# Patient Record
Sex: FEMALE | Race: OTHER | HISPANIC OR LATINO | URBAN - METROPOLITAN AREA
[De-identification: names, ages, dates, MRNs, and addresses within clinical notes are randomized per-mention and may not be internally consistent; named-entity substitution may affect disease eponyms.]

---

## 2017-11-02 ENCOUNTER — OUTPATIENT (OUTPATIENT)
Dept: OUTPATIENT SERVICES | Facility: HOSPITAL | Age: 42
LOS: 1 days | Discharge: HOME | End: 2017-11-02
Payer: COMMERCIAL

## 2017-11-02 DIAGNOSIS — C50.919 MALIGNANT NEOPLASM OF UNSPECIFIED SITE OF UNSPECIFIED FEMALE BREAST: ICD-10-CM

## 2017-11-02 DIAGNOSIS — C50.911 MALIGNANT NEOPLASM OF UNSPECIFIED SITE OF RIGHT FEMALE BREAST: ICD-10-CM

## 2017-11-03 ENCOUNTER — APPOINTMENT (OUTPATIENT)
Dept: HEMATOLOGY ONCOLOGY | Facility: CLINIC | Age: 42
End: 2017-11-03
Payer: COMMERCIAL

## 2017-11-03 VITALS
DIASTOLIC BLOOD PRESSURE: 83 MMHG | HEART RATE: 100 BPM | TEMPERATURE: 97.9 F | SYSTOLIC BLOOD PRESSURE: 135 MMHG | RESPIRATION RATE: 16 BRPM | OXYGEN SATURATION: 99 % | HEIGHT: 61.81 IN | WEIGHT: 172.18 LBS | BODY MASS INDEX: 31.68 KG/M2

## 2017-11-03 DIAGNOSIS — Z80.1 FAMILY HISTORY OF MALIGNANT NEOPLASM OF TRACHEA, BRONCHUS AND LUNG: ICD-10-CM

## 2017-11-03 DIAGNOSIS — Z78.9 OTHER SPECIFIED HEALTH STATUS: ICD-10-CM

## 2017-11-03 PROCEDURE — 99245 OFF/OP CONSLTJ NEW/EST HI 55: CPT

## 2017-11-08 ENCOUNTER — RESULT REVIEW (OUTPATIENT)
Age: 42
End: 2017-11-08

## 2017-11-08 PROCEDURE — 88321 CONSLTJ&REPRT SLD PREP ELSWR: CPT

## 2017-11-09 LAB — SURGICAL PATHOLOGY STUDY: SIGNIFICANT CHANGE UP

## 2017-11-13 ENCOUNTER — RESULT REVIEW (OUTPATIENT)
Age: 42
End: 2017-11-13

## 2017-11-13 PROCEDURE — 88321 CONSLTJ&REPRT SLD PREP ELSWR: CPT

## 2017-11-15 LAB — SURGICAL PATHOLOGY STUDY: SIGNIFICANT CHANGE UP

## 2018-01-10 ENCOUNTER — TRANSCRIPTION ENCOUNTER (OUTPATIENT)
Age: 43
End: 2018-01-10

## 2018-01-29 PROBLEM — Z78.9 SOCIAL ALCOHOL USE: Status: ACTIVE | Noted: 2018-01-29

## 2018-01-29 PROBLEM — Z80.1 FAMILY HISTORY OF LUNG CANCER: Status: ACTIVE | Noted: 2018-01-29

## 2018-03-01 ENCOUNTER — OTHER (OUTPATIENT)
Age: 43
End: 2018-03-01

## 2018-03-06 ENCOUNTER — OUTPATIENT (OUTPATIENT)
Dept: OUTPATIENT SERVICES | Facility: HOSPITAL | Age: 43
LOS: 1 days | Discharge: HOME | End: 2018-03-06

## 2018-03-06 DIAGNOSIS — C50.919 MALIGNANT NEOPLASM OF UNSPECIFIED SITE OF UNSPECIFIED FEMALE BREAST: ICD-10-CM

## 2018-03-09 ENCOUNTER — APPOINTMENT (OUTPATIENT)
Dept: HEMATOLOGY ONCOLOGY | Facility: CLINIC | Age: 43
End: 2018-03-09
Payer: COMMERCIAL

## 2018-03-09 VITALS
TEMPERATURE: 98 F | HEART RATE: 81 BPM | BODY MASS INDEX: 31.24 KG/M2 | WEIGHT: 169.75 LBS | RESPIRATION RATE: 16 BRPM | OXYGEN SATURATION: 100 % | DIASTOLIC BLOOD PRESSURE: 83 MMHG | SYSTOLIC BLOOD PRESSURE: 135 MMHG

## 2018-03-09 PROCEDURE — 99214 OFFICE O/P EST MOD 30 MIN: CPT

## 2018-03-12 ENCOUNTER — APPOINTMENT (OUTPATIENT)
Dept: BREAST CENTER | Facility: CLINIC | Age: 43
End: 2018-03-12
Payer: COMMERCIAL

## 2018-03-12 VITALS
WEIGHT: 167 LBS | BODY MASS INDEX: 30.73 KG/M2 | HEIGHT: 61.81 IN | SYSTOLIC BLOOD PRESSURE: 121 MMHG | HEART RATE: 82 BPM | TEMPERATURE: 98.2 F | OXYGEN SATURATION: 100 % | DIASTOLIC BLOOD PRESSURE: 79 MMHG

## 2018-03-12 PROCEDURE — 99243 OFF/OP CNSLTJ NEW/EST LOW 30: CPT

## 2018-06-18 ENCOUNTER — RESULT REVIEW (OUTPATIENT)
Age: 43
End: 2018-06-18

## 2018-06-28 ENCOUNTER — APPOINTMENT (OUTPATIENT)
Dept: BREAST CENTER | Facility: CLINIC | Age: 43
End: 2018-06-28
Payer: COMMERCIAL

## 2018-06-28 PROCEDURE — 99214 OFFICE O/P EST MOD 30 MIN: CPT

## 2018-07-13 ENCOUNTER — OUTPATIENT (OUTPATIENT)
Dept: OUTPATIENT SERVICES | Facility: HOSPITAL | Age: 43
LOS: 1 days | Discharge: HOME | End: 2018-07-13

## 2018-07-13 DIAGNOSIS — C50.919 MALIGNANT NEOPLASM OF UNSPECIFIED SITE OF UNSPECIFIED FEMALE BREAST: ICD-10-CM

## 2018-07-17 ENCOUNTER — LABORATORY RESULT (OUTPATIENT)
Age: 43
End: 2018-07-17

## 2018-07-17 ENCOUNTER — APPOINTMENT (OUTPATIENT)
Dept: HEMATOLOGY ONCOLOGY | Facility: CLINIC | Age: 43
End: 2018-07-17
Payer: COMMERCIAL

## 2018-07-17 ENCOUNTER — RESULT REVIEW (OUTPATIENT)
Age: 43
End: 2018-07-17

## 2018-07-17 VITALS
OXYGEN SATURATION: 100 % | DIASTOLIC BLOOD PRESSURE: 75 MMHG | WEIGHT: 159.99 LBS | TEMPERATURE: 98.9 F | BODY MASS INDEX: 29.44 KG/M2 | HEART RATE: 90 BPM | SYSTOLIC BLOOD PRESSURE: 116 MMHG | RESPIRATION RATE: 16 BRPM

## 2018-07-17 LAB
BASOPHILS # BLD AUTO: 0.1 K/UL — SIGNIFICANT CHANGE UP (ref 0–0.2)
BASOPHILS NFR BLD AUTO: 1.6 % — SIGNIFICANT CHANGE UP (ref 0–2)
EOSINOPHIL # BLD AUTO: 0 K/UL — SIGNIFICANT CHANGE UP (ref 0–0.5)
EOSINOPHIL NFR BLD AUTO: 0.9 % — SIGNIFICANT CHANGE UP (ref 0–6)
HCT VFR BLD CALC: 32.7 % — LOW (ref 34.5–45)
HGB BLD-MCNC: 11.2 G/DL — LOW (ref 11.5–15.5)
LYMPHOCYTES # BLD AUTO: 0.7 K/UL — LOW (ref 1–3.3)
LYMPHOCYTES # BLD AUTO: 18.6 % — SIGNIFICANT CHANGE UP (ref 13–44)
MCHC RBC-ENTMCNC: 33.5 PG — SIGNIFICANT CHANGE UP (ref 27–34)
MCHC RBC-ENTMCNC: 34.4 G/DL — SIGNIFICANT CHANGE UP (ref 32–36)
MCV RBC AUTO: 97.6 FL — SIGNIFICANT CHANGE UP (ref 80–100)
MONOCYTES # BLD AUTO: 0.5 K/UL — SIGNIFICANT CHANGE UP (ref 0–0.9)
MONOCYTES NFR BLD AUTO: 12.3 % — SIGNIFICANT CHANGE UP (ref 2–14)
NEUTROPHILS # BLD AUTO: 2.5 K/UL — SIGNIFICANT CHANGE UP (ref 1.8–7.4)
NEUTROPHILS NFR BLD AUTO: 66.6 % — SIGNIFICANT CHANGE UP (ref 43–77)
PLATELET # BLD AUTO: 176 K/UL — SIGNIFICANT CHANGE UP (ref 150–400)
RBC # BLD: 3.35 M/UL — LOW (ref 3.8–5.2)
RBC # FLD: 10.8 % — SIGNIFICANT CHANGE UP (ref 10.3–14.5)
WBC # BLD: 3.8 K/UL — SIGNIFICANT CHANGE UP (ref 3.8–10.5)
WBC # FLD AUTO: 3.8 K/UL — SIGNIFICANT CHANGE UP (ref 3.8–10.5)

## 2018-07-17 PROCEDURE — 99215 OFFICE O/P EST HI 40 MIN: CPT

## 2018-07-17 RX ORDER — MECLIZINE HYDROCHLORIDE 12.5 MG/1
12.5 TABLET ORAL
Qty: 30 | Refills: 0 | Status: DISCONTINUED | COMMUNITY
Start: 2018-06-26

## 2018-07-17 RX ORDER — SILVER SULFADIAZINE 10 MG/G
1 CREAM TOPICAL
Qty: 400 | Refills: 0 | Status: DISCONTINUED | COMMUNITY
Start: 2018-01-29

## 2018-07-17 RX ORDER — GABAPENTIN 300 MG/1
300 CAPSULE ORAL
Qty: 30 | Refills: 0 | Status: DISCONTINUED | COMMUNITY
Start: 2018-02-13

## 2018-07-17 RX ORDER — CYCLOBENZAPRINE HYDROCHLORIDE 5 MG/1
5 TABLET, FILM COATED ORAL
Qty: 90 | Refills: 0 | Status: DISCONTINUED | COMMUNITY
Start: 2018-03-08

## 2018-07-18 LAB
ANION GAP SERPL CALC-SCNC: 11 MMOL/L
APPEARANCE: ABNORMAL
BILIRUBIN URINE: NEGATIVE
BLOOD URINE: NEGATIVE
BUN SERPL-MCNC: 13 MG/DL
CALCIUM SERPL-MCNC: 8.5 MG/DL
CHLORIDE SERPL-SCNC: 104 MMOL/L
CO2 SERPL-SCNC: 26 MMOL/L
COLOR: YELLOW
CREAT SERPL-MCNC: 0.65 MG/DL
GLUCOSE QUALITATIVE U: NEGATIVE MG/DL
GLUCOSE SERPL-MCNC: 98 MG/DL
KETONES URINE: NEGATIVE
LEUKOCYTE ESTERASE URINE: NEGATIVE
NITRITE URINE: NEGATIVE
PH URINE: 7.5
POTASSIUM SERPL-SCNC: 4.1 MMOL/L
PROTEIN URINE: NEGATIVE MG/DL
SODIUM SERPL-SCNC: 141 MMOL/L
SPECIFIC GRAVITY URINE: 1.02
UROBILINOGEN URINE: NEGATIVE MG/DL

## 2018-07-19 ENCOUNTER — APPOINTMENT (OUTPATIENT)
Dept: BREAST CENTER | Facility: CLINIC | Age: 43
End: 2018-07-19

## 2018-10-04 ENCOUNTER — TRANSCRIPTION ENCOUNTER (OUTPATIENT)
Age: 43
End: 2018-10-04

## 2018-11-05 ENCOUNTER — APPOINTMENT (OUTPATIENT)
Dept: BREAST CENTER | Facility: CLINIC | Age: 43
End: 2018-11-05
Payer: COMMERCIAL

## 2018-11-05 ENCOUNTER — OTHER (OUTPATIENT)
Age: 43
End: 2018-11-05

## 2018-11-05 VITALS
BODY MASS INDEX: 29.44 KG/M2 | HEIGHT: 62 IN | SYSTOLIC BLOOD PRESSURE: 135 MMHG | WEIGHT: 160 LBS | HEART RATE: 90 BPM | DIASTOLIC BLOOD PRESSURE: 84 MMHG

## 2018-11-05 VITALS
HEART RATE: 90 BPM | SYSTOLIC BLOOD PRESSURE: 135 MMHG | WEIGHT: 160 LBS | BODY MASS INDEX: 29.44 KG/M2 | HEIGHT: 62 IN | DIASTOLIC BLOOD PRESSURE: 84 MMHG

## 2018-11-05 PROCEDURE — 99214 OFFICE O/P EST MOD 30 MIN: CPT

## 2018-11-05 RX ORDER — POLYETHYLENE GLYCOL 3350 17 G/17G
17 POWDER, FOR SOLUTION ORAL
Qty: 30 | Refills: 0 | Status: COMPLETED | COMMUNITY
Start: 2018-08-10

## 2018-11-05 RX ORDER — SODIUM SULFATE, POTASSIUM SULFATE, MAGNESIUM SULFATE 17.5; 3.13; 1.6 G/ML; G/ML; G/ML
17.5-3.13-1.6 SOLUTION, CONCENTRATE ORAL
Qty: 354 | Refills: 0 | Status: COMPLETED | COMMUNITY
Start: 2018-08-10

## 2018-11-05 RX ORDER — SULFAMETHOXAZOLE AND TRIMETHOPRIM 800; 160 MG/1; MG/1
800-160 TABLET ORAL TWICE DAILY
Qty: 10 | Refills: 0 | Status: COMPLETED | COMMUNITY
Start: 2018-07-17 | End: 2018-11-05

## 2018-11-15 ENCOUNTER — OUTPATIENT (OUTPATIENT)
Dept: OUTPATIENT SERVICES | Facility: HOSPITAL | Age: 43
LOS: 1 days | Discharge: HOME | End: 2018-11-15

## 2018-11-15 DIAGNOSIS — C50.919 MALIGNANT NEOPLASM OF UNSPECIFIED SITE OF UNSPECIFIED FEMALE BREAST: ICD-10-CM

## 2018-11-22 ENCOUNTER — MOBILE ON CALL (OUTPATIENT)
Age: 43
End: 2018-11-22

## 2018-11-22 ENCOUNTER — TRANSCRIPTION ENCOUNTER (OUTPATIENT)
Age: 43
End: 2018-11-22

## 2018-11-27 ENCOUNTER — APPOINTMENT (OUTPATIENT)
Dept: HEMATOLOGY ONCOLOGY | Facility: CLINIC | Age: 43
End: 2018-11-27
Payer: COMMERCIAL

## 2018-11-27 VITALS
TEMPERATURE: 98.4 F | SYSTOLIC BLOOD PRESSURE: 124 MMHG | WEIGHT: 159.84 LBS | HEART RATE: 84 BPM | DIASTOLIC BLOOD PRESSURE: 80 MMHG | RESPIRATION RATE: 17 BRPM | BODY MASS INDEX: 29.23 KG/M2 | OXYGEN SATURATION: 100 %

## 2018-11-27 PROCEDURE — 99215 OFFICE O/P EST HI 40 MIN: CPT

## 2018-12-03 ENCOUNTER — OTHER (OUTPATIENT)
Age: 43
End: 2018-12-03

## 2018-12-04 ENCOUNTER — FORM ENCOUNTER (OUTPATIENT)
Age: 43
End: 2018-12-04

## 2018-12-05 ENCOUNTER — APPOINTMENT (OUTPATIENT)
Dept: UROLOGY | Facility: CLINIC | Age: 43
End: 2018-12-05
Payer: COMMERCIAL

## 2018-12-05 ENCOUNTER — APPOINTMENT (OUTPATIENT)
Dept: CT IMAGING | Facility: IMAGING CENTER | Age: 43
End: 2018-12-05
Payer: COMMERCIAL

## 2018-12-05 ENCOUNTER — OUTPATIENT (OUTPATIENT)
Dept: OUTPATIENT SERVICES | Facility: HOSPITAL | Age: 43
LOS: 1 days | End: 2018-12-05
Payer: COMMERCIAL

## 2018-12-05 VITALS
HEIGHT: 62 IN | DIASTOLIC BLOOD PRESSURE: 83 MMHG | RESPIRATION RATE: 17 BRPM | TEMPERATURE: 98.7 F | SYSTOLIC BLOOD PRESSURE: 124 MMHG | BODY MASS INDEX: 29.26 KG/M2 | HEART RATE: 105 BPM | WEIGHT: 159 LBS

## 2018-12-05 DIAGNOSIS — N28.89 OTHER SPECIFIED DISORDERS OF KIDNEY AND URETER: ICD-10-CM

## 2018-12-05 DIAGNOSIS — D05.12 INTRADUCTAL CARCINOMA IN SITU OF LEFT BREAST: ICD-10-CM

## 2018-12-05 PROCEDURE — 99203 OFFICE O/P NEW LOW 30 MIN: CPT

## 2018-12-05 PROCEDURE — 74178 CT ABD&PLV WO CNTR FLWD CNTR: CPT

## 2018-12-05 PROCEDURE — 74178 CT ABD&PLV WO CNTR FLWD CNTR: CPT | Mod: 26

## 2018-12-06 ENCOUNTER — APPOINTMENT (OUTPATIENT)
Dept: UROLOGY | Facility: CLINIC | Age: 43
End: 2018-12-06

## 2018-12-10 ENCOUNTER — APPOINTMENT (OUTPATIENT)
Dept: UROLOGY | Facility: CLINIC | Age: 43
End: 2018-12-10

## 2019-01-29 NOTE — HISTORY OF PRESENT ILLNESS
[Disease: _____________________] : Disease: [unfilled] [T: ___] : T[unfilled] [N: ___] : N[unfilled] [M: ___] : M[unfilled] [AJCC Stage: ____] : AJCC Stage: [unfilled] [de-identified] : Ms. Flowers is a [place] female who on June 2, 2017 saw her gynecologist, Dr. Corrigan, as she had menses twice in the same month and subsequently felt tired.  She was then referred for routine screening mammogram, which was performed on June 5, 2017 with the finding of a right breast mass with associated architectural distortion suspicious for malignancy with ultrasound-guided core biopsy recommended.  The patient also consequently had a bilateral breast ultrasound on the same date with a finding of a hypoechoic mass with irregular margins and posterior shadowing at the 1 o'clock axis of the right breast corresponding to the findings on the mammogram with no further suspicious findings; ultrasound-guided core biopsy was recommended.  She ultimately had an ultrasound-guided core biopsy performed on June 12, 2017 with a finding of invasive moderately differentiated duct carcinoma, with the largest focus of invasive carcinoma measuring up to 5 mm with evidence of DCIS, intermediate nuclear grade, solid and cribriform types, with no evidence of lymphovascular invasion, ER positive (77%), MO positive (83%), and HER-2/carmen (1+) and negative.  The patient then went on to have a bilateral breast MRI with the right breast showing, within the upper inner quadrant, a spiculated enhancing mass corresponding to the biopsy-proven cancer measuring up to 2 cm in the mediolateral direction, and 1.7 cm in the anterior-posterior direction, and 1.5 cm in the craniocaudal direction; left breast showed, within the upper outer quadrant, a somewhat serpiginous area of enhancement with the more anterior aspect becoming more nodular and measuring up to 1.1 cm in size, with additional scattered nonspecific enhancing foci; the axillae were unremarkable.  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  The patient subsequently was seen with complaint of pain and burning sensation and a new "lump" at the left breast biopsy site with a comment from the radiologist of a small hematoma with surrounding ecchymosis present on July 22, 2017, with instructions for warm compresses and pressure.  The patient ultimately saw Dr. Caroline Benítez and underwent a bilateral lumpectomy and reduction mammoplasty, which was performed on August 11, 2017, at [facility] with a finding in the right breast of an invasive ductal carcinoma, moderately differentiated, measuring 2 cm in greatest diameter, nuclear, [place] score 7/9, with evidence of DCIS and LCIS present in addition to fibrocystic changes with margins negative for carcinoma, with 0/3 sentinel lymph nodes involved with carcinoma; the left breast excisional biopsy showed evidence of ductal carcinoma in situ with microinvasion, with DCIS being of high nuclear grade, solid and comedo type with central necrosis, evidence of lobular carcinoma in situ, with margins of resection negative for DCIS and microinvasion.  The patient returned to the operating room on October 13, 2017 for a left sentinel lymph node biopsy with a finding of 0/3 left sentinel lymph nodes involved with carcinoma.  She subsequently had Oncotype DX testing sent off on her right breast carcinoma with a finding of a recurrence score of 7, correlating to an 8% risk of distant recurrence within 10 years should she take tamoxifen alone.  The patient saw a medical oncologist, Dr. Maldonado, at [facility] who recommended adjuvant chemotherapy with CMF.  She also had BRCA testing sent off, specifically a BRCA 1/2 [employer] 3-site mutation panel, which returned negative of note.\par \par She saw me in initial consultation in 11/17 and I recommended that she proceed with further genetic predisposition testing which returned negative for other known genetic predisposition mutations. Started Tamoxifen on October 24th 2017. She completed RT at [place] () on 2/13/18.   [de-identified] : right breast IDC; left breast DCIS [de-identified] : Pt c/o gradually worsening dysmenorrhea since beginning slater; menses also heavier (always have been heavy). \par \par LMP approx 11/2 . \par Since the period was over she persisted with cramping pelvic pain and then developed left flank pain 3 days ago; went to [facility] in [facility]. "urine test was negative " per pt; she stopped the abx she was given. In the interim the pain has decreased but not fully gone.\par \par She otherwise notes agood appetite stable weight and excellent performance status \par \par \par \par \par Last visit she c/o "brain fog" - more forgetful ie names or numbers, but this has improved in the interim. \par \par She continue to have regular menses but in the last 2 mo she had heavy and prolonged menstrual bleeding lasting up to 10-12 days. She spoke with gynecologist and sent for a transvaginal US which returned WNL.   She has an appt to see her gyn next week. She also c/o fever / chills and sore throat over the last 3 days (fever resolved- last was yesterday). She also c/o pelvic "cramping"; on further questioning she noted some mild and intermittent dysuria over the past several weeks and she denies all other complaints.\par \par 6/18 B/L breast mammo and US : neg  \par \par

## 2019-01-29 NOTE — REVIEW OF SYSTEMS
[Negative] : Endocrine [FreeTextEntry2] : as above [FreeTextEntry4] : as above [FreeTextEntry8] : as above [de-identified] : as above

## 2019-01-29 NOTE — PHYSICAL EXAM
[Fully active, able to carry on all pre-disease performance without restriction] : Status 0 - Fully active, able to carry on all pre-disease performance without restriction [Normal] : affect appropriate [de-identified] : s/p B/L LE  and redn mammoplasty with well healed scars, no masses; B/L ax neg [de-identified] : question of mild right sided CVAT???

## 2019-02-14 ENCOUNTER — APPOINTMENT (OUTPATIENT)
Dept: BREAST CENTER | Facility: CLINIC | Age: 44
End: 2019-02-14
Payer: COMMERCIAL

## 2019-02-14 VITALS
HEIGHT: 62 IN | BODY MASS INDEX: 29.44 KG/M2 | HEART RATE: 91 BPM | SYSTOLIC BLOOD PRESSURE: 112 MMHG | DIASTOLIC BLOOD PRESSURE: 71 MMHG | TEMPERATURE: 98.6 F | WEIGHT: 160 LBS

## 2019-02-14 PROCEDURE — 99214 OFFICE O/P EST MOD 30 MIN: CPT

## 2019-02-22 ENCOUNTER — OUTPATIENT (OUTPATIENT)
Dept: OUTPATIENT SERVICES | Facility: HOSPITAL | Age: 44
LOS: 1 days | Discharge: HOME | End: 2019-02-22

## 2019-02-22 DIAGNOSIS — C50.919 MALIGNANT NEOPLASM OF UNSPECIFIED SITE OF UNSPECIFIED FEMALE BREAST: ICD-10-CM

## 2019-03-05 ENCOUNTER — RESULT REVIEW (OUTPATIENT)
Age: 44
End: 2019-03-05

## 2019-03-05 ENCOUNTER — APPOINTMENT (OUTPATIENT)
Dept: HEMATOLOGY ONCOLOGY | Facility: CLINIC | Age: 44
End: 2019-03-05
Payer: COMMERCIAL

## 2019-03-05 VITALS
WEIGHT: 159.99 LBS | RESPIRATION RATE: 16 BRPM | OXYGEN SATURATION: 100 % | DIASTOLIC BLOOD PRESSURE: 80 MMHG | HEART RATE: 93 BPM | TEMPERATURE: 98.5 F | BODY MASS INDEX: 29.26 KG/M2 | SYSTOLIC BLOOD PRESSURE: 117 MMHG

## 2019-03-05 LAB
BASOPHILS # BLD AUTO: 0 K/UL — SIGNIFICANT CHANGE UP (ref 0–0.2)
BASOPHILS NFR BLD AUTO: 0.3 % — SIGNIFICANT CHANGE UP (ref 0–2)
EOSINOPHIL # BLD AUTO: 0.1 K/UL — SIGNIFICANT CHANGE UP (ref 0–0.5)
EOSINOPHIL NFR BLD AUTO: 1.1 % — SIGNIFICANT CHANGE UP (ref 0–6)
HCT VFR BLD CALC: 33.6 % — LOW (ref 34.5–45)
HGB BLD-MCNC: 11.7 G/DL — SIGNIFICANT CHANGE UP (ref 11.5–15.5)
LYMPHOCYTES # BLD AUTO: 1.2 K/UL — SIGNIFICANT CHANGE UP (ref 1–3.3)
LYMPHOCYTES # BLD AUTO: 16.6 % — SIGNIFICANT CHANGE UP (ref 13–44)
MCHC RBC-ENTMCNC: 33.2 PG — SIGNIFICANT CHANGE UP (ref 27–34)
MCHC RBC-ENTMCNC: 34.7 G/DL — SIGNIFICANT CHANGE UP (ref 32–36)
MCV RBC AUTO: 95.8 FL — SIGNIFICANT CHANGE UP (ref 80–100)
MONOCYTES # BLD AUTO: 0.4 K/UL — SIGNIFICANT CHANGE UP (ref 0–0.9)
MONOCYTES NFR BLD AUTO: 5.2 % — SIGNIFICANT CHANGE UP (ref 2–14)
NEUTROPHILS # BLD AUTO: 5.5 K/UL — SIGNIFICANT CHANGE UP (ref 1.8–7.4)
NEUTROPHILS NFR BLD AUTO: 76.8 % — SIGNIFICANT CHANGE UP (ref 43–77)
PLATELET # BLD AUTO: 223 K/UL — SIGNIFICANT CHANGE UP (ref 150–400)
RBC # BLD: 3.51 M/UL — LOW (ref 3.8–5.2)
RBC # FLD: 11.3 % — SIGNIFICANT CHANGE UP (ref 10.3–14.5)
WBC # BLD: 7.2 K/UL — SIGNIFICANT CHANGE UP (ref 3.8–10.5)
WBC # FLD AUTO: 7.2 K/UL — SIGNIFICANT CHANGE UP (ref 3.8–10.5)

## 2019-03-05 PROCEDURE — 99214 OFFICE O/P EST MOD 30 MIN: CPT

## 2019-03-07 RX ORDER — CEPHALEXIN 500 MG/1
500 CAPSULE ORAL
Qty: 21 | Refills: 0 | Status: DISCONTINUED | COMMUNITY
Start: 2018-11-22

## 2019-03-07 NOTE — PHYSICAL EXAM
[Fully active, able to carry on all pre-disease performance without restriction] : Status 0 - Fully active, able to carry on all pre-disease performance without restriction [Normal] : affect appropriate [de-identified] : s/p B/L LE  and redn mammoplasty with well healed scars, no masses; B/L ax neg [de-identified] : question of mild right sided CVAT???

## 2019-03-07 NOTE — HISTORY OF PRESENT ILLNESS
[Disease: _____________________] : Disease: [unfilled] [T: ___] : T[unfilled] [N: ___] : N[unfilled] [M: ___] : M[unfilled] [AJCC Stage: ____] : AJCC Stage: [unfilled] [de-identified] : Ms. Flowers is a [place] female who on June 2, 2017 saw her gynecologist, Dr. Corrigan, as she had menses twice in the same month and subsequently felt tired.  She was then referred for routine screening mammogram, which was performed on June 5, 2017 with the finding of a right breast mass with associated architectural distortion suspicious for malignancy with ultrasound-guided core biopsy recommended.  The patient also consequently had a bilateral breast ultrasound on the same date with a finding of a hypoechoic mass with irregular margins and posterior shadowing at the 1 o'clock axis of the right breast corresponding to the findings on the mammogram with no further suspicious findings; ultrasound-guided core biopsy was recommended.  She ultimately had an ultrasound-guided core biopsy performed on June 12, 2017 with a finding of invasive moderately differentiated duct carcinoma, with the largest focus of invasive carcinoma measuring up to 5 mm with evidence of DCIS, intermediate nuclear grade, solid and cribriform types, with no evidence of lymphovascular invasion, ER positive (77%), GA positive (83%), and HER-2/carmen (1+) and negative.  The patient then went on to have a bilateral breast MRI with the right breast showing, within the upper inner quadrant, a spiculated enhancing mass corresponding to the biopsy-proven cancer measuring up to 2 cm in the mediolateral direction, and 1.7 cm in the anterior-posterior direction, and 1.5 cm in the craniocaudal direction; left breast showed, within the upper outer quadrant, a somewhat serpiginous area of enhancement with the more anterior aspect becoming more nodular and measuring up to 1.1 cm in size, with additional scattered nonspecific enhancing foci; the axillae were unremarkable.  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  The patient subsequently was seen with complaint of pain and burning sensation and a new "lump" at the left breast biopsy site with a comment from the radiologist of a small hematoma with surrounding ecchymosis present on July 22, 2017, with instructions for warm compresses and pressure.  The patient ultimately saw Dr. Caroline Benítez and underwent a bilateral lumpectomy and reduction mammoplasty, which was performed on August 11, 2017, at [facility] with a finding in the right breast of an invasive ductal carcinoma, moderately differentiated, measuring 2 cm in greatest diameter, nuclear, [place] score 7/9, with evidence of DCIS and LCIS present in addition to fibrocystic changes with margins negative for carcinoma, with 0/3 sentinel lymph nodes involved with carcinoma; the left breast excisional biopsy showed evidence of ductal carcinoma in situ with microinvasion, with DCIS being of high nuclear grade, solid and comedo type with central necrosis, evidence of lobular carcinoma in situ, with margins of resection negative for DCIS and microinvasion.  The patient returned to the operating room on October 13, 2017 for a left sentinel lymph node biopsy with a finding of 0/3 left sentinel lymph nodes involved with carcinoma.  She subsequently had Oncotype DX testing sent off on her right breast carcinoma with a finding of a recurrence score of 7, correlating to an 8% risk of distant recurrence within 10 years should she take tamoxifen alone.  The patient saw a medical oncologist, Dr. Maldonado, at [facility] who recommended adjuvant chemotherapy with CMF.  She also had BRCA testing sent off, specifically a BRCA 1/2 [employer] 3-site mutation panel, which returned negative of note.\par \par She saw me in initial consultation in 11/17 and I recommended that she proceed with further genetic predisposition testing which returned negative for other known genetic predisposition mutations. Started Tamoxifen on October 24th 2017. She completed RT at [place] () on 2/13/18.   [de-identified] : right breast IDC; left breast DCIS [de-identified] : Here for routine f/u. \par \par Menses remain regular. No further severe dysmenorrhea / cramping beyond her baseline.\par   \par She otherwise notes a good appetite stable weight and excellent performance status \par \par 6/18 B/L mammo neg\par \par

## 2019-03-26 ENCOUNTER — TRANSCRIPTION ENCOUNTER (OUTPATIENT)
Age: 44
End: 2019-03-26

## 2019-05-20 ENCOUNTER — FORM ENCOUNTER (OUTPATIENT)
Age: 44
End: 2019-05-20

## 2019-05-21 ENCOUNTER — APPOINTMENT (OUTPATIENT)
Dept: CT IMAGING | Facility: IMAGING CENTER | Age: 44
End: 2019-05-21
Payer: COMMERCIAL

## 2019-05-21 ENCOUNTER — LABORATORY RESULT (OUTPATIENT)
Age: 44
End: 2019-05-21

## 2019-05-21 ENCOUNTER — APPOINTMENT (OUTPATIENT)
Dept: HEMATOLOGY ONCOLOGY | Facility: CLINIC | Age: 44
End: 2019-05-21
Payer: COMMERCIAL

## 2019-05-21 ENCOUNTER — APPOINTMENT (OUTPATIENT)
Dept: MRI IMAGING | Facility: IMAGING CENTER | Age: 44
End: 2019-05-21
Payer: COMMERCIAL

## 2019-05-21 ENCOUNTER — OUTPATIENT (OUTPATIENT)
Dept: OUTPATIENT SERVICES | Facility: HOSPITAL | Age: 44
LOS: 1 days | End: 2019-05-21
Payer: COMMERCIAL

## 2019-05-21 ENCOUNTER — RESULT REVIEW (OUTPATIENT)
Age: 44
End: 2019-05-21

## 2019-05-21 ENCOUNTER — OUTPATIENT (OUTPATIENT)
Dept: OUTPATIENT SERVICES | Facility: HOSPITAL | Age: 44
LOS: 1 days | Discharge: HOME | End: 2019-05-21

## 2019-05-21 VITALS
OXYGEN SATURATION: 100 % | DIASTOLIC BLOOD PRESSURE: 79 MMHG | WEIGHT: 175.26 LBS | HEART RATE: 81 BPM | RESPIRATION RATE: 16 BRPM | TEMPERATURE: 97.4 F | SYSTOLIC BLOOD PRESSURE: 117 MMHG | BODY MASS INDEX: 32.06 KG/M2

## 2019-05-21 DIAGNOSIS — C50.919 MALIGNANT NEOPLASM OF UNSPECIFIED SITE OF UNSPECIFIED FEMALE BREAST: ICD-10-CM

## 2019-05-21 DIAGNOSIS — R63.5 ABNORMAL WEIGHT GAIN: ICD-10-CM

## 2019-05-21 DIAGNOSIS — C50.911 MALIGNANT NEOPLASM OF UNSPECIFIED SITE OF RIGHT FEMALE BREAST: ICD-10-CM

## 2019-05-21 DIAGNOSIS — Z17.0 ESTROGEN RECEPTOR POSITIVE STATUS [ER+]: ICD-10-CM

## 2019-05-21 LAB
ALBUMIN SERPL ELPH-MCNC: 4.3 G/DL
ALP BLD-CCNC: 55 U/L
ALT SERPL-CCNC: 15 U/L
ANION GAP SERPL CALC-SCNC: 13 MMOL/L
AST SERPL-CCNC: 17 U/L
BASOPHILS # BLD AUTO: 0 K/UL — SIGNIFICANT CHANGE UP (ref 0–0.2)
BASOPHILS NFR BLD AUTO: 0.3 % — SIGNIFICANT CHANGE UP (ref 0–2)
BILIRUB SERPL-MCNC: 0.2 MG/DL
BUN SERPL-MCNC: 12 MG/DL — SIGNIFICANT CHANGE UP (ref 7–23)
BUN SERPL-MCNC: 13 MG/DL
CA-I BLDA-SCNC: 1.16 MMOL/L — SIGNIFICANT CHANGE UP (ref 1.12–1.3)
CALCIUM SERPL-MCNC: 9.1 MG/DL
CHLORIDE SERPL-SCNC: 104 MMOL/L — SIGNIFICANT CHANGE UP (ref 96–108)
CHLORIDE SERPL-SCNC: 105 MMOL/L
CO2 SERPL-SCNC: 25 MMOL/L
CO2 SERPL-SCNC: 27 MMOL/L — SIGNIFICANT CHANGE UP (ref 22–31)
CREAT SERPL-MCNC: 0.69 MG/DL
CREAT SERPL-MCNC: 0.7 MG/DL — SIGNIFICANT CHANGE UP (ref 0.5–1.3)
EOSINOPHIL # BLD AUTO: 0.1 K/UL — SIGNIFICANT CHANGE UP (ref 0–0.5)
EOSINOPHIL NFR BLD AUTO: 1.4 % — SIGNIFICANT CHANGE UP (ref 0–6)
ERYTHROCYTE [SEDIMENTATION RATE] IN BLOOD: 25 MM/HR — HIGH (ref 0–15)
GLUCOSE SERPL-MCNC: 111 MG/DL — HIGH (ref 70–99)
GLUCOSE SERPL-MCNC: 117 MG/DL
HCT VFR BLD CALC: 33.1 % — LOW (ref 34.5–45)
HGB BLD-MCNC: 11.6 G/DL — SIGNIFICANT CHANGE UP (ref 11.5–15.5)
LYMPHOCYTES # BLD AUTO: 1.2 K/UL — SIGNIFICANT CHANGE UP (ref 1–3.3)
LYMPHOCYTES # BLD AUTO: 28.1 % — SIGNIFICANT CHANGE UP (ref 13–44)
MCHC RBC-ENTMCNC: 34.5 PG — HIGH (ref 27–34)
MCHC RBC-ENTMCNC: 35.1 G/DL — SIGNIFICANT CHANGE UP (ref 32–36)
MCV RBC AUTO: 98.1 FL — SIGNIFICANT CHANGE UP (ref 80–100)
MONOCYTES # BLD AUTO: 0.4 K/UL — SIGNIFICANT CHANGE UP (ref 0–0.9)
MONOCYTES NFR BLD AUTO: 8.8 % — SIGNIFICANT CHANGE UP (ref 2–14)
NEUTROPHILS # BLD AUTO: 2.6 K/UL — SIGNIFICANT CHANGE UP (ref 1.8–7.4)
NEUTROPHILS NFR BLD AUTO: 61.4 % — SIGNIFICANT CHANGE UP (ref 43–77)
PLATELET # BLD AUTO: 247 K/UL — SIGNIFICANT CHANGE UP (ref 150–400)
POTASSIUM SERPL-MCNC: 4.3 MMOL/L — SIGNIFICANT CHANGE UP (ref 3.5–5.3)
POTASSIUM SERPL-SCNC: 4.3 MMOL/L — SIGNIFICANT CHANGE UP (ref 3.5–5.3)
POTASSIUM SERPL-SCNC: 4.7 MMOL/L
PROT SERPL-MCNC: 7.2 G/DL
RBC # BLD: 3.37 M/UL — LOW (ref 3.8–5.2)
RBC # FLD: 10.8 % — SIGNIFICANT CHANGE UP (ref 10.3–14.5)
SODIUM SERPL-SCNC: 141 MMOL/L — SIGNIFICANT CHANGE UP (ref 135–145)
SODIUM SERPL-SCNC: 143 MMOL/L
TSH SERPL-ACNC: 4.2 UIU/ML
WBC # BLD: 4.3 K/UL — SIGNIFICANT CHANGE UP (ref 3.8–10.5)
WBC # FLD AUTO: 4.3 K/UL — SIGNIFICANT CHANGE UP (ref 3.8–10.5)

## 2019-05-21 PROCEDURE — 74177 CT ABD & PELVIS W/CONTRAST: CPT | Mod: 26

## 2019-05-21 PROCEDURE — 70553 MRI BRAIN STEM W/O & W/DYE: CPT | Mod: 26

## 2019-05-21 PROCEDURE — 70553 MRI BRAIN STEM W/O & W/DYE: CPT

## 2019-05-21 PROCEDURE — 71260 CT THORAX DX C+: CPT | Mod: 26

## 2019-05-21 PROCEDURE — 74177 CT ABD & PELVIS W/CONTRAST: CPT

## 2019-05-21 PROCEDURE — A9585: CPT

## 2019-05-21 PROCEDURE — 99215 OFFICE O/P EST HI 40 MIN: CPT

## 2019-05-21 PROCEDURE — 71260 CT THORAX DX C+: CPT

## 2019-05-22 PROBLEM — R63.5 WEIGHT GAIN: Status: ACTIVE | Noted: 2019-05-22

## 2019-05-22 NOTE — REVIEW OF SYSTEMS
[Negative] : Endocrine [FreeTextEntry2] : as maycol [FreeTextEntry4] : as above [de-identified] : as above [de-identified] : as above

## 2019-05-22 NOTE — PHYSICAL EXAM
[Fully active, able to carry on all pre-disease performance without restriction] : Status 0 - Fully active, able to carry on all pre-disease performance without restriction [Normal] : affect appropriate [de-identified] : s/p B/L LE  and redn mammoplasty with well healed scars, no masses; B/L ax neg [de-identified] : Intact mini mental status exam; CN II-XII intact; no dysmetria; F-N intact; sensory motor intact; balance intact; muscles strength 5/5 B/L sym

## 2019-05-22 NOTE — HISTORY OF PRESENT ILLNESS
[Disease: _____________________] : Disease: [unfilled] [T: ___] : T[unfilled] [N: ___] : N[unfilled] [M: ___] : M[unfilled] [AJCC Stage: ____] : AJCC Stage: [unfilled] [de-identified] : Ms. Flowers is a Mong[place] female who on June 2, 2017 saw her gynecologist, Dr. Corrigan, as she had menses twice in the same month and subsequently felt tired.  She was then referred for routine screening mammogram, which was performed on June 5, 2017 with the finding of a right breast mass with associated architectural distortion suspicious for malignancy with ultrasound-guided core biopsy recommended.  The patient also consequently had a bilateral breast ultrasound on the same date with a finding of a hypoechoic mass with irregular margins and posterior shadowing at the 1 o'clock axis of the right breast corresponding to the findings on the mammogram with no further suspicious findings; ultrasound-guided core biopsy was recommended.  She ultimately had an ultrasound-guided core biopsy performed on June 12, 2017 with a finding of invasive moderately differentiated duct carcinoma, with the largest focus of invasive carcinoma measuring up to 5 mm with evidence of DCIS, intermediate nuclear grade, solid and cribriform types, with no evidence of lymphovascular invasion, ER positive (77%), GA positive (83%), and HER-2/carmen (1+) and negative.  The patient then went on to have a bilateral breast MRI with the right breast showing, within the upper inner quadrant, a spiculated enhancing mass corresponding to the biopsy-proven cancer measuring up to 2 cm in the mediolateral direction, and 1.7 cm in the anterior-posterior direction, and 1.5 cm in the craniocaudal direction; left breast showed, within the upper outer quadrant, a somewhat serpiginous area of enhancement with the more anterior aspect becoming more nodular and measuring up to 1.1 cm in size, with additional scattered nonspecific enhancing foci; the axillae were unremarkable.  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  The patient subsequently was seen with complaint of pain and burning sensation and a new "lump" at the left breast biopsy site with a comment from the radiologist of a small hematoma with surrounding ecchymosis present on July 22, 2017, with instructions for warm compresses and pressure.  The patient ultimately saw Dr. Caroline Benítez and underwent a bilateral lumpectomy and reduction mammoplasty, which was performed on August 11, 2017, at [facility] with a finding in the right breast of an invasive ductal carcinoma, moderately differentiated, measuring 2 cm in greatest diameter, nuclear, [place] score 7/9, with evidence of DCIS and LCIS present in addition to fibrocystic changes with margins negative for carcinoma, with 0/3 sentinel lymph nodes involved with carcinoma; the left breast excisional biopsy showed evidence of ductal carcinoma in situ with microinvasion, with DCIS being of high nuclear grade, solid and comedo type with central necrosis, evidence of lobular carcinoma in situ, with margins of resection negative for DCIS and microinvasion.  The patient returned to the operating room on October 13, 2017 for a left sentinel lymph node biopsy with a finding of 0/3 left sentinel lymph nodes involved with carcinoma.  She subsequently had Oncotype DX testing sent off on her right breast carcinoma with a finding of a recurrence score of 7, correlating to an 8% risk of distant recurrence within 10 years should she take tamoxifen alone.  The patient saw a medical oncologist, Dr. Maldonado, at [facility] who recommended adjuvant chemotherapy with CMF.  She also had BRCA testing sent off, specifically a BRCA 1/2 [employer] 3-site mutation panel, which returned negative of note.\par \par She saw me in initial consultation in 11/17 and I recommended that she proceed with further genetic predisposition testing which returned negative for other known genetic predisposition mutations. Started Tamoxifen on October 24th 2017. She completed RT at [place] () on 2/13/18.   [de-identified] : right breast IDC; left breast DCIS [de-identified] : The pt is here for an urgent visit.. she called yesterday c/o not feeling well.\par \par Today she c/o:\par 1)  approx 1 month of increasing forgetfulness ie calling her mother several times and not realizing she spoke to her earlier in the day and not remembering the conversation amongst several other events almost every day\par 2) "dizziness", not vertigo, not related to change in position, no loss of balance, no change in vision, no N/V\par 3) weight gain "15 lbs" not clearly related to eating more or exercising less\par 4) hair thinning and mild hoarseness \par 5) night sweats x 3-4 weeks\par \par Denies HA, CP, SOB, N/V/D. Reports also being very stressed by ongoing divorce. \par \par \par 6/18 B/L mammo neg\par \par

## 2019-06-24 ENCOUNTER — APPOINTMENT (OUTPATIENT)
Dept: BREAST CENTER | Facility: CLINIC | Age: 44
End: 2019-06-24
Payer: COMMERCIAL

## 2019-06-24 VITALS
BODY MASS INDEX: 30.36 KG/M2 | HEART RATE: 87 BPM | SYSTOLIC BLOOD PRESSURE: 123 MMHG | HEIGHT: 62 IN | WEIGHT: 165 LBS | DIASTOLIC BLOOD PRESSURE: 77 MMHG | TEMPERATURE: 97.4 F

## 2019-06-24 PROCEDURE — 99214 OFFICE O/P EST MOD 30 MIN: CPT

## 2019-06-24 NOTE — ADDENDUM
[FreeTextEntry1] : 6/13/19 ZP Magno SmmgT: compared to priors, dense, stable post surgical changes, R superior central slightly medial and upper L, possibly dystrophic. BR0. Rec magno Dmmg/Mag/lateral views rec. Management of breast pain should be managed clinically.\par 6/20/19 ZP Magno Dmmg/mag, dense, R coarse calcs ant to lumpectomy site in superior central breast possibly surrounding central lucency and L UOQ likely dystrophic calcs. Short term f/u advised'. BR3

## 2019-06-24 NOTE — HISTORY OF PRESENT ILLNESS
[FreeTextEntry1] : 42 y/o BRCA neg 6/17 female. S/p bilat BCS with R SLN, bilat breast reduction, oncoplastic surgery and biozorb insertion on 8/11/17 for RIGHT UIQ [id number] Infiltrating ductal Cancer, margins 1.2 cm, Gr 2. LN 1/4, ER/AL positive and [id number] neg. Oncotype was 7. LEFT was pT1mic, N0, Margins neg at 5 mm. ER+/AL neg and Her2 neg. s/p L SLNBx due to microinvasion on final path 10/13/17. \par Sees medical  oncologist: Wade Alejo, Pt is on tamoxifen. \par C/O mild diffuse left breast pain off and on X 2 weeks. Wonders if she slept in an unusual position. Also perimenopausal. No other breast complaints. Good ROM. No LE. Doing arm exercises at home. Finished XRT 2/18/18.  Med onc is Dr. Alejo. Discharged from Dr. Gatica and Dr. Nogueira (plastic surgery and radiation oncologist). \par 6/6/18 Z-P Bilat mmg, dense breasts, postop changes from BCS, reduction bilat. No suspicious findings, BR2. 6/6/18 ZP- Magno U/S Left 12:00 N2 4X3X4 mm cyst. Seroma measuring 2.2X0.5X1.0cm 1:00 at lumpectomy scar. Right postsurgical change @ 1:00. BR2.\par \par \par

## 2019-06-24 NOTE — PAST MEDICAL HISTORY
[Menstruating] : The patient is menstruating [Menarche Age ____] : age at menarche was [unfilled] [Total Preg ___] : G[unfilled] [Living ___] : Living: [unfilled] [Age At Live Birth ___] : Age at live birth: [unfilled] [FreeTextEntry6] : none [FreeTextEntry8] : 6 mos [FreeTextEntry7] : 6 mos

## 2019-06-24 NOTE — ASSESSMENT
[FreeTextEntry1] : 44 y/o BRCA neg 6/17 female here for f.u, had recent mmg. \par S/p bilat BCS with R SLN, bilat breast reduction, oncoplastic surgery and biozorb insertion on 8/11/17 for RIGHT UIQ [id number] Infiltrating ductal Cancer, margins 1.2 cm, Gr 2. LN 1/4, ER/LA positive and [id number] neg. Oncotype was 7. LEFT was pT1mic, N0, Margins neg at 5 mm. ER+/LA neg and Her2 neg. s/p L SLNBx due to microinvasion on final path 10/13/17. \par Sees medical  oncologist: Wade Alejo, Started tamoxifen 1024/17. Finished EBRT 2/18/18 Dr. Nogueira rad onc. Discharged plastic surgery Dr. Gatica.\par Pt was recently diagnosed with LYME earlier this month. \par \par 6/13/19 ZP Magno SmmgT/US: compared to priors, dense, stable post surgical changes, R superior central slightly medial and upper L, possibly dystrophic. BR0. Rec magno Dmmg/Mag/lateral views rec. Management of breast pain should be managed clinically. \par 6/13/19 ZP Magno US, compared to priors in PACS, R 1:00 N3 stable surgical scar, 2:00  N3 (93X5mm) cyst; L 1:00 N9 surgical scar w subjacent small fluid postsurgical seromas/cysts (1.9X0.6X0.9) and ([id number]). No susp findings magno. Management of breast pain should be managed clinically.BR2\par 6/6/18 Z-P Bilat mmg, dense breasts, postop changes from BCS, reduction bilat. No suspicious findings, BR2. 6/6/18 ZP- Magno U/S Left 12:00 N2 4X3X4 mm cyst. Seroma measuring 2.2X0.5X1.0cm 1:00 at lumpectomy scar. Right postsurgical change @ 1:00. BR3.\par \par CBE: CORIE, bilat reduction scars, biozorb slightly felt on right. No suspicious adenopathy. \par Reviewed mmg and u/s, no suspicious findings.\par Also pt's dx was <51 y/o rec, screening MRI alternating as per ACR recommendations. \par \par

## 2019-06-24 NOTE — PAST MEDICAL HISTORY
[Menstruating] : The patient is menstruating [Menarche Age ____] : age at menarche was [unfilled] [Total Preg ___] : G[unfilled] [Living ___] : Living: [unfilled] [Age At Live Birth ___] : Age at live birth: [unfilled] [FreeTextEntry6] : none [FreeTextEntry7] : 6 mos [FreeTextEntry8] : 6 mos

## 2019-06-24 NOTE — PHYSICAL EXAM
[Normocephalic] : normocephalic [Atraumatic] : atraumatic [Supple] : supple [No Supraclavicular Adenopathy] : no supraclavicular adenopathy [Examined in the supine and seated position] : examined in the supine and seated position [Bra Size: ___] : Bra Size: [unfilled] [No dominant masses] : no dominant masses in right breast  [No dominant masses] : no dominant masses left breast [No Nipple Retraction] : no left nipple retraction [No Nipple Discharge] : no left nipple discharge [No Axillary Lymphadenopathy] : no left axillary lymphadenopathy [No Edema] : no edema [No Rashes] : no rashes [No Ulceration] : no ulceration [No Thyromegaly] : no thyromegaly [No Swelling] : no swelling [Full ROM] : full range of motion [Breast Nipple Retraction] : nipples not retracted [Breast Nipple Inversion] : nipples not inverted [Breast Abnormal Lactation (Galactorrhea)] : no galactorrhea [Breast Nipple Flattening] : nipples not flattened [Breast Nipple Fissures] : nipples not fissured [Breast Abnormal Secretion Bloody Fluid] : no bloody discharge [Breast Abnormal Secretion Serous Fluid] : no serous discharge [de-identified] : S/P Magno BCS with R SLNBx and biosorb, magno reduction mammoplasty scars 8/2017 [Breast Abnormal Secretion Opalescent Fluid] : no milky discharge [de-identified] : The biozorb can be slightly felt in UIQ.  [de-identified] : Biozorb is not felt.

## 2019-06-24 NOTE — HISTORY OF PRESENT ILLNESS
[FreeTextEntry1] : 44 y/o BRCA neg 6/17 female here for f.u, had recent mmg. \par S/p bilat BCS with R SLN, bilat breast reduction, oncoplastic surgery and biozorb insertion on 8/11/17 for RIGHT UIQ [id number] Infiltrating ductal Cancer, margins 1.2 cm, Gr 2. LN 1/4, ER/FL positive and [id number] neg. Oncotype was 7. LEFT was pT1mic, N0, Margins neg at 5 mm. ER+/FL neg and Her2 neg. s/p L SLNBx due to microinvasion on final path 10/13/17. \par Sees medical  oncologist: Wade Alejo, Started tamoxifen 1024/17. Finished EBRT 2/18/18 Dr. Nogueira rad onc. Discharged plastic surgery Dr. Gatica.\par Pt was recently diagnosed with LYME earlier this month. \par \par 6/13/19 ZP Magno SmmgT/US: compared to priors, dense, stable post surgical changes, R superior central slightly medial and upper L, possibly dystrophic. BR0. Rec magno Dmmg/Mag/lateral views rec. Management of breast pain should be managed clinically. \par 6/13/19 ZP Magno US, compared to priors in PACS, R 1:00 N3 stable surgical scar, 2:00  N3 (93X5mm) cyst; L 1:00 N9 surgical scar w subjacent small fluid postsurgical seromas/cysts (1.9X0.6X0.9) and ([id number]). No susp findings magno. Management of breast pain should be managed clinically.BR2\par 6/6/18 Z-P Bilat mmg, dense breasts, postop changes from BCS, reduction bilat. No suspicious findings, BR2. 6/6/18 ZP- Magno U/S Left 12:00 N2 4X3X4 mm cyst. Seroma measuring 2.2X0.5X1.0cm 1:00 at lumpectomy scar. Right postsurgical change @ 1:00. BR3.\par \par \par

## 2019-06-24 NOTE — DATA REVIEWED
[FreeTextEntry1] : 6/13/19 ZP Magno SmmgT/Magno US: compared to priors, dense, stable post surgical changes, R superior central slightly medial and upper L, possibly dystrophic. BR0. Rec magno Dmmg/Mag/lateral views rec. Management of breast pain should be managed clinically.\par 6/13/19 ZP Magno US, compared to priors in PACS, R 1:00 N3 stable surgical scar, 2:00  N3 (93X5mm) cyst; L 1:00 N9 surgical scar w subjacent small fluid postsurgical seromas/cysts (1.9X0.6X0.9) and (7L6W3hb). No susp findings magno. Management of breast pain should be managed clinically.BR2\par 6/20/19 ZP Magno Dmmg/mag, dense, R coarse calcs ant to lumpectomy site in superior central breast possibly surrounding central lucency and L UOQ likely dystrophic calcs. Short term f/u advised'. BR3

## 2019-06-24 NOTE — PHYSICAL EXAM
[Atraumatic] : atraumatic [Normocephalic] : normocephalic [Supple] : supple [No Supraclavicular Adenopathy] : no supraclavicular adenopathy [No Thyromegaly] : no thyromegaly [No dominant masses] : no dominant masses in right breast  [Examined in the supine and seated position] : examined in the supine and seated position [No dominant masses] : no dominant masses left breast [No Nipple Retraction] : no left nipple retraction [No Nipple Discharge] : no right nipple discharge [No Edema] : no edema [No Axillary Lymphadenopathy] : no left axillary lymphadenopathy [No Ulceration] : no ulceration [No Rashes] : no rashes [Full ROM] : full range of motion [No Swelling] : no swelling [de-identified] : bilat reduction scars, post op changes.  [de-identified] : Biozorb is slightly palpable in UIQ [de-identified] : No suspicious masses or lesions, not palpable.

## 2019-06-24 NOTE — ASSESSMENT
[FreeTextEntry1] : 44 y/o BRCA neg 6/17 female. S/p bilat BCS with R SLN, bilat breast reduction, oncoplastic surgery and biozorb insertion on 8/11/17 for RIGHT UIQ [id number] Infiltrating ductal Cancer, margins 1.2 cm, Gr 2. LN 1/4, ER/ME positive and [id number] neg. Oncotype was 7. LEFT was pT1mic, N0, Margins neg at 5 mm. ER+/ME neg and Her2 neg. s/p L SLNBx due to microinvasion on final path 10/13/17. \par Sees medical  oncologist: Wade Alejo, Pt is on tamoxifen. \par C/O mild diffuse left breast pain off and on X 2 weeks. Wonders if she slept in an unusual position. No other breast complaints. Good ROM. No LE. Doing arm exercises at home. Finished XRT 2/18/18.  Med onc is Dr. Alejo. Discharged from Dr. Gatica and Dr. Nogueira (plastic surgery and radiation oncologist). \par 6/6/18 Z-P Bilat mmg, dense breasts, postop changes from BCS, reduction bilat. No suspicious findings, BR2. 6/6/18 ZP- Magno U/S Left 12:00 N2 4X3X4 mm cyst. Seroma measuring 2.2X0.5X1.0cm 1:00 at lumpectomy scar. Right postsurgical change @ 1:00. BR2.\par CBE: CORIE. No lymphedema and full ROM, Right UIQ biozorb can be felt. Diffuse left breast discomfort.\par Discussed with pt that discomfort may be msk or perimenopausal due to hormonal issues. \par

## 2019-07-11 ENCOUNTER — OUTPATIENT (OUTPATIENT)
Dept: OUTPATIENT SERVICES | Facility: HOSPITAL | Age: 44
LOS: 1 days | Discharge: HOME | End: 2019-07-11

## 2019-07-11 DIAGNOSIS — C50.919 MALIGNANT NEOPLASM OF UNSPECIFIED SITE OF UNSPECIFIED FEMALE BREAST: ICD-10-CM

## 2019-07-16 ENCOUNTER — APPOINTMENT (OUTPATIENT)
Dept: HEMATOLOGY ONCOLOGY | Facility: CLINIC | Age: 44
End: 2019-07-16
Payer: COMMERCIAL

## 2019-07-16 VITALS
RESPIRATION RATE: 16 BRPM | SYSTOLIC BLOOD PRESSURE: 119 MMHG | BODY MASS INDEX: 30.45 KG/M2 | TEMPERATURE: 98.4 F | OXYGEN SATURATION: 100 % | HEART RATE: 69 BPM | DIASTOLIC BLOOD PRESSURE: 82 MMHG | WEIGHT: 166.5 LBS

## 2019-07-16 PROCEDURE — 99214 OFFICE O/P EST MOD 30 MIN: CPT

## 2019-07-16 NOTE — HISTORY OF PRESENT ILLNESS
[de-identified] : Ms. Flowers is a [place] female who on June 2, 2017 saw her gynecologist, Dr. Corrigan, as she had menses twice in the same month and subsequently felt tired. She was then referred for routine screening mammogram, which was performed on June 5, 2017 with the finding of a right breast mass with associated architectural distortion suspicious for malignancy with ultrasound-guided core biopsy recommended. The patient also consequently had a bilateral breast ultrasound on the same date with a finding of a hypoechoic mass with irregular margins and posterior shadowing at the 1 o'clock axis of the right breast corresponding to the findings on the mammogram with no further suspicious findings; ultrasound-guided core biopsy was recommended. She ultimately had an ultrasound-guided core biopsy performed on June 12, 2017 with a finding of invasive moderately differentiated duct carcinoma, with the largest focus of invasive carcinoma measuring up to 5 mm with evidence of DCIS, intermediate nuclear grade, solid and cribriform types, with no evidence of lymphovascular invasion, ER positive (77%), MT positive (83%), and HER-2/carmen (1+) and negative. The patient then went on to have a bilateral breast MRI with the right breast showing, within the upper inner quadrant, a spiculated enhancing mass corresponding to the biopsy-proven cancer measuring up to 2 cm in the mediolateral direction, and 1.7 cm in the anterior-posterior direction, and 1.5 cm in the craniocaudal direction; left breast showed, within the upper outer quadrant, a somewhat serpiginous area of enhancement with the more anterior aspect becoming more nodular and measuring up to 1.1 cm in size, with additional scattered nonspecific enhancing foci; the axillae were unremarkable. 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 The patient subsequently was seen with complaint of pain and burning sensation and a new "lump" at the left breast biopsy site with a comment from the radiologist of a small hematoma with surrounding ecchymosis present on July 22, 2017, with instructions for warm compresses and pressure. The patient ultimately saw Dr. Caroline Benítez and underwent a bilateral lumpectomy and reduction mammoplasty, which was performed on August 11, 2017, at [facility] with a finding in the right breast of an invasive ductal carcinoma, moderately differentiated, measuring 2 cm in greatest diameter, nuclear, Genesee score 7/9, with evidence of DCIS and LCIS present in addition to fibrocystic changes with margins negative for carcinoma, with 0/3 sentinel lymph nodes involved with carcinoma; the left breast excisional biopsy showed evidence of ductal carcinoma in situ with microinvasion, with DCIS being of high nuclear grade, solid and comedo type with central necrosis, evidence of lobular carcinoma in situ, with margins of resection negative for DCIS and microinvasion. The patient returned to the operating room on October 13, 2017 for a left sentinel lymph node biopsy with a finding of 0/3 left sentinel lymph nodes involved with carcinoma. She subsequently had Oncotype DX testing sent off on her right breast carcinoma with a finding of a recurrence score of 7, correlating to an 8% risk of distant recurrence within 10 years should she take tamoxifen alone. The patient saw a medical oncologist, Dr. Maldonado, at [facility] who recommended adjuvant chemotherapy with CMF. She also had BRCA testing sent off, specifically a BRCA 1/2 [employer] 3-site mutation panel, which returned negative of note.\par \par She saw me in initial consultation in 11/17 and I recommended that she proceed with further genetic predisposition testing which returned negative for other known genetic predisposition mutations. Started Tamoxifen on October 24th 2017. She completed RT at [place] () on 2/13/18. \par \par Disease: breast cancer  \par Pathology: right breast IDC; left breast DCIS \par TNM stage: T1, N0, M0 \par AJCC Stage: 1  [de-identified] : Since last visit, she had an MRI brain and CT C/A/P without evidence of metastatic disease. She followed up with her PCP and was found to have Lyme IgG Ab P23 s/p doxycycline 100mg BID x 21 days in the beginning of June.  She was prescribed another 21 days but refused to take it due to side effects.  Since then, she reports improvement in her forgetfulness, lightheadedness, fatigue, hair thinning.  Her mild hoarseness is unchanged. Fevers and night sweats resolved. She reports dietary changes to include leroy and has had intentional weight loss.  Thyroid workup neg, mildly elevated TSH.  \par \par Menstrual cycle is regular.   Her last menstruation was in mid June. Denies HA, CP, SOB, N/V/D. Ongoing stress due to divorce.  Continues to work part time and goes to school. \par \par 6/20/19 B/L mammo BIRADS3, b/l calcifications present.  US breast with breast seroma/cysts (right 9mm, left largest 2cm at 1:00).

## 2019-07-16 NOTE — ASSESSMENT
[FreeTextEntry1] : Clinically stable and CORIE.  Had a mammogram in 6/2019 which showed BIRADS3 due to bilateral calcifications.  Seen by Dr. Benítez.  Pt to get b/l mammo and b/l MRI in 12/2019.  FU in 6 months.  Would continue tamoxifen 20mg daily.  Tolerating well.

## 2019-07-16 NOTE — PHYSICAL EXAM
[Restricted in physically strenuous activity but ambulatory and able to carry out work of a light or sedentary nature] : Status 1- Restricted in physically strenuous activity but ambulatory and able to carry out work of a light or sedentary nature, e.g., light house work, office work [Normal] : affect appropriate [de-identified] : s/p B/L red mammoplasty with well healed scars, right breast 1-2 o'clock axis approx 1cm mobile denisty, nontender; B/L ax neg.

## 2019-07-16 NOTE — REVIEW OF SYSTEMS
[Fatigue] : fatigue [Negative] : Allergic/Immunologic [Fever] : no fever [Chills] : no chills [Night Sweats] : no night sweats [Recent Change In Weight] : ~T no recent weight change [Abdominal Pain] : no abdominal pain [Vomiting] : no vomiting [Constipation] : no constipation [Diarrhea] : no diarrhea [FreeTextEntry7] : intermittent abdominal cramping [de-identified] : as above

## 2019-08-12 ENCOUNTER — TRANSCRIPTION ENCOUNTER (OUTPATIENT)
Age: 44
End: 2019-08-12

## 2019-12-20 ENCOUNTER — APPOINTMENT (OUTPATIENT)
Dept: BREAST CENTER | Facility: CLINIC | Age: 44
End: 2019-12-20
Payer: COMMERCIAL

## 2019-12-20 VITALS
DIASTOLIC BLOOD PRESSURE: 84 MMHG | SYSTOLIC BLOOD PRESSURE: 131 MMHG | HEART RATE: 92 BPM | BODY MASS INDEX: 30.36 KG/M2 | WEIGHT: 165 LBS | HEIGHT: 62 IN | TEMPERATURE: 98.2 F

## 2019-12-20 PROCEDURE — 99214 OFFICE O/P EST MOD 30 MIN: CPT

## 2019-12-20 NOTE — ASSESSMENT
[FreeTextEntry1] : 43 y/o BRCA neg female here for f.u, had recent mmg/US results.  Pt states left breast was swollen and warm and then resolved.  Now some discomfort. \par S/p bilat BCS with R SLN, bilat breast reduction, oncoplastic surgery and biozorb insertion on 8/11/17 for RIGHT UIQ [id number] Infiltrating ductal Cancer, margins 1.2 cm, Gr 2. LN 1/4, ER/IL positive and [id number] neg. Oncotype was 7. LEFT was pT1mic, N0, Margins neg at 5 mm. ER+/IL neg and Her2 neg. s/p L SLNBx due to microinvasion on final path 10/13/17. \par Sees medical oncologist: Wade Alejo, Started tamoxifen 1024/17. Finished EBRT 2/18/18 Dr. Nogueira rad onc. Discharged plastic surgery Dr. Gatica.\par Pt was recently diagnosed with LYME. \par 12/16/19 ZP MRI, compared to 6/30/17, mmg 12/16/19, Magno scattered enhancing nonspecific foci, Magno post surgical changes, no abn findings; no lymphadenopathy, BR2.\par 12/16/19 ZP Magno DmmgT; compared to priors, Stable post treatment changes, magno calcs unchanged, Stable benign findings. BR3 Magno Dmmg w mag views rec in 6 mo. \par \par 6/13/19 ZP Magno SmmgT/US: compared to priors, dense, stable post surgical changes, R superior central slightly medial and upper L, possibly dystrophic. BR0. Rec magno Dmmg/Mag/lateral views rec. Management of breast pain should be managed clinically. \par 6/13/19 ZP Magno US, compared to priors in PACS, R 1:00 N3 stable surgical scar, 2:00 N3 (93X5mm) cyst; L 1:00 N9 surgical scar w subjacent small fluid postsurgical seromas/cysts (1.9X0.6X0.9) and (6L6S7tm). No susp findings magno. Management of breast pain should be managed clinically.BR2\par 6/6/18 Z-P Bilat mmg, dense breasts, postop changes from BCS, reduction bilat. No suspicious findings, BR2. 6/6/18 ZP- Magno U/S Left 12:00 N2 4X3X4 mm cyst. Seroma measuring 2.2X0.5X1.0cm 1:00 at lumpectomy scar. Right postsurgical change @ 1:00. BR3.\par CBE: Bilat reduction scars.  UIQ biozorb slightly palpable. No adenopathy. CORIE.\par PT to see ID doctor, for fatigue since diagnosed for LYME since spring. \par

## 2019-12-20 NOTE — PHYSICAL EXAM
[Normocephalic] : normocephalic [Atraumatic] : atraumatic [Supple] : supple [No Supraclavicular Adenopathy] : no supraclavicular adenopathy [Examined in the supine and seated position] : examined in the supine and seated position [Bra Size: ___] : Bra Size: [unfilled] [No dominant masses] : no dominant masses in right breast  [No dominant masses] : no dominant masses left breast [No Nipple Retraction] : no left nipple retraction [No Nipple Discharge] : no left nipple discharge [No Axillary Lymphadenopathy] : no left axillary lymphadenopathy [No Edema] : no edema [No Swelling] : no swelling [Full ROM] : full range of motion [No Rashes] : no rashes [No Ulceration] : no ulceration [de-identified] : Biozorb slightly palpable at UIQ.  [de-identified] : Bilat reduction scars.

## 2019-12-20 NOTE — REVIEW OF SYSTEMS
[Feeling Poorly] : feeling poorly [Feeling Tired] : feeling tired [Pelvic Pain] : pelvic pain [Arthralgias] : arthralgias [Joint Pain] : joint pain [Joint Swelling] : joint swelling [Joint Stiffness] : joint stiffness [Negative] : Heme/Lymph [FreeTextEntry2] : recently diagnosed with Lyme disease. Was treated with doxycycline.  [FreeTextEntry8] : menorrhagia, perimenopausal. Had pelvic u/s, has cyst.

## 2019-12-20 NOTE — DATA REVIEWED
[FreeTextEntry1] : 12/16/19 ZP MRI, compared to 6/30/17, mmg 12/16/19, Magno scattered enhancing nonspecific foci, Magno post surgical changes, no abn findings; no lymphadenopathy, BR2.\par 12/16/19 ZP Magno DmmgT; compared to priors, Stable post treatment changes, magno calcs unchanged, Stable benign findings. BR3 Magno Dmmg w mag views rec in 6 mo.

## 2019-12-20 NOTE — HISTORY OF PRESENT ILLNESS
[FreeTextEntry1] : 45 y/o BRCA neg female here for f.u, had recent mmg/US results. \par S/p bilat BCS with R SLN, bilat breast reduction, oncoplastic surgery and biozorb insertion on 8/11/17 for RIGHT UIQ [id number] Infiltrating ductal Cancer, margins 1.2 cm, Gr 2. LN 1/4, ER/ME positive and Cxx8eks neg. Oncotype was 7. LEFT was pT1mic, N0, Margins neg at 5 mm. ER+/ME neg and Her2 neg. s/p L SLNBx due to microinvasion on final path 10/13/17. \par Sees medical  oncologist: Wade Alejo, Started tamoxifen 1024/17. Finished EBRT 2/18/18 Dr. Nogueira rad onc. Discharged plastic surgery Dr. Gatica.\par Pt was recently diagnosed with LYME. \par 12/16/19 ZP MRI, compared to 6/30/17, mmg 12/16/19, Magno scattered enhancing nonspecific foci, Magno post surgical changes, no abn findings; no lymphadenopathy, BR2.\par 12/16/19 ZP Magno DmmgT; compared to priors, Stable post treatment changes, magno calcs unchanged, Stable benign findings. BR3 Magno Dmmg w mag views rec in 6 mo. \par \par 6/13/19 ZP Magno SmmgT/US: compared to priors, dense, stable post surgical changes, R superior central slightly medial and upper L, possibly dystrophic. BR0. Rec magno Dmmg/Mag/lateral views rec. Management of breast pain should be managed clinically. \par 6/13/19 ZP Magno US, compared to priors in PACS, R 1:00 N3 stable surgical scar, 2:00  N3 (93X5mm) cyst; L 1:00 N9 surgical scar w subjacent small fluid postsurgical seromas/cysts (1.9X0.6X0.9) and (5X6B9wp). No susp findings magno. Management of breast pain should be managed clinically.BR2\par 6/6/18 Z-P Bilat mmg, dense breasts, postop changes from BCS, reduction bilat. No suspicious findings, BR2. 6/6/18 ZP- Magno U/S Left 12:00 N2 4X3X4 mm cyst. Seroma measuring 2.2X0.5X1.0cm 1:00 at lumpectomy scar. Right postsurgical change @ 1:00. BR3.\par \par \par

## 2020-01-13 ENCOUNTER — APPOINTMENT (OUTPATIENT)
Dept: FAMILY MEDICINE | Facility: CLINIC | Age: 45
End: 2020-01-13
Payer: COMMERCIAL

## 2020-01-13 VITALS
DIASTOLIC BLOOD PRESSURE: 78 MMHG | HEART RATE: 81 BPM | TEMPERATURE: 98.3 F | OXYGEN SATURATION: 99 % | SYSTOLIC BLOOD PRESSURE: 112 MMHG | WEIGHT: 174 LBS | BODY MASS INDEX: 32.02 KG/M2 | HEIGHT: 62 IN

## 2020-01-13 DIAGNOSIS — B94.8 SEQUELAE OF OTHER SPECIFIED INFECTIOUS AND PARASITIC DISEASES: ICD-10-CM

## 2020-01-13 LAB — CYTOLOGY CVX/VAG DOC THIN PREP: NORMAL

## 2020-01-13 PROCEDURE — 99203 OFFICE O/P NEW LOW 30 MIN: CPT

## 2020-01-13 RX ORDER — DOXYCYCLINE HYCLATE 100 MG/1
100 CAPSULE ORAL
Refills: 0 | Status: COMPLETED | COMMUNITY

## 2020-01-13 NOTE — PHYSICAL EXAM
[Well Nourished] : well nourished [Regular Rhythm] : with a regular rhythm [Clear to Auscultation] : lungs were clear to auscultation bilaterally [Normal Gait] : normal gait [Normal S1, S2] : normal S1 and S2 [Normal Affect] : the affect was normal [Normal Insight/Judgement] : insight and judgment were intact

## 2020-01-13 NOTE — HISTORY OF PRESENT ILLNESS
[de-identified] : 44 year old female here to establish care and for for referrals The patients complete medical, family and social history was documented and reviewed with the patient.  The patients medications were identified and also reviewed with the patient as well as any allergies to any medications. All active and previous medical problems were identified and discussed with the patient.  Any new problems were documented. \par

## 2020-01-13 NOTE — ASSESSMENT
[FreeTextEntry1] : post lyme syndrome\par patient was treated for 2 months of doxy\par still symptomatic - refer to rheum and id\par \par breast cancer\par resolved, on tamoxifen, follows with breast surgeon and onc\par \par spent time reviewing previous labs with patient

## 2020-01-13 NOTE — HEALTH RISK ASSESSMENT
[Very Good] : ~his/her~  mood as very good [Yes] : Yes [] : No [No falls in past year] : Patient reported no falls in the past year [0] : 2) Feeling down, depressed, or hopeless: Not at all (0) [[id number]] : 0 [Patient reported mammogram was normal] : Patient reported mammogram was normal [Employed] : employed [Student] : student [] :  [Fully functional (using the telephone, shopping, preparing meals, housekeeping, doing laundry, using] : Fully functional and needs no help or supervision to perform IADLs (using the telephone, shopping, preparing meals, housekeeping, doing laundry, using transportation, managing medications and managing finances) [Fully functional (bathing, dressing, toileting, transferring, walking, feeding)] : Fully functional (bathing, dressing, toileting, transferring, walking, feeding) [# Of Children ___] : has [unfilled] children [MammogramComments] : s/p breast cancer [MammogramDate] : 2019 [de-identified] : works part time and goes to school for us [FreeTextEntry3] : getting

## 2020-01-17 ENCOUNTER — LABORATORY RESULT (OUTPATIENT)
Age: 45
End: 2020-01-17

## 2020-01-17 ENCOUNTER — APPOINTMENT (OUTPATIENT)
Dept: INFECTIOUS DISEASE | Facility: CLINIC | Age: 45
End: 2020-01-17
Payer: COMMERCIAL

## 2020-01-17 VITALS
OXYGEN SATURATION: 100 % | HEIGHT: 62 IN | DIASTOLIC BLOOD PRESSURE: 83 MMHG | TEMPERATURE: 97.7 F | HEART RATE: 72 BPM | SYSTOLIC BLOOD PRESSURE: 129 MMHG

## 2020-01-17 DIAGNOSIS — Z80.6 FAMILY HISTORY OF LEUKEMIA: ICD-10-CM

## 2020-01-17 PROCEDURE — 99203 OFFICE O/P NEW LOW 30 MIN: CPT

## 2020-01-21 LAB
ALBUMIN SERPL ELPH-MCNC: 4.6 G/DL
ALP BLD-CCNC: 50 U/L
ALT SERPL-CCNC: 10 U/L
ANAPLASMA PHAGOCYTO IGM COMENT: NORMAL
ANAPLASMA PHAGOCYTO IGM COMMENT: NORMAL
ANAPLASMA PHAGOCYTOPHILIA IGG ANTIBODIES: NORMAL
ANAPLASMA PHAGOCYTOPHILIA IGM ANTIBODIES: NORMAL
ANION GAP SERPL CALC-SCNC: 12 MMOL/L
APPEARANCE: CLEAR
AST SERPL-CCNC: 16 U/L
B BURGDOR AB SER-IMP: NEGATIVE
B BURGDOR IGG+IGM SER QL IB: NORMAL
B BURGDOR IGM PATRN SER IB-IMP: NEGATIVE
B BURGDOR18KD IGG SER QL IB: NORMAL
B BURGDOR23KD IGG SER QL IB: NORMAL
B BURGDOR23KD IGM SER QL IB: NORMAL
B BURGDOR28KD IGG SER QL IB: NORMAL
B BURGDOR30KD IGG SER QL IB: NORMAL
B BURGDOR31KD IGG SER QL IB: NORMAL
B BURGDOR39KD IGG SER QL IB: NORMAL
B BURGDOR39KD IGM SER QL IB: NORMAL
B BURGDOR41KD IGG SER QL IB: PRESENT
B BURGDOR41KD IGM SER QL IB: NORMAL
B BURGDOR45KD IGG SER QL IB: NORMAL
B BURGDOR58KD IGG SER QL IB: NORMAL
B BURGDOR66KD IGG SER QL IB: NORMAL
B BURGDOR93KD IGG SER QL IB: NORMAL
B MICROTI AB SER QL: NORMAL
BABESIA ANTIBODIES, IGG: NORMAL
BABESIA ANTIBODIES, IGM: NORMAL
BASOPHILS # BLD AUTO: 0.04 K/UL
BASOPHILS NFR BLD AUTO: 0.8 %
BILIRUB SERPL-MCNC: 0.3 MG/DL
BILIRUBIN URINE: NEGATIVE
BLOOD URINE: NEGATIVE
BUN SERPL-MCNC: 17 MG/DL
CALCIUM SERPL-MCNC: 9 MG/DL
CHLORIDE SERPL-SCNC: 103 MMOL/L
CK SERPL-CCNC: 82 U/L
CMV DNA SPEC QL NAA+PROBE: NOT DETECTED
CMV IGG SERPL QL: 3.1 U/ML
CMV IGG SERPL-IMP: POSITIVE
CMV IGM SERPL QL: <8 AU/ML
CMV IGM SERPL QL: NEGATIVE
CMVPCR LOG: NOT DETECTED LOG10IU/ML
CO2 SERPL-SCNC: 24 MMOL/L
COLOR: NORMAL
CREAT SERPL-MCNC: 0.69 MG/DL
CRP SERPL-MCNC: 0.76 MG/DL
EBV EA AB SER IA-ACNC: <5 U/ML
EBV EA AB TITR SER IF: POSITIVE
EBV EA IGG SER QL IA: 331 U/ML
EBV EA IGG SER-ACNC: NEGATIVE
EBV EA IGM SER IA-ACNC: NEGATIVE
EBV PATRN SPEC IB-IMP: NORMAL
EBV VCA IGG SER IA-ACNC: >750 U/ML
EBV VCA IGM SER QL IA: <10 U/ML
EHRLICIA CHAFFEENIS IGG ANTIBODIES: NORMAL
EHRLICIA CHAFFEENIS IGG COMMENT: NORMAL
EHRLICIA CHAFFEENIS IGG INTERP: NORMAL
EHRLICIA CHAFFEENIS IGM ANTIBODIES: NORMAL
EOSINOPHIL # BLD AUTO: 0.02 K/UL
EOSINOPHIL NFR BLD AUTO: 0.4 %
EPSTEIN-BARR VIRUS CAPSID ANTIGEN IGG: POSITIVE
ERYTHROCYTE [SEDIMENTATION RATE] IN BLOOD BY WESTERGREN METHOD: 10 MM/HR
FERRITIN SERPL-MCNC: 45 NG/ML
GLUCOSE QUALITATIVE U: NEGATIVE
GLUCOSE SERPL-MCNC: 96 MG/DL
HCT VFR BLD CALC: 36.3 %
HGB BLD-MCNC: 11.7 G/DL
HIV1+2 AB SPEC QL IA.RAPID: NONREACTIVE
IMM GRANULOCYTES NFR BLD AUTO: 0.2 %
KETONES URINE: NEGATIVE
LDH SERPL-CCNC: 167 U/L
LEUKOCYTE ESTERASE URINE: NEGATIVE
LYMPHOCYTES # BLD AUTO: 1.47 K/UL
LYMPHOCYTES NFR BLD AUTO: 29 %
MAN DIFF?: NORMAL
MCHC RBC-ENTMCNC: 32.2 GM/DL
MCHC RBC-ENTMCNC: 33.1 PG
MCV RBC AUTO: 102.8 FL
MONOCYTES # BLD AUTO: 0.3 K/UL
MONOCYTES NFR BLD AUTO: 5.9 %
MPO AB + PR3 PNL SER: NORMAL
NEUTROPHILS # BLD AUTO: 3.23 K/UL
NEUTROPHILS NFR BLD AUTO: 63.7 %
NITRITE URINE: NEGATIVE
PH URINE: 5.5
PLATELET # BLD AUTO: 223 K/UL
POTASSIUM SERPL-SCNC: 3.9 MMOL/L
PROT SERPL-MCNC: 7.8 G/DL
PROTEIN URINE: NEGATIVE
RBC # BLD: 3.53 M/UL
RBC # FLD: 12.4 %
RHEUMATOID FACT SER QL: 11 IU/ML
SODIUM SERPL-SCNC: 138 MMOL/L
SPECIFIC GRAVITY URINE: 1.02
TSH SERPL-ACNC: 1.48 UIU/ML
UROBILINOGEN URINE: NORMAL
WBC # FLD AUTO: 5.07 K/UL

## 2020-01-21 NOTE — PHYSICAL EXAM
[General Appearance - In No Acute Distress] : in no acute distress [General Appearance - Alert] : alert [General Appearance - Well-Appearing] : healthy appearing [PERRL With Normal Accommodation] : pupils were equal in size, round, reactive to light [Sclera] : the sclera and conjunctiva were normal [Outer Ear] : the ears and nose were normal in appearance [Oropharynx] : the oropharynx was normal with no thrush [Neck Appearance] : the appearance of the neck was normal [Jugular Venous Distention Increased] : there was no jugular-venous distention [Neck Cervical Mass (___cm)] : no neck mass was observed [Auscultation Breath Sounds / Voice Sounds] : lungs were clear to auscultation bilaterally [Heart Rate And Rhythm] : heart rate was normal and rhythm regular [Heart Sounds] : normal S1 and S2 [Murmurs] : no murmurs [Heart Sounds Gallop] : no gallops [Heart Sounds Pericardial Friction Rub] : no pericardial rub [Edema] : there was no peripheral edema [Bowel Sounds] : normal bowel sounds [Abdomen Soft] : soft [] : no hepato-splenomegaly [Abdomen Tenderness] : non-tender [Abdomen Mass (___ Cm)] : no abdominal mass palpated [Cervical Lymph Nodes Enlarged Anterior Bilaterally] : anterior cervical [Cervical Lymph Nodes Enlarged Posterior Bilaterally] : posterior cervical [Supraclavicular Lymph Nodes Enlarged Bilaterally] : supraclavicular [Musculoskeletal - Swelling] : no joint swelling [Motor Tone] : muscle strength and tone were normal [Range of Motion to Joints] : range of motion to joints [Skin Lesions] : no skin lesions [No Focal Deficits] : no focal deficits [Oriented To Time, Place, And Person] : oriented to person, place, and time [Affect] : the affect was normal

## 2020-01-21 NOTE — REVIEW OF SYSTEMS
[Chills] : chills [Fever] : fever [Body Aches] : body aches [Feeling Sick] : feeling sick [Feeling Tired] : feeling tired [Shortness Of Breath] : shortness of breath [As Noted in HPI] : as noted in HPI [Joint Pain] : joint pain [Negative] : Heme/Lymph [Normal Appetite] : appetite not normal

## 2020-01-21 NOTE — CONSULT LETTER
[Dear  ___] : Dear  [unfilled], [Consult Letter:] : I had the pleasure of evaluating your patient, [unfilled]. [Please see my note below.] : Please see my note below. [Consult Closing:] : Thank you very much for allowing me to participate in the care of this patient.  If you have any questions, please do not hesitate to contact me. [Sincerely,] : Sincerely, [FreeTextEntry2] : Dr. Lashawn Grimm [FreeTextEntry3] : \par Ramona Kaba MD\par  of Medicine\par Division of Infectious Diseases\par [facility]\par [address] [place] [address] [address] [address]\par Tel: [phone]\par Fax: [phone]\par \par \par \par

## 2020-01-21 NOTE — ASSESSMENT
[FreeTextEntry1] : \par Pleasant 43 yo F with h/o breast cancer who presents today with concerns for lyme disease and symptoms of fevers, chills, joint pains, and body aches. \par \par Pt appears well today and is non-toxic in my office. No focal signs of infection on exam today. \par \par Unclear to me that pt had true lyme disease. Pt reported test was borderline. \par \par Will check all tick born serologies today.  \par Will also check basic work- up for fever of unknown origin given reports of fevers and chills.\par Agree with Rheum eval as well which is pending.  \par \par Would avoid more doxy for now pending results.\par \par I will call the patient with her results once available.\par \par She can return if needed.

## 2020-01-21 NOTE — HISTORY OF PRESENT ILLNESS
[FreeTextEntry1] : 45 yo F with h/o breast cancer, presents today for possible lyme disease.  No labs available for me to review today.\par \par Reports dx breast ca 10/2017.  Had b/l lumpectomy and breast reduction.  Underwent surgery on Right lymph nodes followed by further surgery on LNs and radiation.   Now on tamoxifen.\par \par Noted 5/2019 she was not feeling well.  Felt sick and fatigued. Had joint pains which worsened as the day went on.  +headaches, forgetful, tired, hands swollen, leg pain, unable to concentrate.\par \par Saw her prior PMD and she was told her lyme test was borderline. Took docy 100 mg po bid x 1month. Didn't feel completely better and she continued it for another month. \par \par She lives in [place]. Unsure where she would have gotten tick bite. No tick bites that she noticed. \par In 11/2019 she felt unwell again, similar to May 2019 and was concerned Lyme was back again. \par \par Born in [place]. [place]oved [place].  Lives with kids and . \par \par Other ROS:\par Fevers 100.2. Chills, Headaches, fatigue, back pain, poor appetite, night sweats, SOB, joint pains, muscle aches. Nausea.\par \par Also given referral to rheumatology by new PMD who she saw 1/13/2020.\par \par \par \par

## 2020-01-22 LAB
B DIV+MO-1 18S RRNA BLD QL NAA+NON-PROBE: NEGATIVE
B DUNCANI 18S RRNA BLD QL NAA+NON-PROBE: NEGATIVE
B MICROTI 18S RRNA BLD QL NAA+NON-PROBE: NEGATIVE
M TB IFN-G BLD-IMP: NEGATIVE
QUANTIFERON TB PLUS MITOGEN MINUS NIL: >10 IU/ML
QUANTIFERON TB PLUS NIL: 0.06 IU/ML
QUANTIFERON TB PLUS TB1 MINUS NIL: 0 IU/ML
QUANTIFERON TB PLUS TB2 MINUS NIL: 0 IU/ML

## 2020-01-27 LAB
ANA PAT FLD IF-IMP: ABNORMAL
ANA SER IF-ACNC: ABNORMAL

## 2020-02-26 ENCOUNTER — APPOINTMENT (OUTPATIENT)
Dept: RHEUMATOLOGY | Facility: CLINIC | Age: 45
End: 2020-02-26
Payer: COMMERCIAL

## 2020-02-26 VITALS
OXYGEN SATURATION: 100 % | WEIGHT: 169 LBS | HEART RATE: 81 BPM | RESPIRATION RATE: 16 BRPM | DIASTOLIC BLOOD PRESSURE: 72 MMHG | HEIGHT: 62 IN | BODY MASS INDEX: 31.1 KG/M2 | TEMPERATURE: 98.8 F | SYSTOLIC BLOOD PRESSURE: 110 MMHG

## 2020-02-26 DIAGNOSIS — Z82.61 FAMILY HISTORY OF ARTHRITIS: ICD-10-CM

## 2020-02-26 LAB
ALBUMIN SERPL ELPH-MCNC: 4.3 G/DL
ALP BLD-CCNC: 48 U/L
ALT SERPL-CCNC: 13 U/L
ANION GAP SERPL CALC-SCNC: 11 MMOL/L
AST SERPL-CCNC: 15 U/L
BASOPHILS # BLD AUTO: 0.04 K/UL
BASOPHILS NFR BLD AUTO: 0.9 %
BILIRUB SERPL-MCNC: 0.2 MG/DL
BUN SERPL-MCNC: 15 MG/DL
C3 SERPL-MCNC: 139 MG/DL
C4 SERPL-MCNC: 28 MG/DL
CALCIUM SERPL-MCNC: 9.1 MG/DL
CHLORIDE SERPL-SCNC: 106 MMOL/L
CK SERPL-CCNC: 65 U/L
CO2 SERPL-SCNC: 24 MMOL/L
CREAT SERPL-MCNC: 0.77 MG/DL
CRP SERPL-MCNC: 0.7 MG/DL
EOSINOPHIL # BLD AUTO: 0.02 K/UL
EOSINOPHIL NFR BLD AUTO: 0.4 %
ERYTHROCYTE [SEDIMENTATION RATE] IN BLOOD BY WESTERGREN METHOD: 33 MM/HR
GLUCOSE SERPL-MCNC: 103 MG/DL
HCT VFR BLD CALC: 36.5 %
HGB BLD-MCNC: 11.5 G/DL
IMM GRANULOCYTES NFR BLD AUTO: 0.2 %
LYMPHOCYTES # BLD AUTO: 1.12 K/UL
LYMPHOCYTES NFR BLD AUTO: 23.9 %
MAN DIFF?: NORMAL
MCHC RBC-ENTMCNC: 31.5 GM/DL
MCHC RBC-ENTMCNC: 32.4 PG
MCV RBC AUTO: 102.8 FL
MONOCYTES # BLD AUTO: 0.34 K/UL
MONOCYTES NFR BLD AUTO: 7.3 %
NEUTROPHILS # BLD AUTO: 3.15 K/UL
NEUTROPHILS NFR BLD AUTO: 67.3 %
PLATELET # BLD AUTO: 251 K/UL
POTASSIUM SERPL-SCNC: 4.6 MMOL/L
PROT SERPL-MCNC: 7.3 G/DL
RBC # BLD: 3.55 M/UL
RBC # FLD: 11.7 %
SODIUM SERPL-SCNC: 140 MMOL/L
TSH SERPL-ACNC: 1.96 UIU/ML
WBC # FLD AUTO: 4.68 K/UL

## 2020-02-26 PROCEDURE — 99205 OFFICE O/P NEW HI 60 MIN: CPT | Mod: 25

## 2020-02-26 PROCEDURE — 36415 COLL VENOUS BLD VENIPUNCTURE: CPT

## 2020-02-26 NOTE — PHYSICAL EXAM
[General Appearance - In No Acute Distress] : in no acute distress [General Appearance - Alert] : alert [Outer Ear] : the ears and nose were normal in appearance [Oropharynx] : the oropharynx was normal [Sclera] : the sclera and conjunctiva were normal [Neck Appearance] : the appearance of the neck was normal [Neck Cervical Mass (___cm)] : no neck mass was observed [Jugular Venous Distention Increased] : there was no jugular-venous distention [Thyroid Diffuse Enlargement] : the thyroid was not enlarged [Thyroid Nodule] : there were no palpable thyroid nodules [Auscultation Breath Sounds / Voice Sounds] : lungs were clear to auscultation bilaterally [Heart Sounds Gallop] : no gallops [Heart Sounds] : normal S1 and S2 [Heart Rate And Rhythm] : heart rate was normal and rhythm regular [Heart Sounds Pericardial Friction Rub] : no pericardial rub [Murmurs] : no murmurs [Bowel Sounds] : normal bowel sounds [Edema] : there was no peripheral edema [Abdomen Tenderness] : non-tender [Abdomen Soft] : soft [Abdomen Mass (___ Cm)] : no abdominal mass palpated [Cervical Lymph Nodes Enlarged Anterior Bilaterally] : anterior cervical [Cervical Lymph Nodes Enlarged Posterior Bilaterally] : posterior cervical [Supraclavicular Lymph Nodes Enlarged Bilaterally] : supraclavicular [No Spinal Tenderness] : no spinal tenderness [FreeTextEntry1] : (+)diffuse tenderness throughout B/L hands; no swollen joints;  B/L shoulders w/ pain upon abduction [Skin Color & Pigmentation] : normal skin color and pigmentation [Skin Turgor] : normal skin turgor [] : no rash [No Focal Deficits] : no focal deficits [Oriented To Time, Place, And Person] : oriented to person, place, and time [Affect] : the affect was normal [Impaired Insight] : insight and judgment were intact

## 2020-02-26 NOTE — ASSESSMENT
[FreeTextEntry1] : 44 year old female presents with polyarticular joint pains and myalgias of unclear etiology.  Some of her symptoms, including the distribution of joints (MCP's and PIP's), joint swelling, elevated CRP, and family history, are suggestive of rheumatoid arthritis, though other symptoms, including pain outside of the joints and lack of prolonged AM stiffness, are atypical.  I have therefore ordered some more bloodwork and x-rays as further workup.  She will follow up with me again in 2 weeks to review her results.  In the meantime, I have also started her on a trial of low-dose prednisone.\par

## 2020-02-26 NOTE — CONSULT LETTER
[Dear  ___] : Dear  [unfilled], [Please see my note below.] : Please see my note below. [Consult Letter:] : I had the pleasure of evaluating your patient, [unfilled]. [Consult Closing:] : Thank you very much for allowing me to participate in the care of this patient.  If you have any questions, please do not hesitate to contact me. [Sincerely,] : Sincerely, [FreeTextEntry3] : Jay Charles MD\par Rheumatology\par [facility]\par  of Medicine\par [employer] [facility] \par \par [address] [place] [address]\[place] [address]\par phone:  [phone]\par fax:      [phone]\par

## 2020-02-26 NOTE — HISTORY OF PRESENT ILLNESS
[Fatigue] : fatigue [Arthralgias] : arthralgias [Myalgias] : myalgias [Weight Loss] : no weight loss [Anorexia] : no anorexia [FreeTextEntry1] : 44 year old female with PMHx as listed below reports that about 9 months ago (5/19), she began to experience fevers, night sweats, profound fatigue, and headaches.  She went to see her oncologist, who performed extensive malignancy w/u, which was negative.  She then went to her PMD, who performed further w/u, and was told she has "borderline Lyme", for which she was started on doxycycline.  She initially felt better, but her symptoms soon recurred in 11/2019, including fevers, diffuse joint pains, and diffuse myalgias.  The pain is constant, though typically worse in the afternoons and at night.  The pain is also worse at rest and better with activity.  She describes the pain as sharp, 10 out of 10.  (+)intermittent swelling in her hands (MCP's and PIP's), knees, and lower LE's.  (+)AM stiffness, usually lasting about 20 minutes.  She gets some relief from ibuprofen and leroy/turmeric, as well as warm compresses.  No other known alleviating factors.\par No F/C, no unintentional weight loss, no night sweats, no oral ulcers, no rashes, no alopecia, no photosensitivity, no dry eyes/dry mouth, no Raynaud symptoms, no focal weakness, no dysphagia\par  [Chills] : no chills [Malaise] : no malaise [Fever] : no fever [Malar Facial Rash] : no malar facial rash [Skin Nodules] : no skin nodules [Skin Lesions] : no lesions [Oral Ulcers] : no oral ulcers [Dry Mouth] : no dry mouth [Cough] : no cough [Dysphonia] : no dysphonia [Dysphagia] : no dysphagia [Chest Pain] : no chest pain [Shortness of Breath] : no shortness of breath [Joint Deformity] : no joint deformity [Joint Swelling] : no joint swelling [Joint Warmth] : no joint warmth [Morning Stiffness] : no morning stiffness [Decreased ROM] : no decreased range of motion [Falls] : no falls [Difficulty Standing] : no difficulty standing [Difficulty Walking] : no difficulty walking [Dyspnea] : no dyspnea [Muscle Spasms] : no muscle spasms [Muscle Weakness] : no muscle weakness [Eye Pain] : no eye pain [Muscle Cramping] : no muscle cramping [Visual Changes] : no visual changes [Eye Redness] : no eye redness [Dry Eyes] : no dry eyes

## 2020-02-27 LAB
ENA RNP AB SER IA-ACNC: <0.2 AL
ENA SM AB SER IA-ACNC: <0.2 AL
ENA SS-A AB SER IA-ACNC: <0.2 AL
ENA SS-B AB SER IA-ACNC: <0.2 AL
HLA-B27 RELATED AG QL: NORMAL
THYROGLOB AB SERPL-ACNC: <20 IU/ML
THYROPEROXIDASE AB SERPL IA-ACNC: <10 IU/ML

## 2020-02-28 LAB
CCP AB SER IA-ACNC: <8 UNITS
DSDNA AB SER-ACNC: <12 IU/ML
RF+CCP IGG SER-IMP: NEGATIVE

## 2020-03-03 LAB — 14-3-3 ETA AG SER IA-MCNC: <0.2 NG/ML

## 2020-03-18 ENCOUNTER — APPOINTMENT (OUTPATIENT)
Dept: RHEUMATOLOGY | Facility: CLINIC | Age: 45
End: 2020-03-18
Payer: MEDICAID

## 2020-03-18 VITALS
HEART RATE: 68 BPM | WEIGHT: 138 LBS | SYSTOLIC BLOOD PRESSURE: 118 MMHG | OXYGEN SATURATION: 98 % | DIASTOLIC BLOOD PRESSURE: 68 MMHG | BODY MASS INDEX: 25.24 KG/M2

## 2020-03-18 DIAGNOSIS — M13.0 POLYARTHRITIS, UNSPECIFIED: ICD-10-CM

## 2020-03-18 PROCEDURE — 99214 OFFICE O/P EST MOD 30 MIN: CPT

## 2020-03-18 NOTE — HISTORY OF PRESENT ILLNESS
[Fatigue] : fatigue [Arthralgias] : arthralgias [Myalgias] : myalgias [FreeTextEntry1] : Feeling "the same" since last visit.  Still w/ polyarticular joint pains, unchanged.  (+)AM stiffness x 30 minutes.  Pt now reports that she has been experiencing LBP as well - worst at nights and in the mornings.  Also w/ pain in her left 3rd-5th toes.  No significant improvement w/ prednisone. [Anorexia] : no anorexia [Weight Loss] : no weight loss [Malaise] : no malaise [Fever] : no fever [Chills] : no chills [Malar Facial Rash] : no malar facial rash [Skin Lesions] : no lesions [Skin Nodules] : no skin nodules [Oral Ulcers] : no oral ulcers [Cough] : no cough [Dry Mouth] : no dry mouth [Dysphonia] : no dysphonia [Dysphagia] : no dysphagia [Shortness of Breath] : no shortness of breath [Chest Pain] : no chest pain [Joint Swelling] : no joint swelling [Joint Warmth] : no joint warmth [Joint Deformity] : no joint deformity [Decreased ROM] : no decreased range of motion [Morning Stiffness] : no morning stiffness [Falls] : no falls [Difficulty Standing] : no difficulty standing [Difficulty Walking] : no difficulty walking [Dyspnea] : no dyspnea [Muscle Weakness] : no muscle weakness [Muscle Spasms] : no muscle spasms [Muscle Cramping] : no muscle cramping [Visual Changes] : no visual changes [Eye Pain] : no eye pain [Eye Redness] : no eye redness [Dry Eyes] : no dry eyes

## 2020-03-18 NOTE — PHYSICAL EXAM
[General Appearance - Alert] : alert [General Appearance - In No Acute Distress] : in no acute distress [Sclera] : the sclera and conjunctiva were normal [Outer Ear] : the ears and nose were normal in appearance [Oropharynx] : the oropharynx was normal [Neck Appearance] : the appearance of the neck was normal [Neck Cervical Mass (___cm)] : no neck mass was observed [Jugular Venous Distention Increased] : there was no jugular-venous distention [Thyroid Diffuse Enlargement] : the thyroid was not enlarged [Thyroid Nodule] : there were no palpable thyroid nodules [Auscultation Breath Sounds / Voice Sounds] : lungs were clear to auscultation bilaterally [Heart Rate And Rhythm] : heart rate was normal and rhythm regular [Heart Sounds] : normal S1 and S2 [Heart Sounds Gallop] : no gallops [Murmurs] : no murmurs [Heart Sounds Pericardial Friction Rub] : no pericardial rub [Edema] : there was no peripheral edema [Bowel Sounds] : normal bowel sounds [Abdomen Soft] : soft [Abdomen Tenderness] : non-tender [Abdomen Mass (___ Cm)] : no abdominal mass palpated [Cervical Lymph Nodes Enlarged Posterior Bilaterally] : posterior cervical [Cervical Lymph Nodes Enlarged Anterior Bilaterally] : anterior cervical [Supraclavicular Lymph Nodes Enlarged Bilaterally] : supraclavicular [No Spinal Tenderness] : no spinal tenderness [Skin Color & Pigmentation] : normal skin color and pigmentation [Skin Turgor] : normal skin turgor [] : no rash [No Focal Deficits] : no focal deficits [Oriented To Time, Place, And Person] : oriented to person, place, and time [Impaired Insight] : insight and judgment were intact [Affect] : the affect was normal [FreeTextEntry1] : (+)diffuse tenderness throughout B/L hands; no swollen joints;  B/L shoulders w/ pain upon abduction

## 2020-03-18 NOTE — ASSESSMENT
[FreeTextEntry1] : 44 year old female with polyarticular joint pains and low back pain and found to have (+)HLA-B27.  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  DDx also includes AE's from tamoxifen, though less likely.\par   - Rx naproxen 500mg BID.\par   - x-rays of L-spine, S-I joints.\par

## 2020-04-27 ENCOUNTER — RX RENEWAL (OUTPATIENT)
Age: 45
End: 2020-04-27

## 2020-05-02 ENCOUNTER — TRANSCRIPTION ENCOUNTER (OUTPATIENT)
Age: 45
End: 2020-05-02

## 2020-05-13 ENCOUNTER — APPOINTMENT (OUTPATIENT)
Dept: HEMATOLOGY ONCOLOGY | Facility: CLINIC | Age: 45
End: 2020-05-13

## 2020-05-13 ENCOUNTER — APPOINTMENT (OUTPATIENT)
Dept: HEMATOLOGY ONCOLOGY | Facility: CLINIC | Age: 45
End: 2020-05-13
Payer: MEDICAID

## 2020-05-13 PROCEDURE — 99214 OFFICE O/P EST MOD 30 MIN: CPT | Mod: 95

## 2020-05-13 RX ORDER — FAMOTIDINE 20 MG/1
20 TABLET, FILM COATED ORAL DAILY
Qty: 30 | Refills: 2 | Status: DISCONTINUED | COMMUNITY
Start: 2020-02-26 | End: 2020-05-13

## 2020-05-13 RX ORDER — PREDNISONE 10 MG/1
10 TABLET ORAL
Qty: 30 | Refills: 1 | Status: DISCONTINUED | COMMUNITY
Start: 2020-02-26 | End: 2020-05-13

## 2020-05-13 NOTE — REVIEW OF SYSTEMS
[Negative] : Endocrine [Fever] : no fever [Chills] : no chills [Night Sweats] : no night sweats [Recent Change In Weight] : ~T no recent weight change [Abdominal Pain] : no abdominal pain [Vomiting] : no vomiting [Diarrhea] : no diarrhea [Constipation] : no constipation [FreeTextEntry2] : as above [FreeTextEntry7] : as above [FreeTextEntry9] : as above

## 2020-05-13 NOTE — HISTORY OF PRESENT ILLNESS
[Home] : at home, [unfilled] , at the time of the visit. [Medical Office: ([facility])___] : at the medical office located in  [Patient] : the patient [Self] : self [de-identified] : Ms. Flowers is a [place] female who on June 2, 2017 saw her gynecologist, Dr. Corrigan, as she had menses twice in the same month and subsequently felt tired. She was then referred for routine screening mammogram, which was performed on June 5, 2017 with the finding of a right breast mass with associated architectural distortion suspicious for malignancy with ultrasound-guided core biopsy recommended. The patient also consequently had a bilateral breast ultrasound on the same date with a finding of a hypoechoic mass with irregular margins and posterior shadowing at the 1 o'clock axis of the right breast corresponding to the findings on the mammogram with no further suspicious findings; ultrasound-guided core biopsy was recommended. She ultimately had an ultrasound-guided core biopsy performed on June 12, 2017 with a finding of invasive moderately differentiated duct carcinoma, with the largest focus of invasive carcinoma measuring up to 5 mm with evidence of DCIS, intermediate nuclear grade, solid and cribriform types, with no evidence of lymphovascular invasion, ER positive (77%), HI positive (83%), and HER-2/carmen (1+) and negative. The patient then went on to have a bilateral breast MRI with the right breast showing, within the upper inner quadrant, a spiculated enhancing mass corresponding to the biopsy-proven cancer measuring up to 2 cm in the mediolateral direction, and 1.7 cm in the anterior-posterior direction, and 1.5 cm in the craniocaudal direction; left breast showed, within the upper outer quadrant, a somewhat serpiginous area of enhancement with the more anterior aspect becoming more nodular and measuring up to 1.1 cm in size, with additional scattered nonspecific enhancing foci; the axillae were unremarkable. 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 The patient subsequently was seen with complaint of pain and burning sensation and a new "lump" at the left breast biopsy site with a comment from the radiologist of a small hematoma with surrounding ecchymosis present on July 22, 2017, with instructions for warm compresses and pressure. The patient ultimately saw Dr. Caroline Benítez and underwent a bilateral lumpectomy and reduction mammoplasty, which was performed on August 11, 2017, at [facility] with a finding in the right breast of an invasive ductal carcinoma, moderately differentiated, measuring 2 cm in greatest diameter, nuclear, Empire score 7/9, with evidence of DCIS and LCIS present in addition to fibrocystic changes with margins negative for carcinoma, with 0/3 sentinel lymph nodes involved with carcinoma; the left breast excisional biopsy showed evidence of ductal carcinoma in situ with microinvasion, with DCIS being of high nuclear grade, solid and comedo type with central necrosis, evidence of lobular carcinoma in situ, with margins of resection negative for DCIS and microinvasion. The patient returned to the operating room on October 13, 2017 for a left sentinel lymph node biopsy with a finding of 0/3 left sentinel lymph nodes involved with carcinoma. She subsequently had Oncotype DX testing sent off on her right breast carcinoma with a finding of a recurrence score of 7, correlating to an 8% risk of distant recurrence within 10 years should she take tamoxifen alone. The patient saw a medical oncologist, Dr. Maldonado, at [facility] who recommended adjuvant chemotherapy with CMF. She also had BRCA testing sent off, specifically a BRCA 1/2 [employer] 3-site mutation panel, which returned negative of note.\par \par She saw me in initial consultation in 11/17 and I recommended that she proceed with further genetic predisposition testing which returned negative for other known genetic predisposition mutations. Started Tamoxifen on October 24th 2017. She completed RT at [place] () on 2/13/18. \par \par Disease: breast cancer  \par Pathology: right breast IDC; left breast DCIS \par TNM stage: T1, N0, M0 \par AJCC Stage: 1  [de-identified] : Seen today for a telehealth f/u visit secondary to the Covid crisis. \par \par In the past she had c/o increasing arthralgias and joint swelling. She followed up with her PCP and was found to have Lyme IgG Ab P23 s/p doxycycline 100mg BID x 21 days in the beginning of June 2019.  She was prescribed another 21 days but refused to take it due to side effects.  Since then, she reported improvement in her forgetfulness, lightheadedness, fatigue, hair thinning.  Fevers and night sweats resolved. Thyroid workup neg, mildly elevated TSH.  \par \par She was last seen in 7/19. In the interim she c/o gradually worsening arthralgias and body aches especially since 11/19. She saw a new PCP - Dr Grimm who then referred her for an ID consult, Dr Kaba, and had no serologic evidence of Lyme Dz, or other active infections. She then had a rheum consult with Dr. Charles, who assessed:\par \par "44 year old female presents with polyarticular joint pains and myalgias of unclear etiology. Some of her symptoms, including the distribution of joints (MCP's and PIP's), joint swelling, elevated CRP, and family history, are suggestive of rheumatoid arthritis, though other symptoms, including pain outside of the joints and lack of prolonged AM stiffness, are atypical. I have therefore ordered some more bloodwork and x-rays as further workup. She will follow up with me again in 2 weeks to review her results. In the meantime, I have also started her on a trial of low-dose prednisone."\par \par Pt reports prednisone did not lead to any improvement so she d/c'd it.  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 DDx also includes AE's from tamoxifen, though less likely.\par  - Rx naproxen 500mg BID.\par  - x-rays of L-spine, S-I joints.\par  \par Pt will have a further f/u in th enext several weeks. \par \par She reports ongoing arthralgias, joint swelling, mylagias an dchronic low grade fatigue - denies all other symptoms  \par \par In early 2/20 she had contact with her ex- and his girlfriend who subsequently tested Covid +. The pt subsequently developed fever for several day up to 104, vomiting x 1 day (>10 times) and went to [facility] ED and had a blood workup an dCT scans which she self reports came back neg. She was sent home an dthese symptoms resolved.  She reports having a Covid test approx 1 mo ago which returned negative - she did not have a Covid Ab test and is asking whether she should as she suspects she may have had Covid. \par \par Menstrual cycle is regular. LMP currently.\par \par 12/19 mammo/sono /MRI neg / benign findings.

## 2020-05-24 ENCOUNTER — TRANSCRIPTION ENCOUNTER (OUTPATIENT)
Age: 45
End: 2020-05-24

## 2020-06-05 ENCOUNTER — APPOINTMENT (OUTPATIENT)
Dept: RHEUMATOLOGY | Facility: CLINIC | Age: 45
End: 2020-06-05

## 2020-06-08 ENCOUNTER — APPOINTMENT (OUTPATIENT)
Dept: RHEUMATOLOGY | Facility: CLINIC | Age: 45
End: 2020-06-08
Payer: MEDICAID

## 2020-06-08 PROCEDURE — 99215 OFFICE O/P EST HI 40 MIN: CPT | Mod: 95

## 2020-06-08 NOTE — PHYSICAL EXAM
[General Appearance - Alert] : alert [Sclera] : the sclera and conjunctiva were normal [General Appearance - In No Acute Distress] : in no acute distress [Skin Color & Pigmentation] : normal skin color and pigmentation [] : no rash [Skin Turgor] : normal skin turgor [Oriented To Time, Place, And Person] : oriented to person, place, and time [Impaired Insight] : insight and judgment were intact [Affect] : the affect was normal [FreeTextEntry1] : no visible joint swelling;  decreased flexion of hand joints (pt unable to close fists);  normal ROM in rest of joints

## 2020-06-08 NOTE — HISTORY OF PRESENT ILLNESS
[Fatigue] : fatigue [Arthralgias] : arthralgias [Myalgias] : myalgias [Home] : at home, [unfilled] , at the time of the visit. [Medical Office: ([facility])___] : at the medical office located in  [Verbal consent obtained from patient] : the patient, [unfilled] [FreeTextEntry1] : Continues to experience joint pains, wesly in her hands, and lower back.  Improves temporarily w/ naproxen.  Still w/ (+)AM stiffness x 30 minutes.  [Anorexia] : no anorexia [Weight Loss] : no weight loss [Malaise] : no malaise [Fever] : no fever [Chills] : no chills [Malar Facial Rash] : no malar facial rash [Skin Lesions] : no lesions [Oral Ulcers] : no oral ulcers [Skin Nodules] : no skin nodules [Cough] : no cough [Dysphonia] : no dysphonia [Dry Mouth] : no dry mouth [Shortness of Breath] : no shortness of breath [Dysphagia] : no dysphagia [Joint Swelling] : no joint swelling [Chest Pain] : no chest pain [Joint Warmth] : no joint warmth [Joint Deformity] : no joint deformity [Decreased ROM] : no decreased range of motion [Morning Stiffness] : no morning stiffness [Difficulty Standing] : no difficulty standing [Difficulty Walking] : no difficulty walking [Falls] : no falls [Dyspnea] : no dyspnea [Muscle Weakness] : no muscle weakness [Muscle Spasms] : no muscle spasms [Muscle Cramping] : no muscle cramping [Visual Changes] : no visual changes [Eye Redness] : no eye redness [Eye Pain] : no eye pain [Dry Eyes] : no dry eyes

## 2020-06-08 NOTE — DATA REVIEWED
[FreeTextEntry1] : x-rays of L-spine:  mild degenerative changes\par x-rays of S-I joints:  unremarkable

## 2020-06-08 NOTE — ASSESSMENT
[FreeTextEntry1] : 44 year old female with polyarticular joint pains and low back pain and found to have (+)HLA-B27.  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  DDx also includes AE's from tamoxifen, though less likely.\par   - Will plan to start biologic, but need to discuss w/ heme-onc given hx of breast CA.\par   - Check labs ,including hepatitis panel.\par   - Cont naproxen 500mg BID prn for now.\par   - Quantiferon negative 1/20.\par

## 2020-06-18 ENCOUNTER — APPOINTMENT (OUTPATIENT)
Dept: BREAST CENTER | Facility: CLINIC | Age: 45
End: 2020-06-18
Payer: MEDICAID

## 2020-06-18 VITALS
HEART RATE: 81 BPM | TEMPERATURE: 97.6 F | DIASTOLIC BLOOD PRESSURE: 72 MMHG | HEIGHT: 62 IN | SYSTOLIC BLOOD PRESSURE: 118 MMHG | BODY MASS INDEX: 25.76 KG/M2 | WEIGHT: 140 LBS

## 2020-06-18 DIAGNOSIS — Z87.898 PERSONAL HISTORY OF OTHER SPECIFIED CONDITIONS: ICD-10-CM

## 2020-06-18 DIAGNOSIS — R92.1 MAMMOGRAPHIC CALCIFICATION FOUND ON DIAGNOSTIC IMAGING OF BREAST: ICD-10-CM

## 2020-06-18 LAB
ALBUMIN SERPL ELPH-MCNC: 4.6 G/DL
ALP BLD-CCNC: 54 U/L
ALT SERPL-CCNC: 10 U/L
ANION GAP SERPL CALC-SCNC: 14 MMOL/L
AST SERPL-CCNC: 18 U/L
BASOPHILS # BLD AUTO: 0.04 K/UL
BASOPHILS NFR BLD AUTO: 0.7 %
BILIRUB SERPL-MCNC: 0.2 MG/DL
BUN SERPL-MCNC: 17 MG/DL
CALCIUM SERPL-MCNC: 9.4 MG/DL
CHLORIDE SERPL-SCNC: 102 MMOL/L
CO2 SERPL-SCNC: 25 MMOL/L
CREAT SERPL-MCNC: 0.7 MG/DL
CRP SERPL-MCNC: 1.01 MG/DL
EOSINOPHIL # BLD AUTO: 0.02 K/UL
EOSINOPHIL NFR BLD AUTO: 0.4 %
ERYTHROCYTE [SEDIMENTATION RATE] IN BLOOD BY WESTERGREN METHOD: 17 MM/HR
GLUCOSE SERPL-MCNC: 91 MG/DL
HBV CORE IGG+IGM SER QL: NONREACTIVE
HBV SURFACE AB SER QL: NONREACTIVE
HBV SURFACE AG SER QL: NONREACTIVE
HCT VFR BLD CALC: 37 %
HCV AB SER QL: NONREACTIVE
HCV S/CO RATIO: 0.13 S/CO
HGB BLD-MCNC: 11.8 G/DL
IMM GRANULOCYTES NFR BLD AUTO: 0.2 %
LYMPHOCYTES # BLD AUTO: 1.26 K/UL
LYMPHOCYTES NFR BLD AUTO: 22.3 %
MAN DIFF?: NORMAL
MCHC RBC-ENTMCNC: 31.9 GM/DL
MCHC RBC-ENTMCNC: 32.4 PG
MCV RBC AUTO: 101.6 FL
MONOCYTES # BLD AUTO: 0.4 K/UL
MONOCYTES NFR BLD AUTO: 7.1 %
NEUTROPHILS # BLD AUTO: 3.92 K/UL
NEUTROPHILS NFR BLD AUTO: 69.3 %
PLATELET # BLD AUTO: 237 K/UL
POTASSIUM SERPL-SCNC: 4.3 MMOL/L
PROT SERPL-MCNC: 7.7 G/DL
RBC # BLD: 3.64 M/UL
RBC # FLD: 12.3 %
SODIUM SERPL-SCNC: 141 MMOL/L
WBC # FLD AUTO: 5.65 K/UL

## 2020-06-18 PROCEDURE — 99214 OFFICE O/P EST MOD 30 MIN: CPT

## 2020-06-18 NOTE — DATA REVIEWED
Addended by: Makenzie Aldana on: 7/17/2019 03:36 PM     Modules accepted: Orders
[FreeTextEntry1] : 6/5/20 ZP Magno SmmgT/US: priors to 2017, dense, magno post surgical changes, stable calcs with dystropic appearance, no susp findings noted magno. BR2\par 6/5/20 Z Magno US: compared to 2019, R 1:00 N3 scar, 2;00 N3 (5mm) stable cyst; L 1:00 N9 (1.4cm), (0.5cm) and (1.3cm) seromas. BR2\par

## 2020-06-18 NOTE — PAST MEDICAL HISTORY
[Menarche Age ____] : age at menarche was [unfilled] [Total Preg ___] : G[unfilled] [Living ___] : Living: [unfilled] [Age At Live Birth ___] : Age at live birth: [unfilled] [Perimenopausal] : The patient is perimenopausal [FreeTextEntry6] : none [FreeTextEntry7] : 6 mos [FreeTextEntry8] : 6 mos

## 2020-06-18 NOTE — HISTORY OF PRESENT ILLNESS
[FreeTextEntry1] : 43 y/o BRCA neg female here for f.u, had recent mmg/US results. \par S/p bilat BCS with R SLN, bilat breast reduction, oncoplastic surgery and biozorb insertion on 8/11/17 for RIGHT UIQ [id number] Infiltrating ductal Cancer, margins 1.2 cm, Gr 2. LN 1/4, ER/DC positive and [id number] neg. Oncotype was 7. LEFT was pT1mic, N0, Margins neg at 5 mm. ER+/DC neg and Her2 neg. s/p L SLNBx due to microinvasion on final path 10/13/17. \par \par Reporting bilateral tenderness to arms with intermittent swelling at Bx site intermittently but not now. \par Taking Naproxen for relief of arm pain. Also diminished ROM with raised arms.  Is currently followed by Rheumatology () for arthralgia/ ? Lymes Disease. Denies swelling to fingers or signs of LE. Reports doing PT exercises at home daily. Declining referral to PT due to COVID concerns.\par \par Sees medical  oncologist: Wade Alejo, Started tamoxifen 10/24/17. Still cycling monthly, however this month has 2 cycles. Has f/u with Gyn at [place] (Dr. Marlene Corrigan) 7/20.\par Finished EBRT 2/18/18 Dr. Nogueira rad onc. Discharged plastic surgery Dr. Gatica.\par \par 6/5/20 ZP Magno SmmgT/US: priors to 2017, dense, magno post surgical changes, stable calcs with dystropic appearance, no susp findings noted magno. BR2\par 6/5/20 ZP Magno US: compared to 2019, R 1:00 N3 scar, 2;00 N3 (5mm) stable cyst; L 1:00 N9 (1.4cm), (0.5cm) and (1.3cm) seromas. BR2\par \par 12/16/19 ZP MRI, compared to 6/30/17, mmg 12/16/19, Magno scattered enhancing nonspecific foci, Magno post surgical changes, no abn findings; no lymphadenopathy, BR2.

## 2020-06-18 NOTE — REVIEW OF SYSTEMS
[Arthralgias] : arthralgias [Limb Pain] : limb pain [Negative] : Heme/Lymph [FreeTextEntry9] : Magno arm pain

## 2020-06-18 NOTE — ASSESSMENT
[FreeTextEntry1] : 43 y/o BRCA neg female here for f.u, had recent mmg/US results. \par S/p bilat BCS with R SLN, bilat breast reduction, oncoplastic surgery and biozorb insertion on 8/11/17 for RIGHT UIQ [id number] Infiltrating ductal Cancer, margins 1.2 cm, Gr 2. LN 1/4, ER/VA positive and [id number] neg. Oncotype was 7. LEFT was pT1mic, N0, Margins neg at 5 mm. ER+/VA neg and Her2 neg. s/p L SLNBx due to microinvasion on final path 10/13/17. \par \par Reporting bilateral tenderness to arms with intermittent swelling at Bx site intermittently but not now. \par Taking Naproxen for relief of arm pain. Also diminished ROM with raised arms.  Is currently followed by Rheumatology () for arthralgia/ ? Lymes Disease. Denies swelling to fingers or signs of LE. Reports doing PT exercises at home daily. Declining referral to PT due to COVID concerns.\par \par Sees medical  oncologist: Wade Alejo, Started tamoxifen 10/24/17. Still cycling monthly, however this month has 2 cycles. Has f/u with Gyn at [place] (Dr. Marlene Corrigan) 7/20.\par Finished EBRT 2/18/18 Dr. Nogueira rad onc. Discharged plastic surgery Dr. Gatica.\par \par 6/5/20 ZP Wilfred SmmgT/US: priors to 2017, dense, wilfred post surgical changes, stable calcs with dystropic appearance, no susp findings noted wilfred. BR2\par 6/5/20 ZP Wilfred US: compared to 2019, R 1:00 N3 scar, 2;00 N3 (5mm) stable cyst; L 1:00 N9 (1.4cm), (0.5cm) and (1.3cm) seromas. BR2\par \par 12/16/19 ZP MRI, compared to 6/30/17, mmg 12/16/19, Wilfred scattered enhancing nonspecific foci, Wilfred post surgical changes, no abn findings; no lymphadenopathy, BR2.\par CBE: Bilat reduction scars. UIQ biozorb slightly palpable. No adenopathy. CORIE\par Reviewed with pt results of recent mmg and u/s, all neg.  Pt seeing Dr. Charles, rheum, for spondlyoarthropathy.  She declines PT for the moment and will work on movements at home.  Her decreased ROM not related to her breast surgery.  On 12/19 CBE, pt had full ROM and was just starting to feel the stiffness. She is 3 years out. Can f.u 6 mos with NP.

## 2020-06-18 NOTE — PHYSICAL EXAM
[Normocephalic] : normocephalic [Atraumatic] : atraumatic [Supple] : supple [No Thyromegaly] : no thyromegaly [No Supraclavicular Adenopathy] : no supraclavicular adenopathy [Examined in the supine and seated position] : examined in the supine and seated position [Bra Size: ___] : Bra Size: [unfilled] [No dominant masses] : no dominant masses in right breast  [No dominant masses] : no dominant masses left breast [No Nipple Retraction] : no left nipple retraction [No Nipple Discharge] : no left nipple discharge [No Axillary Lymphadenopathy] : no left axillary lymphadenopathy [No Rashes] : no rashes [No Edema] : no edema [No Ulceration] : no ulceration [Breast Nipple Inversion] : nipples not inverted [Breast Nipple Fissures] : nipples not fissured [Breast Nipple Retraction] : nipples not retracted [Breast Nipple Flattening] : nipples not flattened [Breast Abnormal Secretion Bloody Fluid] : no bloody discharge [Breast Abnormal Secretion Serous Fluid] : no serous discharge [Breast Abnormal Lactation (Galactorrhea)] : no galactorrhea [de-identified] : Magno reduction scars [Breast Abnormal Secretion Opalescent Fluid] : no milky discharge

## 2020-07-23 ENCOUNTER — OUTPATIENT (OUTPATIENT)
Dept: OUTPATIENT SERVICES | Facility: HOSPITAL | Age: 45
LOS: 1 days | Discharge: HOME | End: 2020-07-23

## 2020-07-23 DIAGNOSIS — C50.919 MALIGNANT NEOPLASM OF UNSPECIFIED SITE OF UNSPECIFIED FEMALE BREAST: ICD-10-CM

## 2020-07-28 ENCOUNTER — APPOINTMENT (OUTPATIENT)
Dept: HEMATOLOGY ONCOLOGY | Facility: CLINIC | Age: 45
End: 2020-07-28
Payer: MEDICAID

## 2020-07-28 VITALS
OXYGEN SATURATION: 99 % | TEMPERATURE: 98.8 F | DIASTOLIC BLOOD PRESSURE: 79 MMHG | HEART RATE: 78 BPM | BODY MASS INDEX: 33.03 KG/M2 | SYSTOLIC BLOOD PRESSURE: 120 MMHG | RESPIRATION RATE: 16 BRPM | WEIGHT: 180.56 LBS

## 2020-07-28 PROCEDURE — 99214 OFFICE O/P EST MOD 30 MIN: CPT

## 2020-07-29 ENCOUNTER — RX RENEWAL (OUTPATIENT)
Age: 45
End: 2020-07-29

## 2020-07-29 NOTE — PHYSICAL EXAM
[Fully active, able to carry on all pre-disease performance without restriction] : Status 0 - Fully active, able to carry on all pre-disease performance without restriction [Normal] : affect appropriate [de-identified] : s/p B/L LE and redn mammoplasty with well healed scars, no masses; B/L ax neg

## 2020-07-29 NOTE — HISTORY OF PRESENT ILLNESS
[de-identified] : Ms. Flowers is a [place] female who on June 2, 2017 saw her gynecologist, Dr. Corrigan, as she had menses twice in the same month and subsequently felt tired. She was then referred for routine screening mammogram, which was performed on June 5, 2017 with the finding of a right breast mass with associated architectural distortion suspicious for malignancy with ultrasound-guided core biopsy recommended. The patient also consequently had a bilateral breast ultrasound on the same date with a finding of a hypoechoic mass with irregular margins and posterior shadowing at the 1 o'clock axis of the right breast corresponding to the findings on the mammogram with no further suspicious findings; ultrasound-guided core biopsy was recommended. She ultimately had an ultrasound-guided core biopsy performed on June 12, 2017 with a finding of invasive moderately differentiated duct carcinoma, with the largest focus of invasive carcinoma measuring up to 5 mm with evidence of DCIS, intermediate nuclear grade, solid and cribriform types, with no evidence of lymphovascular invasion, ER positive (77%), CT positive (83%), and HER-2/carmen (1+) and negative. The patient then went on to have a bilateral breast MRI with the right breast showing, within the upper inner quadrant, a spiculated enhancing mass corresponding to the biopsy-proven cancer measuring up to 2 cm in the mediolateral direction, and 1.7 cm in the anterior-posterior direction, and 1.5 cm in the craniocaudal direction; left breast showed, within the upper outer quadrant, a somewhat serpiginous area of enhancement with the more anterior aspect becoming more nodular and measuring up to 1.1 cm in size, with additional scattered nonspecific enhancing foci; the axillae were unremarkable. 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 The patient subsequently was seen with complaint of pain and burning sensation and a new "lump" at the left breast biopsy site with a comment from the radiologist of a small hematoma with surrounding ecchymosis present on July 22, 2017, with instructions for warm compresses and pressure. The patient ultimately saw Dr. Caroline Benítez and underwent a bilateral lumpectomy and reduction mammoplasty, which was performed on August 11, 2017, at [facility] with a finding in the right breast of an invasive ductal carcinoma, moderately differentiated, measuring 2 cm in greatest diameter, nuclear, [place] score 7/9, with evidence of DCIS and LCIS present in addition to fibrocystic changes with margins negative for carcinoma, with 0/3 sentinel lymph nodes involved with carcinoma; the left breast excisional biopsy showed evidence of ductal carcinoma in situ with microinvasion, with DCIS being of high nuclear grade, solid and comedo type with central necrosis, evidence of lobular carcinoma in situ, with margins of resection negative for DCIS and microinvasion. The patient returned to the operating room on October 13, 2017 for a left sentinel lymph node biopsy with a finding of 0/3 left sentinel lymph nodes involved with carcinoma. She subsequently had Oncotype DX testing sent off on her right breast carcinoma with a finding of a recurrence score of 7, correlating to an 8% risk of distant recurrence within 10 years should she take tamoxifen alone. The patient saw a medical oncologist, Dr. Maldonado, at [facility] who recommended adjuvant chemotherapy with CMF. She also had BRCA testing sent off, specifically a BRCA 1/2 [employer] 3-site mutation panel, which returned negative of note.\par \par She saw me in initial consultation in 11/17 and I recommended that she proceed with further genetic predisposition testing which returned negative for other known genetic predisposition mutations. Started Tamoxifen on October 24th 2017. She completed RT at [facility] () on 2/13/18. \par \par Disease: breast cancer  \par Pathology: right breast IDC; left breast DCIS \par TNM stage: T1, N0, M0 \par AJCC Stage: 1 \par \par In the past she had c/o increasing arthralgias and joint swelling. She followed up with her PCP and was found to have Lyme IgG Ab P23 s/p doxycycline 100mg BID x 21 days in the beginning of June 2019.  She was prescribed another 21 days but refused to take it due to side effects.  Since then, she reported improvement in her forgetfulness, lightheadedness, fatigue, hair thinning.  Fevers and night sweats resolved. Thyroid workup neg, mildly elevated TSH.  \par \par She was last seen in 7/19. In the interim she c/o gradually worsening arthralgias and body aches especially since 11/19. She saw a new PCP - Dr Grimm who then referred her for an ID consult, Dr Kaba, and had no serologic evidence of Lyme Dz, or other active infections. She then had a rheum consult with Dr. Charles, who assessed:\par \par "44 year old female presents with polyarticular joint pains and myalgias of unclear etiology. Some of her symptoms, including the distribution of joints (MCP's and PIP's), joint swelling, elevated CRP, and family history, are suggestive of rheumatoid arthritis, though other symptoms, including pain outside of the joints and lack of prolonged AM stiffness, are atypical. I have therefore ordered some more bloodwork and x-rays as further workup. She will follow up with me again in 2 weeks to review her results. In the meantime, I have also started her on a trial of low-dose prednisone."\par \par Pt reports prednisone did not lead to any improvement so she d/c'd it.  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 DDx also includes AE's from tamoxifen, though less likely.\par  - Rx naproxen 500mg BID.\par  - x-rays of L-spine, S-I joints.\par \par 44 year old female with polyarticular joint pains and low back pain and found to have (+)HLA-B27. 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 DDx also includes AE's from tamoxifen, though less likely.\par  - Rx naproxen 500mg BID.\par  - x-rays of L-spine, S-I joints.\par  \par  [de-identified] : Seen today for a f/u visit.. \par \par She c/o persistent intermittent joint swelling, arthralgias, low back pain and generalized fatigue. Dr. Charles prescribed sulfasalazine and Lyrica. He ordered radiologic studies which have not been done yet.\par \par Pt c/o increased weight over time. She otherwise denies all other new complaints. \par \par Menses remain regular. \par   \par 6/20 mammo/sono \par 12/19 MRI neg / benign findings.

## 2020-07-29 NOTE — REVIEW OF SYSTEMS
[Negative] : Psychiatric [Chills] : no chills [Fever] : no fever [Recent Change In Weight] : ~T no recent weight change [Night Sweats] : no night sweats [Vomiting] : no vomiting [Abdominal Pain] : no abdominal pain [Constipation] : no constipation [Diarrhea] : no diarrhea [FreeTextEntry7] : as above [FreeTextEntry2] : as above [FreeTextEntry9] : as above

## 2020-08-10 ENCOUNTER — LABORATORY RESULT (OUTPATIENT)
Age: 45
End: 2020-08-10

## 2020-08-10 ENCOUNTER — RX RENEWAL (OUTPATIENT)
Age: 45
End: 2020-08-10

## 2020-08-10 ENCOUNTER — APPOINTMENT (OUTPATIENT)
Dept: FAMILY MEDICINE | Facility: CLINIC | Age: 45
End: 2020-08-10
Payer: MEDICAID

## 2020-08-10 VITALS
OXYGEN SATURATION: 99 % | WEIGHT: 175 LBS | DIASTOLIC BLOOD PRESSURE: 80 MMHG | TEMPERATURE: 98.2 F | HEART RATE: 83 BPM | HEIGHT: 62 IN | SYSTOLIC BLOOD PRESSURE: 100 MMHG | BODY MASS INDEX: 32.2 KG/M2

## 2020-08-10 LAB
ALBUMIN SERPL ELPH-MCNC: 4 G/DL
ALP BLD-CCNC: 50 U/L
ALT SERPL-CCNC: 8 U/L
ANION GAP SERPL CALC-SCNC: 9 MMOL/L
APPEARANCE: ABNORMAL
AST SERPL-CCNC: 15 U/L
BASOPHILS # BLD AUTO: 0.03 K/UL
BASOPHILS NFR BLD AUTO: 0.7 %
BILIRUB SERPL-MCNC: 0.2 MG/DL
BILIRUBIN URINE: NEGATIVE
BLOOD URINE: NEGATIVE
BUN SERPL-MCNC: 14 MG/DL
CALCIUM SERPL-MCNC: 8.8 MG/DL
CHLORIDE SERPL-SCNC: 107 MMOL/L
CHOLEST SERPL-MCNC: 179 MG/DL
CHOLEST/HDLC SERPL: 2.2 RATIO
CO2 SERPL-SCNC: 26 MMOL/L
COLOR: YELLOW
CREAT SERPL-MCNC: 0.68 MG/DL
EOSINOPHIL # BLD AUTO: 0.07 K/UL
EOSINOPHIL NFR BLD AUTO: 1.7 %
ESTIMATED AVERAGE GLUCOSE: 111 MG/DL
GLUCOSE QUALITATIVE U: NEGATIVE
GLUCOSE SERPL-MCNC: 108 MG/DL
HBA1C MFR BLD HPLC: 5.5 %
HCT VFR BLD CALC: 35.1 %
HDLC SERPL-MCNC: 82 MG/DL
HGB BLD-MCNC: 10.8 G/DL
IMM GRANULOCYTES NFR BLD AUTO: 0.2 %
KETONES URINE: NEGATIVE
LDLC SERPL CALC-MCNC: 89 MG/DL
LEUKOCYTE ESTERASE URINE: ABNORMAL
LYMPHOCYTES # BLD AUTO: 1.06 K/UL
LYMPHOCYTES NFR BLD AUTO: 26.2 %
MAN DIFF?: NORMAL
MCHC RBC-ENTMCNC: 30.8 GM/DL
MCHC RBC-ENTMCNC: 31.6 PG
MCV RBC AUTO: 102.6 FL
MONOCYTES # BLD AUTO: 0.26 K/UL
MONOCYTES NFR BLD AUTO: 6.4 %
NEUTROPHILS # BLD AUTO: 2.62 K/UL
NEUTROPHILS NFR BLD AUTO: 64.8 %
NITRITE URINE: NEGATIVE
PH URINE: 6
PLATELET # BLD AUTO: 203 K/UL
POTASSIUM SERPL-SCNC: 4.5 MMOL/L
PROT SERPL-MCNC: 6.3 G/DL
PROTEIN URINE: NORMAL
RBC # BLD: 3.42 M/UL
RBC # FLD: 12.1 %
SODIUM SERPL-SCNC: 142 MMOL/L
SPECIFIC GRAVITY URINE: 1.02
TRIGL SERPL-MCNC: 45 MG/DL
TSH SERPL-ACNC: 1.21 UIU/ML
UROBILINOGEN URINE: NORMAL
WBC # FLD AUTO: 4.05 K/UL

## 2020-08-10 PROCEDURE — 36415 COLL VENOUS BLD VENIPUNCTURE: CPT

## 2020-08-10 PROCEDURE — 99396 PREV VISIT EST AGE 40-64: CPT | Mod: 25

## 2020-08-10 NOTE — REVIEW OF SYSTEMS
[Fatigue] : fatigue [Joint Pain] : joint pain [Muscle Pain] : muscle pain [Negative] : Heme/Lymph [FreeTextEntry9] : chronic

## 2020-08-10 NOTE — ASSESSMENT
[FreeTextEntry1] : Patient was counseled on healthy eating habits, on daily exercise and stress relief. All medications and allergies were reviewed with the patient and any adjustments necessary were made. Patient was counseled to try engage in an exercise activity for at least 30 minutes 3-4 times a week.  Patient was advised to eat a diet low in fat and carbohydrates and high in protein, with plenty of vegetables. Patient was advised to try and engage in relaxing activities whenever possible.\par The patients blood was draw in office and will be followed and assessed for any issues.  Patient was told to return to the office if any issues arise.  Unless otherwise stated, the patient is to continue all other medications as previously prescribed.\par \par post lyme syndrome\par patient was treated for 2 months of doxy\par still symptomatic - refer to rheum and id\par seeing rheum, on meds, not feeling much better\par \par breast cancer\par resolved, on tamoxifen, follows with breast surgeon and onc\par \par

## 2020-08-10 NOTE — HEALTH RISK ASSESSMENT
[Very Good] : ~his/her~  mood as very good [] : No [Yes] : Yes [No falls in past year] : Patient reported no falls in the past year [0] : 2) Feeling down, depressed, or hopeless: Not at all (0) [[id number]] : 0 [Patient reported mammogram was normal] : Patient reported mammogram was normal [Employed] : employed [Student] : student [] :  [# Of Children ___] : has [unfilled] children [Fully functional (bathing, dressing, toileting, transferring, walking, feeding)] : Fully functional (bathing, dressing, toileting, transferring, walking, feeding) [Fully functional (using the telephone, shopping, preparing meals, housekeeping, doing laundry, using] : Fully functional and needs no help or supervision to perform IADLs (using the telephone, shopping, preparing meals, housekeeping, doing laundry, using transportation, managing medications and managing finances) [MammogramDate] : 2019 [MammogramComments] : s/p breast cancer [de-identified] : works part time and goes to school for us [FreeTextEntry3] : getting

## 2020-08-10 NOTE — HISTORY OF PRESENT ILLNESS
[de-identified] : 44 year old female  here for annual well visit. Patient's blood work was drawn and medications reviewed. Patient's past medical history was reviewed, allergies verified and problems were identified and assessed. Patients medications were reviewed. Patient is feeling well with no new or active complaints at this time.\par \par

## 2020-08-12 ENCOUNTER — APPOINTMENT (OUTPATIENT)
Dept: RHEUMATOLOGY | Facility: CLINIC | Age: 45
End: 2020-08-12
Payer: MEDICAID

## 2020-08-12 VITALS — DIASTOLIC BLOOD PRESSURE: 79 MMHG | HEART RATE: 81 BPM | SYSTOLIC BLOOD PRESSURE: 121 MMHG

## 2020-08-12 LAB
FERRITIN SERPL-MCNC: 37 NG/ML
FOLATE SERPL-MCNC: 12.3 NG/ML
IRON SATN MFR SERPL: 20 %
IRON SERPL-MCNC: 74 UG/DL
TIBC SERPL-MCNC: 369 UG/DL
UIBC SERPL-MCNC: 295 UG/DL
VIT B12 SERPL-MCNC: 575 PG/ML

## 2020-08-12 PROCEDURE — G0009: CPT

## 2020-08-12 PROCEDURE — 90670 PCV13 VACCINE IM: CPT

## 2020-08-12 PROCEDURE — 99214 OFFICE O/P EST MOD 30 MIN: CPT | Mod: 25

## 2020-08-12 NOTE — PHYSICAL EXAM
[General Appearance - Alert] : alert [General Appearance - In No Acute Distress] : in no acute distress [Sclera] : the sclera and conjunctiva were normal [Outer Ear] : the ears and nose were normal in appearance [Oropharynx] : the oropharynx was normal [Neck Appearance] : the appearance of the neck was normal [Neck Cervical Mass (___cm)] : no neck mass was observed [Jugular Venous Distention Increased] : there was no jugular-venous distention [Thyroid Diffuse Enlargement] : the thyroid was not enlarged [Thyroid Nodule] : there were no palpable thyroid nodules [Heart Rate And Rhythm] : heart rate was normal and rhythm regular [Auscultation Breath Sounds / Voice Sounds] : lungs were clear to auscultation bilaterally [Heart Sounds Gallop] : no gallops [Heart Sounds] : normal S1 and S2 [Murmurs] : no murmurs [Heart Sounds Pericardial Friction Rub] : no pericardial rub [Edema] : there was no peripheral edema [Bowel Sounds] : normal bowel sounds [Abdomen Soft] : soft [Abdomen Tenderness] : non-tender [Abdomen Mass (___ Cm)] : no abdominal mass palpated [Cervical Lymph Nodes Enlarged Posterior Bilaterally] : posterior cervical [Cervical Lymph Nodes Enlarged Anterior Bilaterally] : anterior cervical [Supraclavicular Lymph Nodes Enlarged Bilaterally] : supraclavicular [No Spinal Tenderness] : no spinal tenderness [Skin Color & Pigmentation] : normal skin color and pigmentation [] : no rash [Skin Turgor] : normal skin turgor [Oriented To Time, Place, And Person] : oriented to person, place, and time [No Focal Deficits] : no focal deficits [Impaired Insight] : insight and judgment were intact [Affect] : the affect was normal [FreeTextEntry1] : no visible joint swelling;  decreased flexion of hand joints, though somewhat improved from previous visit;  normal ROM in rest of joints

## 2020-08-12 NOTE — ASSESSMENT
[FreeTextEntry1] : 44 year old female with polyarticular joint pains and low back pain and found to have (+)HLA-B27.  Presentation suggestive of undifferentiated spondyloarthropathy vs seronegative RA.  MRI L-spine reveals degenerative changes / no evidence of spondylosis.  DDx also includes AE's from tamoxifen, though less likely.  Symptoms appear to be improving on sulfasalazine (occurring more intermittently).\par   - Continue SSZ 1000mg BID.  (avoiding biologics, given hx of breast CA)\par   - Administered Prevnar to RUE.\par   - Cont naproxen 500mg BID prn for now.\par   - Recommended back exercises.\par   - warm compresses\par   - OTC topical analgesics.\par   - Quantiferon negative 1/20.

## 2020-08-12 NOTE — HISTORY OF PRESENT ILLNESS
[Fatigue] : fatigue [Arthralgias] : arthralgias [Myalgias] : myalgias [FreeTextEntry1] : Still w/ joint pains, wesly in her hands and lower back, though has been occurring  more intermittently lately.  She also reports that the SSZ has been "bothering her stomach a little", though tolerable.  She also tried taking Lyrica, but developed nausea/vomiting, so she stopped taking it.  No new complaints. [Anorexia] : no anorexia [Malaise] : no malaise [Weight Loss] : no weight loss [Fever] : no fever [Chills] : no chills [Malar Facial Rash] : no malar facial rash [Skin Lesions] : no lesions [Skin Nodules] : no skin nodules [Oral Ulcers] : no oral ulcers [Cough] : no cough [Dysphonia] : no dysphonia [Dry Mouth] : no dry mouth [Dysphagia] : no dysphagia [Shortness of Breath] : no shortness of breath [Chest Pain] : no chest pain [Joint Swelling] : no joint swelling [Joint Warmth] : no joint warmth [Joint Deformity] : no joint deformity [Decreased ROM] : no decreased range of motion [Morning Stiffness] : no morning stiffness [Falls] : no falls [Difficulty Standing] : no difficulty standing [Difficulty Walking] : no difficulty walking [Dyspnea] : no dyspnea [Muscle Spasms] : no muscle spasms [Muscle Weakness] : no muscle weakness [Muscle Cramping] : no muscle cramping [Eye Redness] : no eye redness [Visual Changes] : no visual changes [Eye Pain] : no eye pain [Dry Eyes] : no dry eyes

## 2020-10-06 PROBLEM — Z87.898 HISTORY OF FEVER: Status: RESOLVED | Noted: 2020-02-26 | Resolved: 2020-10-06

## 2020-10-06 PROBLEM — Z87.898 HISTORY OF DYSURIA: Status: RESOLVED | Noted: 2018-07-17 | Resolved: 2020-10-06

## 2020-10-07 ENCOUNTER — APPOINTMENT (OUTPATIENT)
Dept: OBGYN | Facility: CLINIC | Age: 45
End: 2020-10-07
Payer: MEDICAID

## 2020-10-07 VITALS
SYSTOLIC BLOOD PRESSURE: 120 MMHG | OXYGEN SATURATION: 98 % | DIASTOLIC BLOOD PRESSURE: 70 MMHG | TEMPERATURE: 97.4 F | BODY MASS INDEX: 32.76 KG/M2 | WEIGHT: 178 LBS | HEART RATE: 74 BPM | HEIGHT: 62 IN

## 2020-10-07 DIAGNOSIS — Z80.0 FAMILY HISTORY OF MALIGNANT NEOPLASM OF DIGESTIVE ORGANS: ICD-10-CM

## 2020-10-07 DIAGNOSIS — Z82.49 FAMILY HISTORY OF ISCHEMIC HEART DISEASE AND OTHER DISEASES OF THE CIRCULATORY SYSTEM: ICD-10-CM

## 2020-10-07 DIAGNOSIS — Z87.898 PERSONAL HISTORY OF OTHER SPECIFIED CONDITIONS: ICD-10-CM

## 2020-10-07 DIAGNOSIS — R10.2 PELVIC AND PERINEAL PAIN: ICD-10-CM

## 2020-10-07 DIAGNOSIS — Z87.442 PERSONAL HISTORY OF URINARY CALCULI: ICD-10-CM

## 2020-10-07 PROCEDURE — 99386 PREV VISIT NEW AGE 40-64: CPT

## 2020-10-07 RX ORDER — PREGABALIN 50 MG/1
50 CAPSULE ORAL TWICE DAILY
Qty: 60 | Refills: 2 | Status: DISCONTINUED | COMMUNITY
Start: 2020-07-10 | End: 2020-10-07

## 2020-10-07 NOTE — PHYSICAL EXAM
[Appropriately responsive] : appropriately responsive [Alert] : alert [No Acute Distress] : no acute distress [No Lymphadenopathy] : no lymphadenopathy [Soft] : soft [Non-tender] : non-tender [Non-distended] : non-distended [No HSM] : No HSM [No Lesions] : no lesions [No Mass] : no mass [Oriented x3] : oriented x3 [Examination Of The Breasts] : a normal appearance [No Masses] : no breast masses were palpable [Labia Majora] : normal [Labia Minora] : normal [Normal] : normal [Uterine Adnexae] : normal [IUD String] : an IUD string was noted

## 2020-10-08 LAB — HPV HIGH+LOW RISK DNA PNL CVX: NOT DETECTED

## 2020-10-13 LAB — CYTOLOGY CVX/VAG DOC THIN PREP: ABNORMAL

## 2020-10-16 ENCOUNTER — APPOINTMENT (OUTPATIENT)
Dept: RHEUMATOLOGY | Facility: CLINIC | Age: 45
End: 2020-10-16
Payer: MEDICAID

## 2020-10-16 VITALS — WEIGHT: 171 LBS | BODY MASS INDEX: 31.28 KG/M2

## 2020-10-16 VITALS — DIASTOLIC BLOOD PRESSURE: 81 MMHG | HEART RATE: 87 BPM | SYSTOLIC BLOOD PRESSURE: 122 MMHG

## 2020-10-16 PROCEDURE — 99215 OFFICE O/P EST HI 40 MIN: CPT | Mod: 25

## 2020-10-16 NOTE — PHYSICAL EXAM
[General Appearance - In No Acute Distress] : in no acute distress [General Appearance - Alert] : alert [Sclera] : the sclera and conjunctiva were normal [Outer Ear] : the ears and nose were normal in appearance [Neck Appearance] : the appearance of the neck was normal [Oropharynx] : the oropharynx was normal [Neck Cervical Mass (___cm)] : no neck mass was observed [Jugular Venous Distention Increased] : there was no jugular-venous distention [Thyroid Diffuse Enlargement] : the thyroid was not enlarged [Thyroid Nodule] : there were no palpable thyroid nodules [Auscultation Breath Sounds / Voice Sounds] : lungs were clear to auscultation bilaterally [Heart Rate And Rhythm] : heart rate was normal and rhythm regular [Heart Sounds] : normal S1 and S2 [Heart Sounds Gallop] : no gallops [Murmurs] : no murmurs [Heart Sounds Pericardial Friction Rub] : no pericardial rub [Edema] : there was no peripheral edema [Abdomen Soft] : soft [Bowel Sounds] : normal bowel sounds [Abdomen Tenderness] : non-tender [Abdomen Mass (___ Cm)] : no abdominal mass palpated [Cervical Lymph Nodes Enlarged Anterior Bilaterally] : anterior cervical [Cervical Lymph Nodes Enlarged Posterior Bilaterally] : posterior cervical [No Spinal Tenderness] : no spinal tenderness [Supraclavicular Lymph Nodes Enlarged Bilaterally] : supraclavicular [Skin Color & Pigmentation] : normal skin color and pigmentation [Skin Turgor] : normal skin turgor [] : no rash [No Focal Deficits] : no focal deficits [Oriented To Time, Place, And Person] : oriented to person, place, and time [Impaired Insight] : insight and judgment were intact [Affect] : the affect was normal [FreeTextEntry1] : no visible joint swelling;  decreased flexion of hand joints, though somewhat improved from previous visit;  normal ROM in rest of joints

## 2020-10-16 NOTE — HISTORY OF PRESENT ILLNESS
[Fatigue] : fatigue [Arthralgias] : arthralgias [Myalgias] : myalgias [FreeTextEntry1] : Joint pains have been fluctuating - had been better overall, though w/ worse pain over the past several days, wesly in all of the joints in her LUE.  Still w/ AM stiffness x 30 minutes.   [Weight Loss] : no weight loss [Anorexia] : no anorexia [Fever] : no fever [Malaise] : no malaise [Chills] : no chills [Malar Facial Rash] : no malar facial rash [Skin Lesions] : no lesions [Skin Nodules] : no skin nodules [Oral Ulcers] : no oral ulcers [Cough] : no cough [Dry Mouth] : no dry mouth [Dysphonia] : no dysphonia [Dysphagia] : no dysphagia [Shortness of Breath] : no shortness of breath [Chest Pain] : no chest pain [Joint Warmth] : no joint warmth [Joint Swelling] : no joint swelling [Joint Deformity] : no joint deformity [Decreased ROM] : no decreased range of motion [Morning Stiffness] : no morning stiffness [Difficulty Standing] : no difficulty standing [Falls] : no falls [Difficulty Walking] : no difficulty walking [Dyspnea] : no dyspnea [Muscle Weakness] : no muscle weakness [Muscle Cramping] : no muscle cramping [Muscle Spasms] : no muscle spasms [Eye Pain] : no eye pain [Visual Changes] : no visual changes [Eye Redness] : no eye redness [Dry Eyes] : no dry eyes

## 2020-10-16 NOTE — ASSESSMENT
[FreeTextEntry1] : 44 year old female with polyarticular joint pains and low back pain and found to have (+)HLA-B27.  Presentation suggestive of undifferentiated spondyloarthropathy vs seronegative RA.  MRI L-spine reveals degenerative changes / no evidence of spondylitis.  DDx also includes AE's from tamoxifen, though less likely.  Symptoms had been improving on sulfasalazine, though now worsening again.\par   - Continue SSZ 1000mg BID.  (avoiding biologics, given hx of breast CA)\par   - Will plan to discuss w/ heme/onc re: starting MTX.\par   - Flu vaccine (9/20, per pt), Prevnar (8/20) UTD.\par   - Cont naproxen 500mg BID prn for now.\par   - Recommended back exercises.\par   - warm compresses\par   - OTC topical analgesics.\par   - Quantiferon negative 1/20.

## 2020-10-19 LAB
ALBUMIN SERPL ELPH-MCNC: 4.7 G/DL
ALP BLD-CCNC: 66 U/L
ALT SERPL-CCNC: 12 U/L
ANION GAP SERPL CALC-SCNC: 12 MMOL/L
AST SERPL-CCNC: 17 U/L
BASOPHILS # BLD AUTO: 0.03 K/UL
BASOPHILS NFR BLD AUTO: 0.6 %
BILIRUB SERPL-MCNC: 0.2 MG/DL
BUN SERPL-MCNC: 14 MG/DL
CALCIUM SERPL-MCNC: 9.6 MG/DL
CHLORIDE SERPL-SCNC: 103 MMOL/L
CO2 SERPL-SCNC: 25 MMOL/L
CREAT SERPL-MCNC: 0.65 MG/DL
CRP SERPL-MCNC: 1.07 MG/DL
EOSINOPHIL # BLD AUTO: 0.03 K/UL
EOSINOPHIL NFR BLD AUTO: 0.6 %
ERYTHROCYTE [SEDIMENTATION RATE] IN BLOOD BY WESTERGREN METHOD: 33 MM/HR
GLUCOSE SERPL-MCNC: 107 MG/DL
HCT VFR BLD CALC: 38.9 %
HGB BLD-MCNC: 12.1 G/DL
IMM GRANULOCYTES NFR BLD AUTO: 0.2 %
LYMPHOCYTES # BLD AUTO: 1.29 K/UL
LYMPHOCYTES NFR BLD AUTO: 24.4 %
MAN DIFF?: NORMAL
MCHC RBC-ENTMCNC: 31.1 GM/DL
MCHC RBC-ENTMCNC: 32.2 PG
MCV RBC AUTO: 103.5 FL
MONOCYTES # BLD AUTO: 0.34 K/UL
MONOCYTES NFR BLD AUTO: 6.4 %
NEUTROPHILS # BLD AUTO: 3.59 K/UL
NEUTROPHILS NFR BLD AUTO: 67.8 %
PLATELET # BLD AUTO: 249 K/UL
POTASSIUM SERPL-SCNC: 5.2 MMOL/L
PROT SERPL-MCNC: 7.7 G/DL
RBC # BLD: 3.76 M/UL
RBC # FLD: 12.3 %
SODIUM SERPL-SCNC: 140 MMOL/L
WBC # FLD AUTO: 5.29 K/UL

## 2020-10-27 ENCOUNTER — TRANSCRIPTION ENCOUNTER (OUTPATIENT)
Age: 45
End: 2020-10-27

## 2020-11-16 ENCOUNTER — APPOINTMENT (OUTPATIENT)
Dept: RHEUMATOLOGY | Facility: CLINIC | Age: 45
End: 2020-11-16
Payer: MEDICAID

## 2020-11-16 VITALS — DIASTOLIC BLOOD PRESSURE: 81 MMHG | HEART RATE: 85 BPM | SYSTOLIC BLOOD PRESSURE: 121 MMHG

## 2020-11-16 PROCEDURE — 99215 OFFICE O/P EST HI 40 MIN: CPT

## 2020-11-16 PROCEDURE — 99072 ADDL SUPL MATRL&STAF TM PHE: CPT

## 2020-11-16 NOTE — HISTORY OF PRESENT ILLNESS
[Fatigue] : fatigue [Arthralgias] : arthralgias [Myalgias] : myalgias [FreeTextEntry1] : Pt c/o pain in her B/L knees (L>R) and B/L shoulders (R>L) x past 2 weeks.  Also still w/ LBP.   Still w/ AM stiffness x 30 minutes.   [Anorexia] : no anorexia [Weight Loss] : no weight loss [Malaise] : no malaise [Fever] : no fever [Chills] : no chills [Malar Facial Rash] : no malar facial rash [Skin Lesions] : no lesions [Skin Nodules] : no skin nodules [Oral Ulcers] : no oral ulcers [Cough] : no cough [Dry Mouth] : no dry mouth [Dysphonia] : no dysphonia [Dysphagia] : no dysphagia [Shortness of Breath] : no shortness of breath [Chest Pain] : no chest pain [Joint Swelling] : no joint swelling [Joint Warmth] : no joint warmth [Joint Deformity] : no joint deformity [Decreased ROM] : no decreased range of motion [Morning Stiffness] : no morning stiffness [Falls] : no falls [Difficulty Standing] : no difficulty standing [Difficulty Walking] : no difficulty walking [Dyspnea] : no dyspnea [Muscle Weakness] : no muscle weakness [Muscle Spasms] : no muscle spasms [Muscle Cramping] : no muscle cramping [Visual Changes] : no visual changes [Eye Pain] : no eye pain [Eye Redness] : no eye redness [Dry Eyes] : no dry eyes

## 2020-11-16 NOTE — PHYSICAL EXAM
[General Appearance - Alert] : alert [General Appearance - In No Acute Distress] : in no acute distress [Sclera] : the sclera and conjunctiva were normal [Outer Ear] : the ears and nose were normal in appearance [Oropharynx] : the oropharynx was normal [Neck Appearance] : the appearance of the neck was normal [Neck Cervical Mass (___cm)] : no neck mass was observed [Jugular Venous Distention Increased] : there was no jugular-venous distention [Thyroid Diffuse Enlargement] : the thyroid was not enlarged [Thyroid Nodule] : there were no palpable thyroid nodules [Auscultation Breath Sounds / Voice Sounds] : lungs were clear to auscultation bilaterally [Heart Rate And Rhythm] : heart rate was normal and rhythm regular [Heart Sounds] : normal S1 and S2 [Heart Sounds Gallop] : no gallops [Murmurs] : no murmurs [Heart Sounds Pericardial Friction Rub] : no pericardial rub [Edema] : there was no peripheral edema [Bowel Sounds] : normal bowel sounds [Abdomen Soft] : soft [Abdomen Tenderness] : non-tender [Abdomen Mass (___ Cm)] : no abdominal mass palpated [Cervical Lymph Nodes Enlarged Posterior Bilaterally] : posterior cervical [Cervical Lymph Nodes Enlarged Anterior Bilaterally] : anterior cervical [Supraclavicular Lymph Nodes Enlarged Bilaterally] : supraclavicular [No Spinal Tenderness] : no spinal tenderness [Skin Color & Pigmentation] : normal skin color and pigmentation [Skin Turgor] : normal skin turgor [] : no rash [No Focal Deficits] : no focal deficits [Oriented To Time, Place, And Person] : oriented to person, place, and time [Impaired Insight] : insight and judgment were intact [Affect] : the affect was normal [FreeTextEntry1] : no synovitis;  (+)tenderness in B/L 2nd-5th PIP's, right 1st and 5th MCP's;  B/L shoulders w/ pain upon abduction and internal rotation (R>L);  B/L knees w/ pain upon flexion/extension;  normal ROM in rest of joints

## 2020-11-16 NOTE — ASSESSMENT
[FreeTextEntry1] : 44 year old female with polyarticular joint pains and low back pain and found to have (+)HLA-B27.  Presentation suggestive of undifferentiated spondyloarthropathy vs seronegative RA.  MRI L-spine reveals degenerative changes / no evidence of spondylitis.  DDx also includes AE's from tamoxifen, though less likely.  Symptoms had been improving on sulfasalazine, though appears to no longer  be responding.\par   - D/C SSZ. \par   - Will plan to discuss w/ heme/onc re: starting MTX.   (avoiding biologics, given hx of breast CA)\par   - Hepatitis panel negative 6/20.\par   - Flu vaccine (9/20, per pt), Prevnar (8/20) UTD.\par   - Cont naproxen 500mg BID prn for now.\par   - Reiterated importance of back exercises.\par   - warm compresses\par   - OTC topical analgesics.\par   - Quantiferon negative 1/20.

## 2020-12-08 ENCOUNTER — APPOINTMENT (OUTPATIENT)
Dept: BREAST CENTER | Facility: CLINIC | Age: 45
End: 2020-12-08
Payer: MEDICAID

## 2020-12-08 VITALS
WEIGHT: 181.7 LBS | SYSTOLIC BLOOD PRESSURE: 118 MMHG | DIASTOLIC BLOOD PRESSURE: 70 MMHG | HEIGHT: 62 IN | BODY MASS INDEX: 33.44 KG/M2 | HEART RATE: 72 BPM | RESPIRATION RATE: 14 BRPM | TEMPERATURE: 97.3 F | OXYGEN SATURATION: 99 %

## 2020-12-08 DIAGNOSIS — M25.60 STIFFNESS OF UNSPECIFIED JOINT, NOT ELSEWHERE CLASSIFIED: ICD-10-CM

## 2020-12-08 DIAGNOSIS — Z13.71 ENCOUNTER FOR NONPROCREATIVE SCREENING FOR GENETIC DISEASE CARRIER STATUS: ICD-10-CM

## 2020-12-08 PROCEDURE — 99214 OFFICE O/P EST MOD 30 MIN: CPT

## 2020-12-08 PROCEDURE — 99072 ADDL SUPL MATRL&STAF TM PHE: CPT

## 2020-12-08 RX ORDER — SULFASALAZINE 500 MG/1
500 TABLET, DELAYED RELEASE ORAL
Qty: 120 | Refills: 3 | Status: DISCONTINUED | COMMUNITY
Start: 2020-07-10 | End: 2020-12-08

## 2020-12-08 NOTE — REVIEW OF SYSTEMS
[As Noted in HPI] : as noted in HPI [Arthralgias] : arthralgias [Joint Pain] : joint pain [Joint Swelling] : joint swelling [Joint Stiffness] : joint stiffness [Limb Swelling] : limb swelling [Negative] : Heme/Lymph

## 2020-12-14 ENCOUNTER — APPOINTMENT (OUTPATIENT)
Dept: RHEUMATOLOGY | Facility: CLINIC | Age: 45
End: 2020-12-14
Payer: MEDICAID

## 2020-12-14 ENCOUNTER — APPOINTMENT (OUTPATIENT)
Dept: FAMILY MEDICINE | Facility: CLINIC | Age: 45
End: 2020-12-14
Payer: MEDICAID

## 2020-12-14 VITALS
WEIGHT: 170 LBS | BODY MASS INDEX: 31.28 KG/M2 | RESPIRATION RATE: 14 BRPM | OXYGEN SATURATION: 100 % | SYSTOLIC BLOOD PRESSURE: 109 MMHG | HEART RATE: 84 BPM | DIASTOLIC BLOOD PRESSURE: 74 MMHG | HEIGHT: 62 IN | TEMPERATURE: 98.5 F

## 2020-12-14 PROCEDURE — 99215 OFFICE O/P EST HI 40 MIN: CPT | Mod: GC

## 2020-12-14 PROCEDURE — 99072 ADDL SUPL MATRL&STAF TM PHE: CPT

## 2020-12-14 PROCEDURE — 99213 OFFICE O/P EST LOW 20 MIN: CPT | Mod: 95

## 2020-12-14 NOTE — HISTORY OF PRESENT ILLNESS
[Home] : at home, [unfilled] , at the time of the visit. [Medical Office: ([facility])___] : at the medical office located in  [Verbal consent obtained from patient] : the patient, [unfilled] [FreeTextEntry1] : Here for eval of skin irritation.  [de-identified] : Here for eval of skin irritation. \par Taking Sulfasalazine 2 pills BID from rheum. \par Naproxen 500mg. \par Vitamin D3 5,000IU \par \par Reporting Sensitive skin.  \par Had laser hair removal in groin area/vaginal area. Tried OTC bikini itch cream-Colloidal. \par 3 weeks of itching and skin irritation in area.  Has not been able to follow-up with performing provider. \par Has been wearing breathable fabrics. \par \par Medications and allergies reviewed.\par Denies any other symptoms. \par \par \par

## 2020-12-14 NOTE — REVIEW OF SYSTEMS
[Itching] : Itching [Negative] : Gastrointestinal [Fever] : no fever [Vaginal Discharge] : no vaginal discharge

## 2020-12-14 NOTE — HISTORY OF PRESENT ILLNESS
[___ Month(s) Ago] : [unfilled] month(s) ago [Fatigue] : fatigue [Arthralgias] : arthralgias [Myalgias] : myalgias [FreeTextEntry1] : Today the patient reports diffuse body aches, in upper and lower body, over articular and non articular areas. the pain wax/wane but she experience symptoms daily. no other new medical problems since last visit. will have a follow up MRI in the next few weeks for the breast CA monitoring. [Anorexia] : no anorexia [Weight Loss] : no weight loss [Malaise] : no malaise [Fever] : no fever [Chills] : no chills [Malar Facial Rash] : no malar facial rash [Skin Lesions] : no lesions [Skin Nodules] : no skin nodules [Oral Ulcers] : no oral ulcers [Cough] : no cough [Dry Mouth] : no dry mouth [Dysphonia] : no dysphonia [Dysphagia] : no dysphagia [Shortness of Breath] : no shortness of breath [Chest Pain] : no chest pain [Joint Swelling] : no joint swelling [Joint Warmth] : no joint warmth [Joint Deformity] : no joint deformity [Decreased ROM] : no decreased range of motion [Morning Stiffness] : no morning stiffness [Falls] : no falls [Difficulty Standing] : no difficulty standing [Difficulty Walking] : no difficulty walking [Dyspnea] : no dyspnea [Muscle Weakness] : no muscle weakness [Muscle Spasms] : no muscle spasms [Muscle Cramping] : no muscle cramping [Visual Changes] : no visual changes [Eye Pain] : no eye pain [Eye Redness] : no eye redness [Dry Eyes] : no dry eyes

## 2020-12-14 NOTE — ASSESSMENT
[FreeTextEntry1] : 45 year old female with polyarticular joint pains and low back pain and found to have (+)[id number].  Presentation suggestive of undifferentiated spondyloarthropathy vs seronegative RA.  MRI L-spine reveals degenerative changes / no evidence of spondylitis.  DDx also includes AE's from tamoxifen, though less likely.  Symptoms had been improving on sulfasalazine, though appears to no longer  be responding.\par \par \par Impression:\par undifferentiated spondyloarthropathy . today with symptoms also suggestive of fibromyalgia.\par \par   - Will discuss w/ heme/onc re: starting MTX.   (avoiding biologics, given hx of breast CA)\par   - Hepatitis panel negative 6/20.\par   - Flu vaccine (9/20, per pt), Prevnar (8/20) UTD.\par   - Cont naproxen 500mg BID prn for now.\par   - Reiterated importance of back exercises.\par   - warm compresses\par   - OTC topical analgesics.\par   - Quantiferon negative 1/20.\par \par d/w Dr. Charles

## 2020-12-14 NOTE — END OF VISIT
[] : Fellow [FreeTextEntry3] : I performed a history and physical exam of the patient and discussed his management with the fellow. I reviewed the fellow’s note and agree with the documented findings and plan of care.  45 year old F w/ breast CA and polyarticular arthritis most c/w undifferentiated spondyloarthropathy - planning to start MTX, but need to discuss w/ heme-onc.  Also w/ diffuse pain, most suggestive ov concurrent fibromyalgia - recommended exercise and planning to start Lyrica - also to discuss first w/ heme-onc.

## 2020-12-14 NOTE — PHYSICAL EXAM
[General Appearance - Alert] : alert [General Appearance - In No Acute Distress] : in no acute distress [Sclera] : the sclera and conjunctiva were normal [Outer Ear] : the ears and nose were normal in appearance [Oropharynx] : the oropharynx was normal [Neck Appearance] : the appearance of the neck was normal [Neck Cervical Mass (___cm)] : no neck mass was observed [Jugular Venous Distention Increased] : there was no jugular-venous distention [Auscultation Breath Sounds / Voice Sounds] : lungs were clear to auscultation bilaterally [Heart Rate And Rhythm] : heart rate was normal and rhythm regular [Heart Sounds] : normal S1 and S2 [Heart Sounds Gallop] : no gallops [Murmurs] : no murmurs [Heart Sounds Pericardial Friction Rub] : no pericardial rub [Edema] : there was no peripheral edema [Bowel Sounds] : normal bowel sounds [Abdomen Soft] : soft [Abdomen Tenderness] : non-tender [Abdomen Mass (___ Cm)] : no abdominal mass palpated [Cervical Lymph Nodes Enlarged Posterior Bilaterally] : posterior cervical [Cervical Lymph Nodes Enlarged Anterior Bilaterally] : anterior cervical [Supraclavicular Lymph Nodes Enlarged Bilaterally] : supraclavicular [No Spinal Tenderness] : no spinal tenderness [Skin Color & Pigmentation] : normal skin color and pigmentation [Skin Turgor] : normal skin turgor [] : no rash [No Focal Deficits] : no focal deficits [Oriented To Time, Place, And Person] : oriented to person, place, and time [Impaired Insight] : insight and judgment were intact [Affect] : the affect was normal [FreeTextEntry1] : diffuse tender points over appendicular and axial skeleton. ttp over upper and lower body and over articular and non articular areas. no synovitis or joint deformities noted on exam.

## 2020-12-17 ENCOUNTER — TRANSCRIPTION ENCOUNTER (OUTPATIENT)
Age: 45
End: 2020-12-17

## 2020-12-29 ENCOUNTER — APPOINTMENT (OUTPATIENT)
Dept: DERMATOLOGY | Facility: CLINIC | Age: 45
End: 2020-12-29
Payer: MEDICAID

## 2020-12-29 VITALS — HEIGHT: 62 IN | BODY MASS INDEX: 31.28 KG/M2 | WEIGHT: 170 LBS

## 2020-12-29 PROCEDURE — 99203 OFFICE O/P NEW LOW 30 MIN: CPT

## 2020-12-29 PROCEDURE — 99072 ADDL SUPL MATRL&STAF TM PHE: CPT

## 2021-01-11 ENCOUNTER — APPOINTMENT (OUTPATIENT)
Dept: RHEUMATOLOGY | Facility: CLINIC | Age: 46
End: 2021-01-11
Payer: MEDICAID

## 2021-01-11 VITALS
SYSTOLIC BLOOD PRESSURE: 107 MMHG | RESPIRATION RATE: 14 BRPM | WEIGHT: 170 LBS | HEART RATE: 84 BPM | BODY MASS INDEX: 31.28 KG/M2 | TEMPERATURE: 98.3 F | OXYGEN SATURATION: 100 % | DIASTOLIC BLOOD PRESSURE: 79 MMHG | HEIGHT: 62 IN

## 2021-01-11 PROCEDURE — 99215 OFFICE O/P EST HI 40 MIN: CPT

## 2021-01-11 PROCEDURE — 99072 ADDL SUPL MATRL&STAF TM PHE: CPT

## 2021-01-11 NOTE — DATA REVIEWED
[FreeTextEntry1] : 6/5/20 ZP Magno SmmgT/US: priors to 2017, dense, magno post surgical changes, stable calcs with dystropic appearance, no susp findings noted magno. BR2\par 6/5/20 ZP Magno US: compared to 2019, R 1:00 N3 scar, 2;00 N3 (5mm) stable cyst; L 1:00 N9 (1.4cm), (0.5cm) and (1.3cm) seromas. BR2\par \par

## 2021-01-11 NOTE — PHYSICAL EXAM
[General Appearance - Alert] : alert [General Appearance - In No Acute Distress] : in no acute distress [Sclera] : the sclera and conjunctiva were normal [Oropharynx] : the oropharynx was normal [Outer Ear] : the ears and nose were normal in appearance [Neck Appearance] : the appearance of the neck was normal [Neck Cervical Mass (___cm)] : no neck mass was observed [Jugular Venous Distention Increased] : there was no jugular-venous distention [Auscultation Breath Sounds / Voice Sounds] : lungs were clear to auscultation bilaterally [Heart Rate And Rhythm] : heart rate was normal and rhythm regular [Heart Sounds] : normal S1 and S2 [Heart Sounds Gallop] : no gallops [Murmurs] : no murmurs [Heart Sounds Pericardial Friction Rub] : no pericardial rub [Edema] : there was no peripheral edema [Bowel Sounds] : normal bowel sounds [Abdomen Soft] : soft [Abdomen Tenderness] : non-tender [Abdomen Mass (___ Cm)] : no abdominal mass palpated [Cervical Lymph Nodes Enlarged Posterior Bilaterally] : posterior cervical [Cervical Lymph Nodes Enlarged Anterior Bilaterally] : anterior cervical [Supraclavicular Lymph Nodes Enlarged Bilaterally] : supraclavicular [No Spinal Tenderness] : no spinal tenderness [Skin Color & Pigmentation] : normal skin color and pigmentation [Skin Turgor] : normal skin turgor [] : no rash [No Focal Deficits] : no focal deficits [Oriented To Time, Place, And Person] : oriented to person, place, and time [Impaired Insight] : insight and judgment were intact [Affect] : the affect was normal [FreeTextEntry1] : diffuse tender points over appendicular and axial skeleton. ttp over upper and lower body and over articular and non articular areas. no synovitis or joint deformities noted on exam.

## 2021-01-11 NOTE — ASSESSMENT
[FreeTextEntry1] : 46 y/o BRCA neg female here for f.u, hx of bilat BCT, pt is 3 years out.  Pt denies any breast lesions, discharge or masses. Occasionally feels the left seroma. \par \par 8/11/17 S/p bilat BCS with R SLN, bilat breast reduction, oncoplastic surgery and biozorb insertion on  for RIGHT UIQ [id number] Infiltrating ductal Cancer, margins 1.2 cm, Gr 2. LN 1/4, ER/DC positive and [id number] neg. Oncotype was 7. LEFT was pT1mic, N0, Margins neg at 5 mm. ER+/DC neg and Her2 neg. s/p L SLNBx due to microinvasion on final path 10/13/17. \par \par Reporting bilateral tenderness to arms with intermittent swelling at Bx site intermittently but not now. \par Taking Naproxen for relief of arm pain. Was taking sulfasalazine but no help so stopped. Also diminished ROM with raised arms. Is currently followed by Rheumatology () for arthralgia/ ? Lymes Disease.  Occasional swelling to hands. Request PT referral.\par \par Sees medical oncologist: Wade Alejo, Started tamoxifen 10/24/17. Still cycling monthly, however this month has 2 cycles. Has new GYN, Dr. Yumi Miller and saw 10/7/20.\par Finished EBRT 2/18/18 Dr. Nogueira rad onc. Discharged plastic surgery Dr. Gatica.\par \par 6/5/20 ZP Wilfred SmmgT/US: priors to 2017, dense, wilfred post surgical changes, stable calcs with dystropic appearance, no susp findings noted wilfred. BR2\par 6/5/20 ZP Wilfred US: compared to 2019, R 1:00 N3 scar, 2;00 N3 (5mm) stable cyst; L 1:00 N9 (1.4cm), (0.5cm) and (1.3cm) seromas. BR2\par \par 12/16/19 ZP MRI, compared to 6/30/17, mmg 12/16/19, Wilfred scattered enhancing nonspecific foci, Wilfred post surgical changes, no abn findings; no lymphadenopathy, BR2.\par CBE: Bilat reduction scars. Excellent cosmesis. Biozorb no longer palpable. No adenopathy. CORIE\par ROM limited at shoulders. No obvious hand lymphedema today.\par Refer to PT. MRI due.

## 2021-01-11 NOTE — PHYSICAL EXAM
[Normocephalic] : normocephalic [Atraumatic] : atraumatic [Supple] : supple [No Supraclavicular Adenopathy] : no supraclavicular adenopathy [No Thyromegaly] : no thyromegaly [Examined in the supine and seated position] : examined in the supine and seated position [Symmetrical] : symmetrical [Bra Size: ___] : Bra Size: [unfilled] [No dominant masses] : no dominant masses in right breast  [No dominant masses] : no dominant masses left breast [No Nipple Retraction] : no left nipple retraction [No Nipple Discharge] : no left nipple discharge [No Axillary Lymphadenopathy] : no left axillary lymphadenopathy [No Edema] : no edema [No Rashes] : no rashes [No Ulceration] : no ulceration [de-identified] : Excellent cosmesis. Bilat reduction scars [de-identified] : Biozorb not obvious

## 2021-01-11 NOTE — HISTORY OF PRESENT ILLNESS
[___ Month(s) Ago] : [unfilled] month(s) ago [Fatigue] : fatigue [Arthralgias] : arthralgias [Myalgias] : myalgias [FreeTextEntry1] : Feeling "the same" since last visit.  Still w/ joint pains, worst in her hands and shoulders.  Also still w/ diffuse body aches, in upper and lower body.  No new complaints. [Anorexia] : no anorexia [Weight Loss] : no weight loss [Malaise] : no malaise [Fever] : no fever [Chills] : no chills [Malar Facial Rash] : no malar facial rash [Skin Lesions] : no lesions [Skin Nodules] : no skin nodules [Oral Ulcers] : no oral ulcers [Cough] : no cough [Dry Mouth] : no dry mouth [Dysphonia] : no dysphonia [Dysphagia] : no dysphagia [Shortness of Breath] : no shortness of breath [Chest Pain] : no chest pain [Joint Swelling] : no joint swelling [Joint Warmth] : no joint warmth [Joint Deformity] : no joint deformity [Decreased ROM] : no decreased range of motion [Morning Stiffness] : no morning stiffness [Falls] : no falls [Difficulty Standing] : no difficulty standing [Difficulty Walking] : no difficulty walking [Dyspnea] : no dyspnea [Muscle Weakness] : no muscle weakness [Muscle Spasms] : no muscle spasms [Muscle Cramping] : no muscle cramping [Visual Changes] : no visual changes [Eye Pain] : no eye pain [Eye Redness] : no eye redness [Dry Eyes] : no dry eyes

## 2021-01-11 NOTE — CONSULT LETTER
[Dear  ___] : Dear  [unfilled], [Courtesy Letter:] : I had the pleasure of seeing your patient, [unfilled], in my office today. [Please see my note below.] : Please see my note below. [Consult Closing:] : Thank you very much for allowing me to participate in the care of this patient.  If you have any questions, please do not hesitate to contact me. [Sincerely,] : Sincerely, [FreeTextEntry3] : Caroline Benítez MD

## 2021-01-11 NOTE — PAST MEDICAL HISTORY
[Perimenopausal] : The patient is perimenopausal [Menarche Age ____] : age at menarche was [unfilled] [Total Preg ___] : G[unfilled] [Living ___] : Living: [unfilled] [Age At Live Birth ___] : Age at live birth: [unfilled] [FreeTextEntry6] : none [FreeTextEntry7] : 6 mos [FreeTextEntry8] : 6 mos

## 2021-01-11 NOTE — ASSESSMENT
[FreeTextEntry1] : 45 year old female with polyarticular joint pains and low back pain and found to have (+)HLA-B27.  Presentation suggestive of undifferentiated spondyloarthropathy vs seronegative RA.  MRI L-spine reveals degenerative changes / no evidence of spondylitis.  DDx also includes AE's from tamoxifen, though less likely.  Symptoms had been improving on sulfasalazine, but it then lost effect.  Pt also c/o frequent leg cramps.\par   - Start MTX 10mg weekly x 2 weeks, then 15mg weekly folic acid 1mg daily.\par   - Hepatitis panel negative 6/20.\par   - Flu vaccine (9/20, per pt), Prevnar (8/20) UTD.\par   - Cont naproxen 500mg BID prn for now.\par   - Check labs, including electrolytes.\par   - Reiterated importance of exercise.\par   - warm compresses\par   - OTC topical analgesics.\par   - Quantiferon negative 1/20.\par   - Recommended stretching exercises for leg cramps.

## 2021-01-11 NOTE — HISTORY OF PRESENT ILLNESS
[FreeTextEntry1] : 46 y/o BRCA neg female here for f.u, hx of bilat BCT, pt is 3 years out.  Pt denies any breast lesions, discharge or masses. Occasionally feels the left seroma. \par \par 8/11/17 S/p bilat BCS with R SLN, bilat breast reduction, oncoplastic surgery and biozorb insertion on  for RIGHT UIQ [id number] Infiltrating ductal Cancer, margins 1.2 cm, Gr 2. LN 1/4, ER/TN positive and Cvp2wpm neg. Oncotype was 7. LEFT was pT1mic, N0, Margins neg at 5 mm. ER+/TN neg and Her2 neg. s/p L SLNBx due to microinvasion on final path 10/13/17. \par \par Reporting bilateral tenderness to arms with intermittent swelling at Bx site intermittently but not now. \par Taking Naproxen for relief of arm pain. Was taking sulfasalazine but no help so stopped. Also diminished ROM with raised arms. Is currently followed by Rheumatology () for arthralgia/ ? Lymes Disease.  Occasional swelling to hands. Request PT referral.\par \par Sees medical oncologist: Wade Alejo, Started tamoxifen 10/24/17. Still cycling monthly, however this month has 2 cycles. Has new GYN, Dr. Yumi Miller and saw 10/7/20.\par Finished EBRT 2/18/18 Dr. Nogueira rad onc. Discharged plastic surgery Dr. Gatica.\par \par 6/5/20 ZP Magno SmmgT/US: priors to 2017, dense, magno post surgical changes, stable calcs with dystropic appearance, no susp findings noted magno. BR2\par 6/5/20 ZP Magno US: compared to 2019, R 1:00 N3 scar, 2;00 N3 (5mm) stable cyst; L 1:00 N9 (1.4cm), (0.5cm) and (1.3cm) seromas. BR2\par \par 12/16/19 ZP MRI, compared to 6/30/17, mmg 12/16/19, Magno scattered enhancing nonspecific foci, Magno post surgical changes, no abn findings; no lymphadenopathy, BR2.\par \par Still going through divorce.

## 2021-01-12 LAB
ALBUMIN SERPL ELPH-MCNC: 4.4 G/DL
ALP BLD-CCNC: 59 U/L
ALT SERPL-CCNC: 9 U/L
ANION GAP SERPL CALC-SCNC: 10 MMOL/L
AST SERPL-CCNC: 16 U/L
BASOPHILS # BLD AUTO: 0.03 K/UL
BASOPHILS NFR BLD AUTO: 0.6 %
BILIRUB SERPL-MCNC: 0.2 MG/DL
BUN SERPL-MCNC: 14 MG/DL
CALCIUM SERPL-MCNC: 9.4 MG/DL
CHLORIDE SERPL-SCNC: 105 MMOL/L
CO2 SERPL-SCNC: 26 MMOL/L
CREAT SERPL-MCNC: 0.8 MG/DL
CRP SERPL-MCNC: 0.78 MG/DL
EOSINOPHIL # BLD AUTO: 0.04 K/UL
EOSINOPHIL NFR BLD AUTO: 0.8 %
ERYTHROCYTE [SEDIMENTATION RATE] IN BLOOD BY WESTERGREN METHOD: 11 MM/HR
GLUCOSE SERPL-MCNC: 103 MG/DL
HCT VFR BLD CALC: 37.4 %
HGB BLD-MCNC: 11.7 G/DL
IMM GRANULOCYTES NFR BLD AUTO: 0.2 %
LYMPHOCYTES # BLD AUTO: 1.52 K/UL
LYMPHOCYTES NFR BLD AUTO: 29.5 %
MAGNESIUM SERPL-MCNC: 2 MG/DL
MAN DIFF?: NORMAL
MCHC RBC-ENTMCNC: 31.3 GM/DL
MCHC RBC-ENTMCNC: 32.1 PG
MCV RBC AUTO: 102.5 FL
MONOCYTES # BLD AUTO: 0.36 K/UL
MONOCYTES NFR BLD AUTO: 7 %
NEUTROPHILS # BLD AUTO: 3.2 K/UL
NEUTROPHILS NFR BLD AUTO: 61.9 %
PLATELET # BLD AUTO: 227 K/UL
POTASSIUM SERPL-SCNC: 5.1 MMOL/L
PROT SERPL-MCNC: 7.2 G/DL
RBC # BLD: 3.65 M/UL
RBC # FLD: 12.3 %
SODIUM SERPL-SCNC: 140 MMOL/L
WBC # FLD AUTO: 5.16 K/UL

## 2021-01-19 ENCOUNTER — APPOINTMENT (OUTPATIENT)
Dept: DERMATOLOGY | Facility: CLINIC | Age: 46
End: 2021-01-19
Payer: MEDICAID

## 2021-01-19 PROCEDURE — 99072 ADDL SUPL MATRL&STAF TM PHE: CPT

## 2021-01-19 PROCEDURE — 99213 OFFICE O/P EST LOW 20 MIN: CPT

## 2021-01-22 ENCOUNTER — OUTPATIENT (OUTPATIENT)
Dept: OUTPATIENT SERVICES | Facility: HOSPITAL | Age: 46
LOS: 1 days | Discharge: HOME | End: 2021-01-22

## 2021-01-22 DIAGNOSIS — C50.919 MALIGNANT NEOPLASM OF UNSPECIFIED SITE OF UNSPECIFIED FEMALE BREAST: ICD-10-CM

## 2021-01-26 ENCOUNTER — APPOINTMENT (OUTPATIENT)
Dept: HEMATOLOGY ONCOLOGY | Facility: CLINIC | Age: 46
End: 2021-01-26
Payer: MEDICAID

## 2021-01-26 VITALS
RESPIRATION RATE: 16 BRPM | HEIGHT: 62 IN | HEART RATE: 80 BPM | WEIGHT: 170 LBS | SYSTOLIC BLOOD PRESSURE: 127 MMHG | TEMPERATURE: 97.7 F | DIASTOLIC BLOOD PRESSURE: 82 MMHG | OXYGEN SATURATION: 98 % | BODY MASS INDEX: 31.28 KG/M2

## 2021-01-26 PROCEDURE — 99214 OFFICE O/P EST MOD 30 MIN: CPT

## 2021-01-26 PROCEDURE — 99072 ADDL SUPL MATRL&STAF TM PHE: CPT

## 2021-01-26 RX ORDER — MUPIROCIN 20 MG/G
2 OINTMENT TOPICAL
Qty: 22 | Refills: 0 | Status: DISCONTINUED | COMMUNITY
Start: 2020-12-15

## 2021-01-27 NOTE — PHYSICAL EXAM
[Fully active, able to carry on all pre-disease performance without restriction] : Status 0 - Fully active, able to carry on all pre-disease performance without restriction [Normal] : affect appropriate [de-identified] : s/p B/L LE and redn mammoplasty with well healed scars, no masses; B/L ax neg

## 2021-01-27 NOTE — HISTORY OF PRESENT ILLNESS
[de-identified] : Ms. Flowers is a [place] female who on June 2, 2017 saw her gynecologist, Dr. Corrigan, as she had menses twice in the same month and subsequently felt tired. She was then referred for routine screening mammogram, which was performed on June 5, 2017 with the finding of a right breast mass with associated architectural distortion suspicious for malignancy with ultrasound-guided core biopsy recommended. The patient also consequently had a bilateral breast ultrasound on the same date with a finding of a hypoechoic mass with irregular margins and posterior shadowing at the 1 o'clock axis of the right breast corresponding to the findings on the mammogram with no further suspicious findings; ultrasound-guided core biopsy was recommended. She ultimately had an ultrasound-guided core biopsy performed on June 12, 2017 with a finding of invasive moderately differentiated duct carcinoma, with the largest focus of invasive carcinoma measuring up to 5 mm with evidence of DCIS, intermediate nuclear grade, solid and cribriform types, with no evidence of lymphovascular invasion, ER positive (77%), NC positive (83%), and HER-2/carmen (1+) and negative. The patient then went on to have a bilateral breast MRI with the right breast showing, within the upper inner quadrant, a spiculated enhancing mass corresponding to the biopsy-proven cancer measuring up to 2 cm in the mediolateral direction, and 1.7 cm in the anterior-posterior direction, and 1.5 cm in the craniocaudal direction; left breast showed, within the upper outer quadrant, a somewhat serpiginous area of enhancement with the more anterior aspect becoming more nodular and measuring up to 1.1 cm in size, with additional scattered nonspecific enhancing foci; the axillae were unremarkable. 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 The patient subsequently was seen with complaint of pain and burning sensation and a new "lump" at the left breast biopsy site with a comment from the radiologist of a small hematoma with surrounding ecchymosis present on July 22, 2017, with instructions for warm compresses and pressure. The patient ultimately saw Dr. Caroline Benítez and underwent a bilateral lumpectomy and reduction mammoplasty, which was performed on August 11, 2017, at [facility] with a finding in the right breast of an invasive ductal carcinoma, moderately differentiated, measuring 2 cm in greatest diameter, nuclear, Garretson score 7/9, with evidence of DCIS and LCIS present in addition to fibrocystic changes with margins negative for carcinoma, with 0/3 sentinel lymph nodes involved with carcinoma; the left breast excisional biopsy showed evidence of ductal carcinoma in situ with microinvasion, with DCIS being of high nuclear grade, solid and comedo type with central necrosis, evidence of lobular carcinoma in situ, with margins of resection negative for DCIS and microinvasion. The patient returned to the operating room on October 13, 2017 for a left sentinel lymph node biopsy with a finding of 0/3 left sentinel lymph nodes involved with carcinoma. She subsequently had Oncotype DX testing sent off on her right breast carcinoma with a finding of a recurrence score of 7, correlating to an 8% risk of distant recurrence within 10 years should she take tamoxifen alone. The patient saw a medical oncologist, Dr. Maldonado, at [facility] who recommended adjuvant chemotherapy with CMF. She also had BRCA testing sent off, specifically a BRCA 1/2 [employer] 3-site mutation panel, which returned negative of note.\par \par She saw me in initial consultation in 11/17 and I recommended that she proceed with further genetic predisposition testing which returned negative for other known genetic predisposition mutations. Started Tamoxifen on October 24th 2017. She completed RT at [place] () on 2/13/18. \par \par Disease: breast cancer  \par Pathology: right breast IDC; left breast DCIS \par TNM stage: T1, N0, M0 \par AJCC Stage: 1 \par \par In the past she had c/o increasing arthralgias and joint swelling. She followed up with her PCP and was found to have Lyme IgG Ab P23 s/p doxycycline 100mg BID x 21 days in the beginning of June 2019.  She was prescribed another 21 days but refused to take it due to side effects.  Since then, she reported improvement in her forgetfulness, lightheadedness, fatigue, hair thinning.  Fevers and night sweats resolved. Thyroid workup neg, mildly elevated TSH.  \par \par She was last seen in 7/19. In the interim she c/o gradually worsening arthralgias and body aches especially since 11/19. She saw a new PCP - Dr Grimm who then referred her for an ID consult, Dr aKba, and had no serologic evidence of Lyme Dz, or other active infections. She then had a rheum consult with Dr. Charles, who assessed:\par \par "44 year old female presents with polyarticular joint pains and myalgias of unclear etiology. Some of her symptoms, including the distribution of joints (MCP's and PIP's), joint swelling, elevated CRP, and family history, are suggestive of rheumatoid arthritis, though other symptoms, including pain outside of the joints and lack of prolonged AM stiffness, are atypical. I have therefore ordered some more bloodwork and x-rays as further workup. She will follow up with me again in 2 weeks to review her results. In the meantime, I have also started her on a trial of low-dose prednisone."\par \par Pt reports prednisone did not lead to any improvement so she d/c'd it.  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 DDx also includes AE's from tamoxifen, though less likely.\par  - Rx naproxen 500mg BID.\par  - x-rays of L-spine, S-I joints.\par \par 44 year old female with polyarticular joint pains and low back pain and found to have (+)HLA-B27. 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 DDx also includes AE's from tamoxifen, though less likely.\par  - Rx naproxen 500mg BID.\par  - x-rays of L-spine, S-I joints.\par \par She ultimately was seen by Dr Charles (rheum). Found  CRP elevated, diffuse pain, undifferentiated spondyloarthropathy, inflammatory arthritis, muscle cramps, polyarticular joint pains and low back pain and found to have (+)HLA-B27. Presentation suggestive of undifferentiated spondyloarthropathy vs seronegative RA. MRI L-spine revealed degenerative changes / no evidence of spondylitis. DDx also included AE's from tamoxifen, though less likely. Symptoms had been improving on sulfasalazine, but then lost effect. Pt was recommended to start methotrexate. \par \par  \par  [de-identified] : Seen today for a f/u visit.. \par \par She c/o persistent intermittent joint swelling, arthralgias, low back pain, muscles aches and generalized fatigue. Seen by Dr Charles in the interim and recommended to start methotrexate. She has not proceeded yet secondary to her research online which raised concerns for potential side effects. She has called Dr Charles and is awaiting a call back to have her concerns addressed and will likely then start methotrexate. \par \par Pt c/o increased weight over time. \par \par She otherwise denies all other new complaints. She denies any tamoxifen related side effects. \par \par Menses remain regular. \par   \par 6/20 mammo/sono \par 1/21 MRI neg / benign findings.

## 2021-01-27 NOTE — REVIEW OF SYSTEMS
[Negative] : Endocrine [Fever] : no fever [Chills] : no chills [Night Sweats] : no night sweats [Recent Change In Weight] : ~T no recent weight change [Abdominal Pain] : no abdominal pain [Vomiting] : no vomiting [Constipation] : no constipation [Diarrhea] : no diarrhea [FreeTextEntry2] : as above [FreeTextEntry9] : as above

## 2021-02-22 ENCOUNTER — TRANSCRIPTION ENCOUNTER (OUTPATIENT)
Age: 46
End: 2021-02-22

## 2021-02-25 LAB
ALBUMIN SERPL ELPH-MCNC: 4.4 G/DL
ALP BLD-CCNC: 52 U/L
ALT SERPL-CCNC: 8 U/L
ANION GAP SERPL CALC-SCNC: 12 MMOL/L
AST SERPL-CCNC: 14 U/L
BASOPHILS # BLD AUTO: 0.05 K/UL
BASOPHILS NFR BLD AUTO: 0.9 %
BILIRUB SERPL-MCNC: 0.2 MG/DL
BUN SERPL-MCNC: 12 MG/DL
CALCIUM SERPL-MCNC: 9.2 MG/DL
CHLORIDE SERPL-SCNC: 102 MMOL/L
CO2 SERPL-SCNC: 24 MMOL/L
CREAT SERPL-MCNC: 0.79 MG/DL
CRP SERPL-MCNC: 0.7 MG/DL
EOSINOPHIL # BLD AUTO: 0.05 K/UL
EOSINOPHIL NFR BLD AUTO: 0.9 %
ERYTHROCYTE [SEDIMENTATION RATE] IN BLOOD BY WESTERGREN METHOD: 27 MM/HR
GLUCOSE SERPL-MCNC: 100 MG/DL
HCT VFR BLD CALC: 35.4 %
HGB BLD-MCNC: 11.6 G/DL
IMM GRANULOCYTES NFR BLD AUTO: 0.2 %
LYMPHOCYTES # BLD AUTO: 1.81 K/UL
LYMPHOCYTES NFR BLD AUTO: 31.5 %
MAN DIFF?: NORMAL
MCHC RBC-ENTMCNC: 32.8 GM/DL
MCHC RBC-ENTMCNC: 33.1 PG
MCV RBC AUTO: 101.1 FL
MONOCYTES # BLD AUTO: 0.41 K/UL
MONOCYTES NFR BLD AUTO: 7.1 %
NEUTROPHILS # BLD AUTO: 3.42 K/UL
NEUTROPHILS NFR BLD AUTO: 59.4 %
PLATELET # BLD AUTO: 253 K/UL
POTASSIUM SERPL-SCNC: 4.4 MMOL/L
PROT SERPL-MCNC: 7.5 G/DL
RBC # BLD: 3.5 M/UL
RBC # FLD: 11.9 %
SODIUM SERPL-SCNC: 139 MMOL/L
WBC # FLD AUTO: 5.75 K/UL

## 2021-03-01 ENCOUNTER — APPOINTMENT (OUTPATIENT)
Dept: RHEUMATOLOGY | Facility: CLINIC | Age: 46
End: 2021-03-01
Payer: MEDICAID

## 2021-03-01 VITALS
WEIGHT: 169 LBS | OXYGEN SATURATION: 99 % | HEART RATE: 82 BPM | DIASTOLIC BLOOD PRESSURE: 79 MMHG | SYSTOLIC BLOOD PRESSURE: 127 MMHG | BODY MASS INDEX: 31.1 KG/M2 | HEIGHT: 62 IN | TEMPERATURE: 97.7 F

## 2021-03-01 PROCEDURE — 99214 OFFICE O/P EST MOD 30 MIN: CPT

## 2021-03-01 PROCEDURE — 99072 ADDL SUPL MATRL&STAF TM PHE: CPT

## 2021-03-01 NOTE — ASSESSMENT
[FreeTextEntry1] : 45 year old female with polyarticular joint pains and low back pain and found to have (+)HLA-B27.  Presentation suggestive of undifferentiated spondyloarthropathy vs seronegative RA.  MRI L-spine reveals degenerative changes / no evidence of spondylitis.  DDx also includes AE's from tamoxifen, though less likely.  Symptoms had been improving on sulfasalazine, but it then lost effect.  Currently w/ mild improvement on MTX (LUE joints have improved, not RUE joints to date).  Pt also c/o frequent leg cramps.\par   - Cont MTX 15mg weekly folic acid 1mg daily.\par   - Hepatitis panel negative 6/20.\par   - Flu vaccine (9/20, per pt), Prevnar (8/20) UTD.  Recommended that pt schedule appt for the COVID vaccine.\par   - MRI S-I joints.\par   - Cont naproxen 500mg BID prn for now.\par   - Reiterated importance of exercise.\par   - warm compresses\par   - OTC topical analgesics.\par   - Quantiferon negative 1/20.\par   - Stretching exercises for leg cramps.

## 2021-03-01 NOTE — PHYSICAL EXAM
[General Appearance - Alert] : alert [General Appearance - In No Acute Distress] : in no acute distress [Sclera] : the sclera and conjunctiva were normal [Outer Ear] : the ears and nose were normal in appearance [Oropharynx] : the oropharynx was normal [Neck Appearance] : the appearance of the neck was normal [Neck Cervical Mass (___cm)] : no neck mass was observed [Jugular Venous Distention Increased] : there was no jugular-venous distention [Auscultation Breath Sounds / Voice Sounds] : lungs were clear to auscultation bilaterally [Heart Rate And Rhythm] : heart rate was normal and rhythm regular [Heart Sounds] : normal S1 and S2 [Heart Sounds Gallop] : no gallops [Murmurs] : no murmurs [Heart Sounds Pericardial Friction Rub] : no pericardial rub [Edema] : there was no peripheral edema [Bowel Sounds] : normal bowel sounds [Abdomen Soft] : soft [Abdomen Tenderness] : non-tender [Abdomen Mass (___ Cm)] : no abdominal mass palpated [Cervical Lymph Nodes Enlarged Posterior Bilaterally] : posterior cervical [Cervical Lymph Nodes Enlarged Anterior Bilaterally] : anterior cervical [Supraclavicular Lymph Nodes Enlarged Bilaterally] : supraclavicular [No Spinal Tenderness] : no spinal tenderness [Skin Color & Pigmentation] : normal skin color and pigmentation [Skin Turgor] : normal skin turgor [] : no rash [No Focal Deficits] : no focal deficits [Oriented To Time, Place, And Person] : oriented to person, place, and time [Impaired Insight] : insight and judgment were intact [Affect] : the affect was normal [FreeTextEntry1] : diffuse tender points over appendicular and axial skeleton, including throughout right hand. ttp over upper and lower body and over articular and non articular areas. no synovitis or joint deformities noted on exam.

## 2021-03-01 NOTE — HISTORY OF PRESENT ILLNESS
[___ Month(s) Ago] : [unfilled] month(s) ago [Fatigue] : fatigue [Arthralgias] : arthralgias [Myalgias] : myalgias [FreeTextEntry1] : Still w/ severe diffuse joint pains, worst in the lower back / upper pelvis.  The pain is constant, occurring even at rest.  She does report some improvement in the joints of her LUE, though the RUE joints remain severely painful.  She also c/o insomnia and occasional dizziness since starting MTX + folic acid.  No other new complaints.   [Anorexia] : no anorexia [Weight Loss] : no weight loss [Malaise] : no malaise [Fever] : no fever [Chills] : no chills [Malar Facial Rash] : no malar facial rash [Skin Lesions] : no lesions [Skin Nodules] : no skin nodules [Oral Ulcers] : no oral ulcers [Cough] : no cough [Dry Mouth] : no dry mouth [Dysphonia] : no dysphonia [Dysphagia] : no dysphagia [Shortness of Breath] : no shortness of breath [Chest Pain] : no chest pain [Joint Swelling] : no joint swelling [Joint Warmth] : no joint warmth [Joint Deformity] : no joint deformity [Decreased ROM] : no decreased range of motion [Morning Stiffness] : no morning stiffness [Falls] : no falls [Difficulty Standing] : no difficulty standing [Difficulty Walking] : no difficulty walking [Dyspnea] : no dyspnea [Muscle Weakness] : no muscle weakness [Muscle Spasms] : no muscle spasms [Muscle Cramping] : no muscle cramping [Visual Changes] : no visual changes [Eye Pain] : no eye pain [Eye Redness] : no eye redness [Dry Eyes] : no dry eyes

## 2021-03-12 ENCOUNTER — NON-APPOINTMENT (OUTPATIENT)
Age: 46
End: 2021-03-12

## 2021-03-16 ENCOUNTER — APPOINTMENT (OUTPATIENT)
Dept: DERMATOLOGY | Facility: CLINIC | Age: 46
End: 2021-03-16
Payer: MEDICAID

## 2021-03-16 ENCOUNTER — NON-APPOINTMENT (OUTPATIENT)
Age: 46
End: 2021-03-16

## 2021-03-16 PROCEDURE — 99213 OFFICE O/P EST LOW 20 MIN: CPT

## 2021-03-16 PROCEDURE — 99072 ADDL SUPL MATRL&STAF TM PHE: CPT

## 2021-04-05 ENCOUNTER — APPOINTMENT (OUTPATIENT)
Dept: RHEUMATOLOGY | Facility: CLINIC | Age: 46
End: 2021-04-05
Payer: MEDICAID

## 2021-04-05 ENCOUNTER — RX RENEWAL (OUTPATIENT)
Age: 46
End: 2021-04-05

## 2021-04-05 VITALS
DIASTOLIC BLOOD PRESSURE: 75 MMHG | HEIGHT: 62 IN | OXYGEN SATURATION: 99 % | SYSTOLIC BLOOD PRESSURE: 110 MMHG | TEMPERATURE: 98 F | BODY MASS INDEX: 31.28 KG/M2 | WEIGHT: 170 LBS | HEART RATE: 76 BPM

## 2021-04-05 PROCEDURE — 99072 ADDL SUPL MATRL&STAF TM PHE: CPT

## 2021-04-05 PROCEDURE — 99215 OFFICE O/P EST HI 40 MIN: CPT

## 2021-04-05 NOTE — ASSESSMENT
[FreeTextEntry1] : 45 year old female with polyarticular joint pains and low back pain and found to have (+)HLA-B27 - suggestive of undifferentiated spondyloarthropathy, though current distribution of affected joints (hands/feet) more c/w seronegative RA.  MRI L-spine reveals degenerative changes / no evidence of spondylitis.  DDx also includes AE's from tamoxifen, though less likely.  Symptoms had been improving on sulfasalazine, but it then lost effect.  Currently w/ mild improvement on MTX (LUE joints have improved, not RUE joints to date), but not tolerating it (abd pain/diarrhea).  Pt also c/o frequent leg cramps.\par   - D/C MTX.  \par   - Will plan to start rituximab - will discuss w/ heme-onc.\par   - Hepatitis panel negative 6/20.\par   - Flu vaccine (9/20, per pt), Prevnar (8/20) UTD.  Recommended that pt schedule appt for the COVID vaccine.\par   - Awaiting prior auth for MRI S-I joints.\par   - Cont naproxen 500mg BID prn for now.\par   - Reiterated importance of exercise.\par   - warm compresses\par   - OTC topical analgesics.\par   - Quantiferon negative 1/20.\par   - Check labs, including Quantiferon.\par   - Stretching exercises for leg cramps.

## 2021-04-05 NOTE — HISTORY OF PRESENT ILLNESS
[___ Month(s) Ago] : [unfilled] month(s) ago [Fatigue] : fatigue [Arthralgias] : arthralgias [Myalgias] : myalgias [FreeTextEntry1] : Pt had been experiencing diarrhea, so she held the MTX for several days and the diarrhea resolved.  Upon re-starting MTX, she began to experience abdominal pain, though not as severe as previously.  The diarrhea has not recurred.  Still w/ pain in her hands (R>L) and feet, worst at rest and improves with activity.  (+)AM stiffness x 1 hour.  She also complains that her right eye has been very "watery".\par Still w/ severe diffuse joint pains, worst in the lower back / upper pelvis.  The pain is constant, occurring even at rest.  She does report some improvement in the joints of her LUE, though the RUE joints remain severely painful.  She also c/o insomnia and occasional dizziness since starting MTX + folic acid.  No other new complaints.   [Anorexia] : no anorexia [Weight Loss] : no weight loss [Malaise] : no malaise [Fever] : no fever [Chills] : no chills [Malar Facial Rash] : no malar facial rash [Skin Lesions] : no lesions [Skin Nodules] : no skin nodules [Oral Ulcers] : no oral ulcers [Cough] : no cough [Dry Mouth] : no dry mouth [Dysphonia] : no dysphonia [Dysphagia] : no dysphagia [Shortness of Breath] : no shortness of breath [Chest Pain] : no chest pain [Joint Swelling] : no joint swelling [Joint Warmth] : no joint warmth [Joint Deformity] : no joint deformity [Decreased ROM] : no decreased range of motion [Morning Stiffness] : no morning stiffness [Falls] : no falls [Difficulty Standing] : no difficulty standing [Difficulty Walking] : no difficulty walking [Dyspnea] : no dyspnea [Muscle Weakness] : no muscle weakness [Muscle Spasms] : no muscle spasms [Muscle Cramping] : no muscle cramping [Visual Changes] : no visual changes [Eye Pain] : no eye pain [Eye Redness] : no eye redness [Dry Eyes] : no dry eyes

## 2021-04-05 NOTE — PHYSICAL EXAM
[General Appearance - Alert] : alert [General Appearance - In No Acute Distress] : in no acute distress [Sclera] : the sclera and conjunctiva were normal [Outer Ear] : the ears and nose were normal in appearance [Oropharynx] : the oropharynx was normal [Neck Appearance] : the appearance of the neck was normal [Neck Cervical Mass (___cm)] : no neck mass was observed [Jugular Venous Distention Increased] : there was no jugular-venous distention [Auscultation Breath Sounds / Voice Sounds] : lungs were clear to auscultation bilaterally [Heart Rate And Rhythm] : heart rate was normal and rhythm regular [Heart Sounds] : normal S1 and S2 [Heart Sounds Gallop] : no gallops [Murmurs] : no murmurs [Heart Sounds Pericardial Friction Rub] : no pericardial rub [Edema] : there was no peripheral edema [Bowel Sounds] : normal bowel sounds [Abdomen Soft] : soft [Abdomen Tenderness] : non-tender [Abdomen Mass (___ Cm)] : no abdominal mass palpated [Cervical Lymph Nodes Enlarged Posterior Bilaterally] : posterior cervical [Cervical Lymph Nodes Enlarged Anterior Bilaterally] : anterior cervical [Supraclavicular Lymph Nodes Enlarged Bilaterally] : supraclavicular [No Spinal Tenderness] : no spinal tenderness [Skin Color & Pigmentation] : normal skin color and pigmentation [Skin Turgor] : normal skin turgor [] : no rash [No Focal Deficits] : no focal deficits [Oriented To Time, Place, And Person] : oriented to person, place, and time [Impaired Insight] : insight and judgment were intact [Affect] : the affect was normal [FreeTextEntry1] : (+)tenderness in B/L 2nd-5th PIP's (R>L), 1st-5th MCP's (R>L), B/L wrists (R>L), B/L ankles., B/L feet (MTP squeeze)

## 2021-04-06 LAB
ALBUMIN SERPL ELPH-MCNC: 4.2 G/DL
ALP BLD-CCNC: 54 U/L
ALT SERPL-CCNC: 9 U/L
ANION GAP SERPL CALC-SCNC: 9 MMOL/L
AST SERPL-CCNC: 14 U/L
BASOPHILS # BLD AUTO: 0.03 K/UL
BASOPHILS NFR BLD AUTO: 0.5 %
BILIRUB SERPL-MCNC: 0.2 MG/DL
BUN SERPL-MCNC: 12 MG/DL
CALCIUM SERPL-MCNC: 9.1 MG/DL
CHLORIDE SERPL-SCNC: 104 MMOL/L
CO2 SERPL-SCNC: 26 MMOL/L
CREAT SERPL-MCNC: 0.6 MG/DL
CRP SERPL-MCNC: 6 MG/L
EOSINOPHIL # BLD AUTO: 0.04 K/UL
EOSINOPHIL NFR BLD AUTO: 0.7 %
ERYTHROCYTE [SEDIMENTATION RATE] IN BLOOD BY WESTERGREN METHOD: 16 MM/HR
GLUCOSE SERPL-MCNC: 104 MG/DL
HCT VFR BLD CALC: 35.9 %
HGB BLD-MCNC: 11.2 G/DL
IMM GRANULOCYTES NFR BLD AUTO: 0.2 %
LYMPHOCYTES # BLD AUTO: 1.3 K/UL
LYMPHOCYTES NFR BLD AUTO: 23 %
MAN DIFF?: NORMAL
MCHC RBC-ENTMCNC: 31.2 GM/DL
MCHC RBC-ENTMCNC: 32.7 PG
MCV RBC AUTO: 104.7 FL
MONOCYTES # BLD AUTO: 0.37 K/UL
MONOCYTES NFR BLD AUTO: 6.5 %
NEUTROPHILS # BLD AUTO: 3.9 K/UL
NEUTROPHILS NFR BLD AUTO: 69.1 %
PLATELET # BLD AUTO: 243 K/UL
POTASSIUM SERPL-SCNC: 4.4 MMOL/L
PROT SERPL-MCNC: 7.3 G/DL
RBC # BLD: 3.43 M/UL
RBC # FLD: 13.1 %
SODIUM SERPL-SCNC: 140 MMOL/L
WBC # FLD AUTO: 5.65 K/UL

## 2021-04-07 ENCOUNTER — APPOINTMENT (OUTPATIENT)
Dept: DISASTER EMERGENCY | Facility: OTHER | Age: 46
End: 2021-04-07
Payer: MEDICAID

## 2021-04-07 LAB
M TB IFN-G BLD-IMP: NEGATIVE
QUANTIFERON TB PLUS MITOGEN MINUS NIL: 8 IU/ML
QUANTIFERON TB PLUS NIL: 0.04 IU/ML
QUANTIFERON TB PLUS TB1 MINUS NIL: 0 IU/ML
QUANTIFERON TB PLUS TB2 MINUS NIL: -0.01 IU/ML

## 2021-04-07 PROCEDURE — 0002A: CPT

## 2021-05-13 RX ORDER — RITUXIMAB 10 MG/ML
500 INJECTION, SOLUTION INTRAVENOUS
Qty: 2 | Refills: 3 | Status: DISCONTINUED | COMMUNITY
Start: 2021-05-04 | End: 2021-05-13

## 2021-06-08 ENCOUNTER — NON-APPOINTMENT (OUTPATIENT)
Age: 46
End: 2021-06-08

## 2021-06-10 RX ORDER — DIPHENHYDRAMINE HYDROCHLORIDE 50 MG/ML
50 INJECTION, SOLUTION INTRAMUSCULAR; INTRAVENOUS
Qty: 1 | Refills: 0 | Status: COMPLETED | OUTPATIENT
Start: 2021-06-10 | End: 1900-01-01

## 2021-06-10 RX ORDER — METHYLPREDNISOLONE 125 MG/2ML
125 INJECTION, POWDER, LYOPHILIZED, FOR SOLUTION INTRAMUSCULAR; INTRAVENOUS
Qty: 0 | Refills: 0 | Status: COMPLETED | OUTPATIENT
Start: 2021-06-10 | End: 1900-01-01

## 2021-06-10 RX ORDER — ACETAMINOPHEN 500 MG/1
500 TABLET ORAL
Qty: 0 | Refills: 0 | Status: COMPLETED | OUTPATIENT
Start: 2021-06-10 | End: 1900-01-01

## 2021-06-11 ENCOUNTER — APPOINTMENT (OUTPATIENT)
Dept: RHEUMATOLOGY | Facility: CLINIC | Age: 46
End: 2021-06-11
Payer: MEDICAID

## 2021-06-11 VITALS
RESPIRATION RATE: 18 BRPM | SYSTOLIC BLOOD PRESSURE: 124 MMHG | HEART RATE: 75 BPM | OXYGEN SATURATION: 97 % | DIASTOLIC BLOOD PRESSURE: 84 MMHG

## 2021-06-11 VITALS
SYSTOLIC BLOOD PRESSURE: 122 MMHG | OXYGEN SATURATION: 97 % | DIASTOLIC BLOOD PRESSURE: 74 MMHG | RESPIRATION RATE: 19 BRPM | HEART RATE: 84 BPM

## 2021-06-11 VITALS
OXYGEN SATURATION: 98 % | HEART RATE: 87 BPM | RESPIRATION RATE: 19 BRPM | DIASTOLIC BLOOD PRESSURE: 69 MMHG | SYSTOLIC BLOOD PRESSURE: 123 MMHG

## 2021-06-11 VITALS
DIASTOLIC BLOOD PRESSURE: 77 MMHG | RESPIRATION RATE: 18 BRPM | HEART RATE: 84 BPM | SYSTOLIC BLOOD PRESSURE: 126 MMHG | OXYGEN SATURATION: 97 %

## 2021-06-11 VITALS
HEART RATE: 72 BPM | OXYGEN SATURATION: 99 % | DIASTOLIC BLOOD PRESSURE: 72 MMHG | SYSTOLIC BLOOD PRESSURE: 128 MMHG | RESPIRATION RATE: 18 BRPM

## 2021-06-11 VITALS
HEART RATE: 79 BPM | OXYGEN SATURATION: 100 % | DIASTOLIC BLOOD PRESSURE: 87 MMHG | TEMPERATURE: 97.5 F | SYSTOLIC BLOOD PRESSURE: 138 MMHG | RESPIRATION RATE: 18 BRPM

## 2021-06-11 VITALS
HEART RATE: 92 BPM | DIASTOLIC BLOOD PRESSURE: 90 MMHG | OXYGEN SATURATION: 94 % | RESPIRATION RATE: 18 BRPM | SYSTOLIC BLOOD PRESSURE: 140 MMHG

## 2021-06-11 VITALS
SYSTOLIC BLOOD PRESSURE: 123 MMHG | OXYGEN SATURATION: 97 % | DIASTOLIC BLOOD PRESSURE: 86 MMHG | RESPIRATION RATE: 18 BRPM | HEART RATE: 79 BPM

## 2021-06-11 PROCEDURE — 96413 CHEMO IV INFUSION 1 HR: CPT

## 2021-06-11 PROCEDURE — 96415 CHEMO IV INFUSION ADDL HR: CPT

## 2021-06-11 PROCEDURE — 99072 ADDL SUPL MATRL&STAF TM PHE: CPT

## 2021-06-11 PROCEDURE — 96375 TX/PRO/DX INJ NEW DRUG ADDON: CPT

## 2021-06-11 RX ORDER — RITUXIMAB-PVVR 500 MG/50ML
500 INJECTION, SOLUTION INTRAVENOUS
Qty: 0 | Refills: 0 | Status: COMPLETED | OUTPATIENT
Start: 2021-06-10

## 2021-06-11 RX ORDER — ACETAMINOPHEN 500 MG/1
500 TABLET ORAL
Qty: 0 | Refills: 0 | Status: COMPLETED | OUTPATIENT
Start: 2021-06-10

## 2021-06-11 RX ORDER — DIPHENHYDRAMINE HYDROCHLORIDE 50 MG/ML
50 INJECTION, SOLUTION INTRAMUSCULAR; INTRAVENOUS
Qty: 1 | Refills: 0 | Status: COMPLETED | OUTPATIENT
Start: 2021-06-10

## 2021-06-11 RX ORDER — METHYLPREDNISOLONE 125 MG/2ML
125 INJECTION, POWDER, LYOPHILIZED, FOR SOLUTION INTRAMUSCULAR; INTRAVENOUS
Qty: 0 | Refills: 0 | Status: COMPLETED | OUTPATIENT
Start: 2021-06-10

## 2021-06-25 ENCOUNTER — APPOINTMENT (OUTPATIENT)
Dept: RHEUMATOLOGY | Facility: CLINIC | Age: 46
End: 2021-06-25
Payer: MEDICAID

## 2021-06-25 VITALS
TEMPERATURE: 97.2 F | DIASTOLIC BLOOD PRESSURE: 78 MMHG | OXYGEN SATURATION: 100 % | HEART RATE: 90 BPM | SYSTOLIC BLOOD PRESSURE: 116 MMHG | RESPIRATION RATE: 16 BRPM

## 2021-06-25 VITALS
RESPIRATION RATE: 18 BRPM | SYSTOLIC BLOOD PRESSURE: 118 MMHG | HEART RATE: 81 BPM | DIASTOLIC BLOOD PRESSURE: 70 MMHG | OXYGEN SATURATION: 99 % | TEMPERATURE: 97 F

## 2021-06-25 VITALS
SYSTOLIC BLOOD PRESSURE: 109 MMHG | OXYGEN SATURATION: 98 % | HEART RATE: 80 BPM | DIASTOLIC BLOOD PRESSURE: 73 MMHG | RESPIRATION RATE: 17 BRPM

## 2021-06-25 VITALS
DIASTOLIC BLOOD PRESSURE: 72 MMHG | HEART RATE: 74 BPM | RESPIRATION RATE: 16 BRPM | OXYGEN SATURATION: 98 % | SYSTOLIC BLOOD PRESSURE: 108 MMHG

## 2021-06-25 VITALS
SYSTOLIC BLOOD PRESSURE: 115 MMHG | OXYGEN SATURATION: 96 % | RESPIRATION RATE: 16 BRPM | HEART RATE: 75 BPM | DIASTOLIC BLOOD PRESSURE: 79 MMHG

## 2021-06-25 PROCEDURE — 96415 CHEMO IV INFUSION ADDL HR: CPT

## 2021-06-25 PROCEDURE — 99072 ADDL SUPL MATRL&STAF TM PHE: CPT

## 2021-06-25 PROCEDURE — 96413 CHEMO IV INFUSION 1 HR: CPT

## 2021-06-25 PROCEDURE — 96375 TX/PRO/DX INJ NEW DRUG ADDON: CPT

## 2021-06-25 RX ORDER — DIPHENHYDRAMINE HYDROCHLORIDE 50 MG/ML
50 INJECTION, SOLUTION INTRAMUSCULAR; INTRAVENOUS
Qty: 1 | Refills: 0 | Status: COMPLETED | OUTPATIENT
Start: 2021-06-19

## 2021-06-25 RX ORDER — RITUXIMAB-PVVR 500 MG/50ML
500 INJECTION, SOLUTION INTRAVENOUS
Qty: 0 | Refills: 0 | Status: COMPLETED | OUTPATIENT
Start: 2021-06-10

## 2021-06-25 RX ORDER — METHYLPREDNISOLONE 125 MG/2ML
125 INJECTION, POWDER, LYOPHILIZED, FOR SOLUTION INTRAMUSCULAR; INTRAVENOUS
Qty: 0 | Refills: 0 | Status: COMPLETED | OUTPATIENT
Start: 2021-06-19

## 2021-06-25 RX ORDER — ACETAMINOPHEN 500 MG/1
500 TABLET ORAL
Qty: 0 | Refills: 0 | Status: COMPLETED | OUTPATIENT
Start: 2021-06-19

## 2021-06-29 ENCOUNTER — NON-APPOINTMENT (OUTPATIENT)
Age: 46
End: 2021-06-29

## 2021-07-01 ENCOUNTER — OUTPATIENT (OUTPATIENT)
Dept: OUTPATIENT SERVICES | Facility: HOSPITAL | Age: 46
LOS: 1 days | Discharge: HOME | End: 2021-07-01

## 2021-07-01 ENCOUNTER — NON-APPOINTMENT (OUTPATIENT)
Age: 46
End: 2021-07-01

## 2021-07-01 DIAGNOSIS — C50.919 MALIGNANT NEOPLASM OF UNSPECIFIED SITE OF UNSPECIFIED FEMALE BREAST: ICD-10-CM

## 2021-07-02 ENCOUNTER — APPOINTMENT (OUTPATIENT)
Dept: HEMATOLOGY ONCOLOGY | Facility: CLINIC | Age: 46
End: 2021-07-02
Payer: MEDICAID

## 2021-07-02 ENCOUNTER — RESULT REVIEW (OUTPATIENT)
Age: 46
End: 2021-07-02

## 2021-07-02 VITALS
HEART RATE: 77 BPM | BODY MASS INDEX: 31.83 KG/M2 | OXYGEN SATURATION: 99 % | DIASTOLIC BLOOD PRESSURE: 85 MMHG | HEIGHT: 62 IN | RESPIRATION RATE: 16 BRPM | SYSTOLIC BLOOD PRESSURE: 132 MMHG | TEMPERATURE: 96.9 F | WEIGHT: 173 LBS

## 2021-07-02 LAB
APTT BLD: 25.8 SEC
BASOPHILS # BLD AUTO: 0 K/UL — SIGNIFICANT CHANGE UP (ref 0–0.2)
BASOPHILS NFR BLD AUTO: 0 % — SIGNIFICANT CHANGE UP (ref 0–2)
EOSINOPHIL # BLD AUTO: 0.05 K/UL — SIGNIFICANT CHANGE UP (ref 0–0.5)
EOSINOPHIL NFR BLD AUTO: 1 % — SIGNIFICANT CHANGE UP (ref 0–6)
HCT VFR BLD CALC: 35.8 % — SIGNIFICANT CHANGE UP (ref 34.5–45)
HGB BLD-MCNC: 11.8 G/DL — SIGNIFICANT CHANGE UP (ref 11.5–15.5)
INR PPP: 0.97 RATIO
LYMPHOCYTES # BLD AUTO: 1.15 K/UL — SIGNIFICANT CHANGE UP (ref 1–3.3)
LYMPHOCYTES # BLD AUTO: 24 % — SIGNIFICANT CHANGE UP (ref 13–44)
MCHC RBC-ENTMCNC: 32.9 PG — SIGNIFICANT CHANGE UP (ref 27–34)
MCHC RBC-ENTMCNC: 33 G/DL — SIGNIFICANT CHANGE UP (ref 32–36)
MCV RBC AUTO: 99.7 FL — SIGNIFICANT CHANGE UP (ref 80–100)
MONOCYTES # BLD AUTO: 0.24 K/UL — SIGNIFICANT CHANGE UP (ref 0–0.9)
MONOCYTES NFR BLD AUTO: 5 % — SIGNIFICANT CHANGE UP (ref 2–14)
NEUTROPHILS # BLD AUTO: 3.37 K/UL — SIGNIFICANT CHANGE UP (ref 1.8–7.4)
NEUTROPHILS NFR BLD AUTO: 70 % — SIGNIFICANT CHANGE UP (ref 43–77)
NRBC # BLD: 0 /100 — SIGNIFICANT CHANGE UP (ref 0–0)
NRBC # BLD: SIGNIFICANT CHANGE UP /100 WBCS (ref 0–0)
PLAT MORPH BLD: NORMAL — SIGNIFICANT CHANGE UP
PLATELET # BLD AUTO: 216 K/UL — SIGNIFICANT CHANGE UP (ref 150–400)
PT BLD: 11.5 SEC
RBC # BLD: 3.59 M/UL — LOW (ref 3.8–5.2)
RBC # FLD: 11.5 % — SIGNIFICANT CHANGE UP (ref 10.3–14.5)
RBC BLD AUTO: SIGNIFICANT CHANGE UP
WBC # BLD: 4.81 K/UL — SIGNIFICANT CHANGE UP (ref 3.8–10.5)
WBC # FLD AUTO: 4.81 K/UL — SIGNIFICANT CHANGE UP (ref 3.8–10.5)

## 2021-07-02 PROCEDURE — 99072 ADDL SUPL MATRL&STAF TM PHE: CPT

## 2021-07-02 PROCEDURE — 99214 OFFICE O/P EST MOD 30 MIN: CPT

## 2021-07-02 RX ORDER — HYDROCORTISONE 25 MG/G
2.5 OINTMENT TOPICAL
Qty: 1 | Refills: 0 | Status: DISCONTINUED | COMMUNITY
Start: 2020-12-29 | End: 2021-07-02

## 2021-07-02 RX ORDER — METHOTREXATE 2.5 MG/1
2.5 TABLET ORAL
Qty: 24 | Refills: 2 | Status: DISCONTINUED | COMMUNITY
Start: 2021-01-11 | End: 2021-07-02

## 2021-07-06 ENCOUNTER — TRANSCRIPTION ENCOUNTER (OUTPATIENT)
Age: 46
End: 2021-07-06

## 2021-07-06 NOTE — HISTORY OF PRESENT ILLNESS
[de-identified] : Ms. Flowers is a [place] female who on June 2, 2017 saw her gynecologist, Dr. Corrigan, as she had menses twice in the same month and subsequently felt tired. She was then referred for routine screening mammogram, which was performed on June 5, 2017 with the finding of a right breast mass with associated architectural distortion suspicious for malignancy with ultrasound-guided core biopsy recommended. The patient also consequently had a bilateral breast ultrasound on the same date with a finding of a hypoechoic mass with irregular margins and posterior shadowing at the 1 o'clock axis of the right breast corresponding to the findings on the mammogram with no further suspicious findings; ultrasound-guided core biopsy was recommended. She ultimately had an ultrasound-guided core biopsy performed on June 12, 2017 with a finding of invasive moderately differentiated duct carcinoma, with the largest focus of invasive carcinoma measuring up to 5 mm with evidence of DCIS, intermediate nuclear grade, solid and cribriform types, with no evidence of lymphovascular invasion, ER positive (77%), MT positive (83%), and HER-2/carmen (1+) and negative. The patient then went on to have a bilateral breast MRI with the right breast showing, within the upper inner quadrant, a spiculated enhancing mass corresponding to the biopsy-proven cancer measuring up to 2 cm in the mediolateral direction, and 1.7 cm in the anterior-posterior direction, and 1.5 cm in the craniocaudal direction; left breast showed, within the upper outer quadrant, a somewhat serpiginous area of enhancement with the more anterior aspect becoming more nodular and measuring up to 1.1 cm in size, with additional scattered nonspecific enhancing foci; the axillae were unremarkable. 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 The patient subsequently was seen with complaint of pain and burning sensation and a new "lump" at the left breast biopsy site with a comment from the radiologist of a small hematoma with surrounding ecchymosis present on July 22, 2017, with instructions for warm compresses and pressure. The patient ultimately saw Dr. Caroline Benítez and underwent a bilateral lumpectomy and reduction mammoplasty, which was performed on August 11, 2017, at [facility] with a finding in the right breast of an invasive ductal carcinoma, moderately differentiated, measuring 2 cm in greatest diameter, nuclear, Norton score 7/9, with evidence of DCIS and LCIS present in addition to fibrocystic changes with margins negative for carcinoma, with 0/3 sentinel lymph nodes involved with carcinoma; the left breast excisional biopsy showed evidence of ductal carcinoma in situ with microinvasion, with DCIS being of high nuclear grade, solid and comedo type with central necrosis, evidence of lobular carcinoma in situ, with margins of resection negative for DCIS and microinvasion. The patient returned to the operating room on October 13, 2017 for a left sentinel lymph node biopsy with a finding of 0/3 left sentinel lymph nodes involved with carcinoma. She subsequently had Oncotype DX testing sent off on her right breast carcinoma with a finding of a recurrence score of 7, correlating to an 8% risk of distant recurrence within 10 years should she take tamoxifen alone. The patient saw a medical oncologist, Dr. Maldonado, at [facility] who recommended adjuvant chemotherapy with CMF. She also had BRCA testing sent off, specifically a BRCA 1/2 [employer] 3-site mutation panel, which returned negative of note.\par \par She saw me in initial consultation in 11/17 and I recommended that she proceed with further genetic predisposition testing which returned negative for other known genetic predisposition mutations. Started Tamoxifen on October 24th 2017. She completed RT at [facility] () on 2/13/18. \par \par Disease: breast cancer  \par Pathology: right breast IDC; left breast DCIS \par TNM stage: T1, N0, M0 \par AJCC Stage: 1 \par \par In the past she had c/o increasing arthralgias and joint swelling. She followed up with her PCP and was found to have Lyme IgG Ab P23 s/p doxycycline 100mg BID x 21 days in the beginning of June 2019.  She was prescribed another 21 days but refused to take it due to side effects.  Since then, she reported improvement in her forgetfulness, lightheadedness, fatigue, hair thinning.  Fevers and night sweats resolved. Thyroid workup neg, mildly elevated TSH.  \par \par She was last seen in 7/19. In the interim she c/o gradually worsening arthralgias and body aches especially since 11/19. She saw a new PCP - Dr Grimm who then referred her for an ID consult, Dr Kaba, and had no serologic evidence of Lyme Dz, or other active infections. She then had a rheum consult with Dr. Charles, who assessed:\par \par "44 year old female presents with polyarticular joint pains and myalgias of unclear etiology. Some of her symptoms, including the distribution of joints (MCP's and PIP's), joint swelling, elevated CRP, and family history, are suggestive of rheumatoid arthritis, though other symptoms, including pain outside of the joints and lack of prolonged AM stiffness, are atypical. I have therefore ordered some more bloodwork and x-rays as further workup. She will follow up with me again in 2 weeks to review her results. In the meantime, I have also started her on a trial of low-dose prednisone."\par \par Pt reports prednisone did not lead to any improvement so she d/c'd it.  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 DDx also includes AE's from tamoxifen, though less likely.\par  - Rx naproxen 500mg BID.\par  - x-rays of L-spine, S-I joints.\par \par 44 year old female with polyarticular joint pains and low back pain and found to have (+)HLA-B27. 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 DDx also includes AE's from tamoxifen, though less likely.\par  - Rx naproxen 500mg BID.\par  - x-rays of L-spine, S-I joints.\par \par She ultimately was seen by Dr Charles (rheum). Found  CRP elevated, diffuse pain, undifferentiated spondyloarthropathy, inflammatory arthritis, muscle cramps, polyarticular joint pains and low back pain and found to have (+)HLA-B27. Presentation suggestive of undifferentiated spondyloarthropathy vs seronegative RA. MRI L-spine revealed degenerative changes / no evidence of spondylitis. DDx also included AE's from tamoxifen, though less likely. Symptoms had been improving on sulfasalazine, but then lost effect. Pt was recommended to start methotrexate. \par \par  \par  [de-identified] : Seen today for a f/u visit.. \par \par She c/o persistent intermittent joint swelling, worsening and debilitating arthralgias, low back pain, muscles aches and generalized fatigue. Sh ehas decreased work to 3 days / week as a result. \par \par Methotrexate was discontinued as it was not working per the pt. She is on Ruxience (6/13 an d6/25/21). Still no improvement yet.  \par \par She repeatedly described "every joint in my body hurts especially my pelvis". \par \par She is seen today more urgently following a call to state that she has large bruises on her legs. Today she mentions that these appeared after she was barbacue-ing / grilling and perhaps bumping into things while doing so.\par \par Menses remain regular although they are heavier.  \par \par Pt c/o increased weight over time. \par \par She denies any tamoxifen related side effects. \par \par 6/21 mammo/sono neg\par 1/21 MRI neg / benign findings.

## 2021-07-06 NOTE — REVIEW OF SYSTEMS
[Negative] : Psychiatric [Fever] : no fever [Chills] : no chills [Night Sweats] : no night sweats [Recent Change In Weight] : ~T no recent weight change [Abdominal Pain] : no abdominal pain [Vomiting] : no vomiting [Constipation] : no constipation [Diarrhea] : no diarrhea [FreeTextEntry2] : as above [FreeTextEntry9] : as above

## 2021-07-06 NOTE — PHYSICAL EXAM
[Fully active, able to carry on all pre-disease performance without restriction] : Status 0 - Fully active, able to carry on all pre-disease performance without restriction [Normal] : affect appropriate [de-identified] : s/p B/L LE and redn mammoplasty with well healed scars, no masses; B/L ax neg [de-identified] : pathcy areas B/L eternal things / shins with resolving ecchymoses.

## 2021-07-08 ENCOUNTER — APPOINTMENT (OUTPATIENT)
Dept: RHEUMATOLOGY | Facility: CLINIC | Age: 46
End: 2021-07-08
Payer: MEDICAID

## 2021-07-08 VITALS
TEMPERATURE: 97.5 F | BODY MASS INDEX: 31.83 KG/M2 | DIASTOLIC BLOOD PRESSURE: 78 MMHG | SYSTOLIC BLOOD PRESSURE: 120 MMHG | HEART RATE: 94 BPM | OXYGEN SATURATION: 99 % | WEIGHT: 173 LBS | RESPIRATION RATE: 17 BRPM | HEIGHT: 62 IN

## 2021-07-08 DIAGNOSIS — Z20.822 CONTACT WITH AND (SUSPECTED) EXPOSURE TO COVID-19: ICD-10-CM

## 2021-07-08 PROCEDURE — 99072 ADDL SUPL MATRL&STAF TM PHE: CPT

## 2021-07-08 PROCEDURE — 99215 OFFICE O/P EST HI 40 MIN: CPT | Mod: 25

## 2021-07-08 PROCEDURE — 36415 COLL VENOUS BLD VENIPUNCTURE: CPT

## 2021-07-08 NOTE — HISTORY OF PRESENT ILLNESS
[___ Month(s) Ago] : [unfilled] month(s) ago [Fatigue] : fatigue [Arthralgias] : arthralgias [Myalgias] : myalgias [FreeTextEntry1] : Still w/ diffuse joint pains, unchanged.  No improvement to date w/ rituximab.  SHe has been having difficulty sleeping due to the pain.  Also w/ intermittent swelling in her hands and feet.  (+)AM stiffness x at least 45 minutes.  \par  [Anorexia] : no anorexia [Weight Loss] : no weight loss [Malaise] : no malaise [Fever] : no fever [Chills] : no chills [Malar Facial Rash] : no malar facial rash [Skin Lesions] : no lesions [Skin Nodules] : no skin nodules [Oral Ulcers] : no oral ulcers [Cough] : no cough [Dry Mouth] : no dry mouth [Dysphonia] : no dysphonia [Dysphagia] : no dysphagia [Shortness of Breath] : no shortness of breath [Chest Pain] : no chest pain [Joint Swelling] : no joint swelling [Joint Warmth] : no joint warmth [Joint Deformity] : no joint deformity [Decreased ROM] : no decreased range of motion [Morning Stiffness] : no morning stiffness [Falls] : no falls [Difficulty Standing] : no difficulty standing [Difficulty Walking] : no difficulty walking [Dyspnea] : no dyspnea [Muscle Weakness] : no muscle weakness [Muscle Spasms] : no muscle spasms [Muscle Cramping] : no muscle cramping [Visual Changes] : no visual changes [Eye Pain] : no eye pain [Eye Redness] : no eye redness [Dry Eyes] : no dry eyes

## 2021-07-08 NOTE — ASSESSMENT
[FreeTextEntry1] : 45 year old female with polyarticular joint pains and low back pain and found to have (+)HLA-B27 - suggestive of undifferentiated spondyloarthropathy, though current distribution of affected joints (hands/feet) more c/w seronegative RA.  MRI L-spine reveals degenerative changes / no evidence of spondylitis.  MRI pelvis w/ no evidence of sacroiliitis.  DDx also includes AE's from tamoxifen, though less likely.  Symptoms had been improving on sulfasalazine, but it then lost effect.  Had mild improvement on MTX, but did not tolerate it (abd pain/diarrhea).  Pt also c/o frequent leg cramps.\par   - Start Otrexup 15mg weekly, folic acid 1mg daily..  \par   - Pt on rituximab - next dose due after 12/25/2021\par   - Hepatitis panel negative 6/20.  Quantiferon negative 4/2021.\par   - Flu vaccine (9/20, per pt), Prevnar (8/20) UTD.  Pt has received the COVID vaccine.\par   - Cont naproxen 500mg BID prn for now.\par   - Reiterated importance of exercise.\par   - warm compresses\par   - OTC topical analgesics.\par   - Check labs.\par

## 2021-07-09 ENCOUNTER — APPOINTMENT (OUTPATIENT)
Dept: FAMILY MEDICINE | Facility: CLINIC | Age: 46
End: 2021-07-09
Payer: MEDICAID

## 2021-07-09 ENCOUNTER — NON-APPOINTMENT (OUTPATIENT)
Age: 46
End: 2021-07-09

## 2021-07-09 VITALS
WEIGHT: 173 LBS | HEART RATE: 73 BPM | TEMPERATURE: 98.5 F | DIASTOLIC BLOOD PRESSURE: 62 MMHG | BODY MASS INDEX: 31.83 KG/M2 | OXYGEN SATURATION: 99 % | SYSTOLIC BLOOD PRESSURE: 115 MMHG | HEIGHT: 62 IN

## 2021-07-09 DIAGNOSIS — M79.10 MYALGIA, UNSPECIFIED SITE: ICD-10-CM

## 2021-07-09 LAB
ALBUMIN SERPL ELPH-MCNC: 4.3 G/DL
ALP BLD-CCNC: 61 U/L
ALT SERPL-CCNC: 11 U/L
ANION GAP SERPL CALC-SCNC: 13 MMOL/L
AST SERPL-CCNC: 15 U/L
BASOPHILS # BLD AUTO: 0.04 K/UL
BASOPHILS NFR BLD AUTO: 0.7 %
BILIRUB SERPL-MCNC: 0.2 MG/DL
BUN SERPL-MCNC: 16 MG/DL
CALCIUM SERPL-MCNC: 9.1 MG/DL
CHLORIDE SERPL-SCNC: 106 MMOL/L
CO2 SERPL-SCNC: 23 MMOL/L
COVID-19 NUCLEOCAPSID  GAM ANTIBODY INTERPRETATION: NEGATIVE
COVID-19 SPIKE DOMAIN ANTIBODY INTERPRETATION: POSITIVE
CREAT SERPL-MCNC: 0.7 MG/DL
CRP SERPL-MCNC: 11 MG/L
EOSINOPHIL # BLD AUTO: 0.06 K/UL
EOSINOPHIL NFR BLD AUTO: 1.1 %
ERYTHROCYTE [SEDIMENTATION RATE] IN BLOOD BY WESTERGREN METHOD: 15 MM/HR
GLUCOSE SERPL-MCNC: 121 MG/DL
HCT VFR BLD CALC: 38.4 %
HGB BLD-MCNC: 11.6 G/DL
IMM GRANULOCYTES NFR BLD AUTO: 0.2 %
LYMPHOCYTES # BLD AUTO: 0.76 K/UL
LYMPHOCYTES NFR BLD AUTO: 13.3 %
MAN DIFF?: NORMAL
MCHC RBC-ENTMCNC: 30.2 GM/DL
MCHC RBC-ENTMCNC: 31.4 PG
MCV RBC AUTO: 104.1 FL
MONOCYTES # BLD AUTO: 0.38 K/UL
MONOCYTES NFR BLD AUTO: 6.7 %
NEUTROPHILS # BLD AUTO: 4.45 K/UL
NEUTROPHILS NFR BLD AUTO: 78 %
PLATELET # BLD AUTO: 237 K/UL
POTASSIUM SERPL-SCNC: 3.9 MMOL/L
PROT SERPL-MCNC: 7.1 G/DL
RBC # BLD: 3.69 M/UL
RBC # FLD: 12.1 %
SARS-COV-2 AB SERPL IA-ACNC: 149 U/ML
SARS-COV-2 AB SERPL QL IA: 0.1 INDEX
SODIUM SERPL-SCNC: 142 MMOL/L
WBC # FLD AUTO: 5.7 K/UL

## 2021-07-09 PROCEDURE — 99072 ADDL SUPL MATRL&STAF TM PHE: CPT

## 2021-07-09 PROCEDURE — 99214 OFFICE O/P EST MOD 30 MIN: CPT

## 2021-07-09 NOTE — HISTORY OF PRESENT ILLNESS
[de-identified] : \par 45 year old female is here for a followup visit. Patient is here for medication renewals and for blood work discussion. Medications and allergies were reviewed and assessed.  There has been no new medications since the last visit.\par has bruising since starting new medications\par \par \par

## 2021-07-09 NOTE — ASSESSMENT
[FreeTextEntry1] : bruising\par since starting new rheum injectable medication\par reviewed labs with patient\par at this point suggest giving new medication time, return to office for repeat bw in 2-3 weeks, any increase in symtpoms, ER\par reviewed notes from rheum\par \par post lyme syndrome\par patient was treated for 2 months of doxy\par still symptomatic - refer to rheum and id\par seeing rheum, on meds, on new medications\par \par breast cancer\par resolved, on tamoxifen, follows with breast surgeon and onc\par \par

## 2021-07-09 NOTE — HEALTH RISK ASSESSMENT
[] : No [Yes] : Yes [No falls in past year] : Patient reported no falls in the past year [0] : 2) Feeling down, depressed, or hopeless: Not at all (0) [[id number]] : 0 [Very Good] : ~his/her~  mood as very good [Patient reported mammogram was normal] : Patient reported mammogram was normal [Employed] : employed [Student] : student [] :  [# Of Children ___] : has [unfilled] children [Fully functional (bathing, dressing, toileting, transferring, walking, feeding)] : Fully functional (bathing, dressing, toileting, transferring, walking, feeding) [Fully functional (using the telephone, shopping, preparing meals, housekeeping, doing laundry, using] : Fully functional and needs no help or supervision to perform IADLs (using the telephone, shopping, preparing meals, housekeeping, doing laundry, using transportation, managing medications and managing finances) [MammogramDate] : 2019 [MammogramComments] : s/p breast cancer [de-identified] : works part time and goes to school for us [FreeTextEntry3] : getting

## 2021-07-16 ENCOUNTER — NON-APPOINTMENT (OUTPATIENT)
Age: 46
End: 2021-07-16

## 2021-07-16 ENCOUNTER — APPOINTMENT (OUTPATIENT)
Dept: BREAST CENTER | Facility: CLINIC | Age: 46
End: 2021-07-16
Payer: MEDICAID

## 2021-07-16 VITALS
TEMPERATURE: 97.7 F | BODY MASS INDEX: 31.83 KG/M2 | HEART RATE: 75 BPM | WEIGHT: 173 LBS | HEIGHT: 62 IN | SYSTOLIC BLOOD PRESSURE: 122 MMHG | DIASTOLIC BLOOD PRESSURE: 84 MMHG

## 2021-07-16 PROCEDURE — 99072 ADDL SUPL MATRL&STAF TM PHE: CPT

## 2021-07-16 PROCEDURE — 99214 OFFICE O/P EST MOD 30 MIN: CPT | Mod: 25

## 2021-07-16 PROCEDURE — 93702 BIS XTRACELL FLUID ANALYSIS: CPT

## 2021-07-16 NOTE — DATA REVIEWED
[FreeTextEntry1] : 6/30/21, ZP, Bilat sMMG: het dense, no sup findings, biozorb in places and surgical clips in axilla, rec U/S for dense breasts and mmg 1 year, BR2\par 6/30/21, ZP, Bilat U/S: R post surg changes 1:00 w/o assoc mass, no susp findings, L post sutg changes several nodes ranging up to 1.2cm stable, subcentimeter cyst present RA region, no susp findings, no adenopathy bilat, BR2

## 2021-07-16 NOTE — PHYSICAL EXAM
[Normocephalic] : normocephalic [Atraumatic] : atraumatic [Supple] : supple [No Supraclavicular Adenopathy] : no supraclavicular adenopathy [No Thyromegaly] : no thyromegaly [Examined in the supine and seated position] : examined in the supine and seated position [No dominant masses] : no dominant masses in right breast  [No dominant masses] : no dominant masses left breast [No Nipple Retraction] : no left nipple retraction [No Nipple Discharge] : no left nipple discharge [No Axillary Lymphadenopathy] : no left axillary lymphadenopathy [No Edema] : no edema [No Swelling] : no swelling [No Rashes] : no rashes [No Ulceration] : no ulceration [Asymmetrical] : asymmetrical [de-identified] : Bilat reduction scars. Excellent cosmesis. Biozorb no longer palpable [de-identified] : limited ROM in bilat shoulder ~ 80%

## 2021-07-16 NOTE — HISTORY OF PRESENT ILLNESS
[FreeTextEntry1] : 44 y/o BRCA neg female here for f.u, hx of bilat BCT, pt is 4 years out. Pt denies any breast lesions, discharge or masses. Occasionally feels the left seroma. \par 8/11/17 S/p bilat BCS with R SLN, bilat breast reduction, oncoplastic surgery and biozorb insertion on for RIGHT UIQ [id number] Infiltrating ductal Cancer, margins 1.2 cm, Gr 2. LN 1/4, ER/WA positive and Qxz5ong neg. Oncotype was 7. LEFT was pT1mic, N0, Margins neg at 5 mm. ER+/WA neg and Her2 neg. s/p L SLNBx due to microinvasion on final path 10/13/17. \par \par Reporting bilateral tenderness/pain to axilla and R upper inner breasts, mostly when it rains. She feels more stressed currently due to the amount of doctor visits. \par Is currently followed by Rheumatology () for arthralgia/ ? Lymes Disease. Occasional swelling to hands. \par \par Sees medical oncologist: Wade Alejo, Started tamoxifen 10/24/17. Still cycling monthly, however this month has 2 cycles. Has new GYN, Dr. Yumi Miller and saw 10/7/20.\par Finished EBRT 2/18/18 Dr. Nogueira rad onc. Discharged plastic surgery Dr. Gatica.\par \par 1/8/21, ZP, MRI: bilat scattered nonspecific enhancing foci, post surg changes, no susp findings, no significant adenopathy, continued bilat MRI f/u rec in 1 year, BR1\par 6/30/21, ZP, Bilat sMMG: het dense, no sup findings, biozorb in places and surgical clips in axilla, rec U/S for dense breasts and mmg 1 year, BR2\par 6/30/21, ZP, Bilat U/S: R post surg changes 1:00 w/o assoc mass, no susp findings, L post sutg changes several nodes ranging up to 1.2cm stable, subcentimeter cyst present RA region, no susp findings, no adenopathy bilat, BR2\par

## 2021-07-16 NOTE — ASSESSMENT
[FreeTextEntry1] : 46 y/o BRCA neg female here for f.u, hx of bilat BCT, pt is 4 years out. Pt denies any breast lesions, discharge or masses. Occasionally feels the left seroma. \par 8/11/17 S/p bilat BCS with R SLN, bilat breast reduction, oncoplastic surgery and biozorb insertion on for RIGHT UIQ [id number] Infiltrating ductal Cancer, margins 1.2 cm, Gr 2. LN 1/4, ER/PA positive and Ock2ngu neg. Oncotype was 7. LEFT was pT1mic, N0, Margins neg at 5 mm. ER+/PA neg and Her2 neg. s/p L SLNBx due to microinvasion on final path 10/13/17. \par \par Reporting bilateral tenderness/pain to axilla and R upper inner breasts, mostly when it rains. She feels more stressed currently due to the amount of doctor visits. \par Is currently followed by Rheumatology () for arthralgia/ ? Lymes Disease. Occasional swelling to hands. \par \par Sees medical oncologist: Wade Alejo, Started tamoxifen 10/24/17. Still cycling monthly, however this month has 2 cycles. Has new GYN, Dr. Yumi Miller and saw 10/7/20.\par Finished EBRT 2/18/18 Dr. Nogueira rad onc. Discharged plastic surgery Dr. Gatica.\par \par 1/8/21, ZP, MRI: bilat scattered nonspecific enhancing foci, post surg changes, no susp findings, no significant adenopathy, continued bilat MRI f/u rec in 1 year, BR1\par 6/30/21, ZP, Bilat sMMG: het dense, no sup findings, biozorb in places and surgical clips in axilla, rec U/S for dense breasts and mmg 1 year, BR2\par 6/30/21, ZP, Bilat U/S: R post surg changes 1:00 w/o assoc mass, no susp findings, L post sutg changes several nodes ranging up to 1.2cm stable, subcentimeter cyst present RA region, no susp findings, no adenopathy bilat, BR2\par \par CBE: Bilat reduction scars. Excellent cosmesis. Biozorb no longer palpable. No adenopathy. CORIE\par ROM limited at shoulders. No lymphedema. \par Pt states does have some puffiness in arms on occasion. \par Reviewed recent studies, no suspicious findings on recent screening.. Discussed stress reducing activities such as gardening, meditation, aromatherapy. Also discussed tylenol/motrin PRN. SOZO done today. Pt also has RA, Will refer to PT. Also PMNR for Dr. Dugan contact given.

## 2021-07-28 ENCOUNTER — APPOINTMENT (OUTPATIENT)
Dept: HEMATOLOGY ONCOLOGY | Facility: CLINIC | Age: 46
End: 2021-07-28

## 2021-08-09 RX ORDER — METHOTREXATE 15 MG/.4ML
15 INJECTION, SOLUTION SUBCUTANEOUS
Qty: 4 | Refills: 2 | Status: DISCONTINUED | COMMUNITY
Start: 2021-07-08 | End: 2021-08-09

## 2021-08-16 ENCOUNTER — RX RENEWAL (OUTPATIENT)
Age: 46
End: 2021-08-16

## 2021-08-30 ENCOUNTER — APPOINTMENT (OUTPATIENT)
Dept: RHEUMATOLOGY | Facility: CLINIC | Age: 46
End: 2021-08-30
Payer: MEDICAID

## 2021-08-30 VITALS
RESPIRATION RATE: 16 BRPM | DIASTOLIC BLOOD PRESSURE: 83 MMHG | WEIGHT: 171 LBS | BODY MASS INDEX: 31.47 KG/M2 | HEIGHT: 62 IN | OXYGEN SATURATION: 99 % | SYSTOLIC BLOOD PRESSURE: 129 MMHG | HEART RATE: 86 BPM | TEMPERATURE: 97.8 F

## 2021-08-30 PROCEDURE — 99214 OFFICE O/P EST MOD 30 MIN: CPT

## 2021-08-30 PROCEDURE — 36415 COLL VENOUS BLD VENIPUNCTURE: CPT

## 2021-08-30 NOTE — HISTORY OF PRESENT ILLNESS
[___ Month(s) Ago] : [unfilled] month(s) ago [Fatigue] : fatigue [Arthralgias] : arthralgias [Myalgias] : myalgias [FreeTextEntry1] : Continues to experience diffuse joint pains, worst in her hands, feet, knees and shoulders - worse since last visit.  No improvement to date w/ rituximab.  She has been having difficulty sleeping due to the pain.  Also w/ intermittent swelling in her hands and feet.  Still w/(+)AM stiffness x at least 45 minutes.  \par  [Anorexia] : no anorexia [Weight Loss] : no weight loss [Malaise] : no malaise [Fever] : no fever [Chills] : no chills [Malar Facial Rash] : no malar facial rash [Skin Lesions] : no lesions [Skin Nodules] : no skin nodules [Oral Ulcers] : no oral ulcers [Cough] : no cough [Dry Mouth] : no dry mouth [Dysphagia] : no dysphagia [Dysphonia] : no dysphonia [Shortness of Breath] : no shortness of breath [Chest Pain] : no chest pain [Joint Swelling] : no joint swelling [Joint Warmth] : no joint warmth [Joint Deformity] : no joint deformity [Morning Stiffness] : no morning stiffness [Decreased ROM] : no decreased range of motion [Falls] : no falls [Difficulty Standing] : no difficulty standing [Difficulty Walking] : no difficulty walking [Dyspnea] : no dyspnea [Muscle Weakness] : no muscle weakness [Muscle Spasms] : no muscle spasms [Muscle Cramping] : no muscle cramping [Visual Changes] : no visual changes [Eye Pain] : no eye pain [Eye Redness] : no eye redness [Dry Eyes] : no dry eyes

## 2021-08-30 NOTE — ASSESSMENT
[FreeTextEntry1] : 45 year old female with polyarticular joint pains and low back pain and found to have (+)HLA-B27 - suggestive of undifferentiated spondyloarthropathy, though current distribution of affected joints (hands/feet) more c/w seronegative RA.  MRI L-spine reveals degenerative changes / no evidence of spondylitis.  MRI pelvis w/ no evidence of sacroiliitis.  DDx also includes AE's from tamoxifen, though less likely.  Symptoms had been improving on sulfasalazine, but it then lost effect.  Had mild improvement on MTX, but did not tolerate it (abd pain/diarrhea). Currently w/ severe pain despite rituximab + MTX SC (Rosuvo).  ?pain at least partially due to tamoxifen.  Pt also c/o frequent leg cramps.\par   - Cont Rosuvo 15mg weekly, folic acid 1mg daily..  \par   - Pt on rituximab - next dose due after 12/25/2021\par   - Will plan to speak w/ her oncologist re: temporarily holding tamoxifen and monitor for improvement of pain.\par   - Hepatitis panel negative 6/20.  Quantiferon negative 4/2021.\par   - Flu vaccine (9/20, per pt), Prevnar (8/20) UTD.  Pt has received the COVID19 vaccine.  As pt is immunocompromised, recommended that she get the booster dose, as per CDC recommendations.\par   - Cont naproxen 500mg BID prn for now.\par   - Reiterated importance of exercise.\par   - warm compresses\par   - OTC topical analgesics.\par   - Check labs.\par

## 2021-08-30 NOTE — PHYSICAL EXAM
[General Appearance - Alert] : alert [General Appearance - In No Acute Distress] : in no acute distress [Sclera] : the sclera and conjunctiva were normal [Outer Ear] : the ears and nose were normal in appearance [Oropharynx] : the oropharynx was normal [Neck Appearance] : the appearance of the neck was normal [Neck Cervical Mass (___cm)] : no neck mass was observed [Jugular Venous Distention Increased] : there was no jugular-venous distention [Auscultation Breath Sounds / Voice Sounds] : lungs were clear to auscultation bilaterally [Heart Rate And Rhythm] : heart rate was normal and rhythm regular [Heart Sounds] : normal S1 and S2 [Heart Sounds Gallop] : no gallops [Murmurs] : no murmurs [Heart Sounds Pericardial Friction Rub] : no pericardial rub [Edema] : there was no peripheral edema [Bowel Sounds] : normal bowel sounds [Abdomen Soft] : soft [Abdomen Tenderness] : non-tender [Abdomen Mass (___ Cm)] : no abdominal mass palpated [Cervical Lymph Nodes Enlarged Posterior Bilaterally] : posterior cervical [Cervical Lymph Nodes Enlarged Anterior Bilaterally] : anterior cervical [Supraclavicular Lymph Nodes Enlarged Bilaterally] : supraclavicular [No Spinal Tenderness] : no spinal tenderness [Skin Color & Pigmentation] : normal skin color and pigmentation [Skin Turgor] : normal skin turgor [] : no rash [No Focal Deficits] : no focal deficits [Oriented To Time, Place, And Person] : oriented to person, place, and time [Impaired Insight] : insight and judgment were intact [Affect] : the affect was normal [FreeTextEntry1] : (+)tenderness in B/L 2nd-5th PIP's (R>L), 1st-5th MCP's (R>L), B/L wrists (R>L), B/L ankles., B/L feet (MTP squeeze);  B/L shoulders w/ pain upon abduction and internal rotation

## 2021-09-03 ENCOUNTER — NON-APPOINTMENT (OUTPATIENT)
Age: 46
End: 2021-09-03

## 2021-09-07 ENCOUNTER — LABORATORY RESULT (OUTPATIENT)
Age: 46
End: 2021-09-07

## 2021-09-22 ENCOUNTER — TRANSCRIPTION ENCOUNTER (OUTPATIENT)
Age: 46
End: 2021-09-22

## 2021-10-12 ENCOUNTER — APPOINTMENT (OUTPATIENT)
Dept: RHEUMATOLOGY | Facility: CLINIC | Age: 46
End: 2021-10-12
Payer: MEDICAID

## 2021-10-12 VITALS
RESPIRATION RATE: 17 BRPM | OXYGEN SATURATION: 98 % | TEMPERATURE: 97.7 F | BODY MASS INDEX: 31.1 KG/M2 | DIASTOLIC BLOOD PRESSURE: 84 MMHG | WEIGHT: 169 LBS | HEART RATE: 96 BPM | SYSTOLIC BLOOD PRESSURE: 140 MMHG | HEIGHT: 62 IN

## 2021-10-12 DIAGNOSIS — Z23 ENCOUNTER FOR IMMUNIZATION: ICD-10-CM

## 2021-10-12 PROCEDURE — 99215 OFFICE O/P EST HI 40 MIN: CPT | Mod: 25

## 2021-10-12 PROCEDURE — 90472 IMMUNIZATION ADMIN EACH ADD: CPT

## 2021-10-12 PROCEDURE — 90732 PPSV23 VACC 2 YRS+ SUBQ/IM: CPT

## 2021-10-12 PROCEDURE — G0009: CPT

## 2021-10-12 PROCEDURE — 90686 IIV4 VACC NO PRSV 0.5 ML IM: CPT

## 2021-10-12 RX ORDER — METHOTREXATE 15 MG/.3ML
15 INJECTION, SOLUTION SUBCUTANEOUS
Qty: 4 | Refills: 3 | Status: DISCONTINUED | COMMUNITY
Start: 2021-08-09 | End: 2021-10-12

## 2021-10-12 NOTE — ASSESSMENT
[FreeTextEntry1] : 45 year old female with polyarticular joint pains and low back pain and found to have (+)HLA-B27 - suggestive of undifferentiated spondyloarthropathy, though current distribution of affected joints (hands/feet) more c/w seronegative RA.  MRI L-spine reveals degenerative changes / no evidence of spondylitis.  MRI pelvis w/ no evidence of sacroiliitis.  Symptoms had been improving on sulfasalazine, but it then lost effect.  Had mild improvement on MTX, but did not tolerate it (abd pain/diarrhea). Currently w/ severe pain despite rituximab + MTX SC (Rasuvo).  Pt also c/o frequent leg cramps.\par   - Increase Rasuvo to 20mg weekly x 2 weeks, then 25mg SC q2 weeks, cont folic acid 1mg daily..  \par   - Pt on rituximab - next dose due after 12/25/2021\par   - Hepatitis panel negative 6/20.  Quantiferon negative 4/2021.\par   - Administered flu vaccine and Pneumovax.  Prevnar (8/20) UTD.  Pt has received the COVID19 vaccine + booster.\par   - Cont naproxen 500mg BID prn for now.\par   - Reiterated importance of exercise.\par   - warm compresses\par   - OTC topical analgesics.\par   - Check labs.\par

## 2021-10-12 NOTE — HISTORY OF PRESENT ILLNESS
[___ Month(s) Ago] : [unfilled] month(s) ago [Fatigue] : fatigue [Arthralgias] : arthralgias [Myalgias] : myalgias [FreeTextEntry1] : Still w/ diffuse joint pains, currently worst in her knees and shoulders, though also still w/ pain in her hands and feet, as well as in her lower back. No improvement to date w/ stopping tamoxifen.  She continues to experience difficulty sleeping due to the pain. She gets some relief from naproxen.  Also still w/ intermittent swelling in her hands and feet.  Still w/(+)AM stiffness x at least 45-60 minutes.  Also still w/ LBP, unchanged.\par  [Anorexia] : no anorexia [Weight Loss] : no weight loss [Malaise] : no malaise [Fever] : no fever [Chills] : no chills [Malar Facial Rash] : no malar facial rash [Skin Lesions] : no lesions [Skin Nodules] : no skin nodules [Oral Ulcers] : no oral ulcers [Cough] : no cough [Dry Mouth] : no dry mouth [Dysphonia] : no dysphonia [Dysphagia] : no dysphagia [Shortness of Breath] : no shortness of breath [Chest Pain] : no chest pain [Joint Swelling] : no joint swelling [Joint Warmth] : no joint warmth [Joint Deformity] : no joint deformity [Decreased ROM] : no decreased range of motion [Morning Stiffness] : no morning stiffness [Falls] : no falls [Difficulty Standing] : no difficulty standing [Difficulty Walking] : no difficulty walking [Dyspnea] : no dyspnea [Muscle Weakness] : no muscle weakness [Muscle Spasms] : no muscle spasms [Muscle Cramping] : no muscle cramping [Visual Changes] : no visual changes [Eye Pain] : no eye pain [Eye Redness] : no eye redness [Dry Eyes] : no dry eyes

## 2021-11-16 ENCOUNTER — APPOINTMENT (OUTPATIENT)
Dept: RHEUMATOLOGY | Facility: CLINIC | Age: 46
End: 2021-11-16
Payer: MEDICAID

## 2021-11-16 PROCEDURE — 36415 COLL VENOUS BLD VENIPUNCTURE: CPT

## 2021-11-16 PROCEDURE — 99215 OFFICE O/P EST HI 40 MIN: CPT | Mod: 25

## 2021-11-16 NOTE — HISTORY OF PRESENT ILLNESS
[___ Month(s) Ago] : [unfilled] month(s) ago [Fatigue] : fatigue [Arthralgias] : arthralgias [Myalgias] : myalgias [FreeTextEntry1] : Joint pains, including her hands and knees have significantly improved w/ increasing Otrexup to 20mg weekly.  Still w/ pain in her shoulders, elbows, and ankles though all are R>>L.  Also still w/ lower back pain, unchanged.  (+)occasional swelling in her feet.  No new complaints. [Anorexia] : no anorexia [Weight Loss] : no weight loss [Malaise] : no malaise [Fever] : no fever [Chills] : no chills [Malar Facial Rash] : no malar facial rash [Skin Lesions] : no lesions [Skin Nodules] : no skin nodules [Oral Ulcers] : no oral ulcers [Cough] : no cough [Dry Mouth] : no dry mouth [Dysphonia] : no dysphonia [Dysphagia] : no dysphagia [Shortness of Breath] : no shortness of breath [Chest Pain] : no chest pain [Joint Swelling] : no joint swelling [Joint Warmth] : no joint warmth [Joint Deformity] : no joint deformity [Decreased ROM] : no decreased range of motion [Morning Stiffness] : no morning stiffness [Falls] : no falls [Difficulty Standing] : no difficulty standing [Difficulty Walking] : no difficulty walking [Dyspnea] : no dyspnea [Muscle Weakness] : no muscle weakness [Muscle Spasms] : no muscle spasms [Muscle Cramping] : no muscle cramping [Visual Changes] : no visual changes [Eye Pain] : no eye pain [Eye Redness] : no eye redness [Dry Eyes] : no dry eyes

## 2021-11-16 NOTE — PHYSICAL EXAM
[General Appearance - Alert] : alert [General Appearance - In No Acute Distress] : in no acute distress [Sclera] : the sclera and conjunctiva were normal [Outer Ear] : the ears and nose were normal in appearance [Oropharynx] : the oropharynx was normal [Neck Appearance] : the appearance of the neck was normal [Neck Cervical Mass (___cm)] : no neck mass was observed [Jugular Venous Distention Increased] : there was no jugular-venous distention [Auscultation Breath Sounds / Voice Sounds] : lungs were clear to auscultation bilaterally [Heart Rate And Rhythm] : heart rate was normal and rhythm regular [Heart Sounds] : normal S1 and S2 [Heart Sounds Gallop] : no gallops [Murmurs] : no murmurs [Heart Sounds Pericardial Friction Rub] : no pericardial rub [Bowel Sounds] : normal bowel sounds [Edema] : there was no peripheral edema [Abdomen Soft] : soft [Abdomen Tenderness] : non-tender [Abdomen Mass (___ Cm)] : no abdominal mass palpated [Cervical Lymph Nodes Enlarged Posterior Bilaterally] : posterior cervical [Cervical Lymph Nodes Enlarged Anterior Bilaterally] : anterior cervical [Supraclavicular Lymph Nodes Enlarged Bilaterally] : supraclavicular [No Spinal Tenderness] : no spinal tenderness [Skin Color & Pigmentation] : normal skin color and pigmentation [Skin Turgor] : normal skin turgor [] : no rash [No Focal Deficits] : no focal deficits [Oriented To Time, Place, And Person] : oriented to person, place, and time [Impaired Insight] : insight and judgment were intact [Affect] : the affect was normal [FreeTextEntry1] : (+)tenderness in B/L wrists (R>L), B/L ankles., B/L feet (MTP squeeze);  right shoulder w/ pain upon abduction and internal rotation;  (+)tenderness in her B/L ankles and B/L feet (MTP squeeze)

## 2021-11-16 NOTE — ASSESSMENT
[FreeTextEntry1] : 45 year old female with polyarticular joint pains and low back pain and found to have (+)HLA-B27 - suggestive of undifferentiated spondyloarthropathy, though current distribution of affected joints (hands/feet) more c/w seronegative RA.  Previous MRI L-spine reveals degenerative changes / no evidence of spondylitis.  MRI pelvis w/ no evidence of sacroiliitis.  Symptoms had been improving on sulfasalazine, but it then lost effect.  Had mild improvement on MTX, but did not tolerate it (abd pain/diarrhea). Currently improved overall on rituximab + MTX SC (Rasuvo) though still w/ joint pains (R>L).  Pt also c/o frequent leg cramps.\par   - Increase Rasuvo to 25mg SC weekly, cont folic acid 1mg daily..  \par   - Pt on rituximab - next dose due after 12/25/2021\par   - Hepatitis panel negative 6/20.  Quantiferon negative 4/2021.\par   - Flu vaccine (10/21), Pneumovax (10/21), Prevnar (8/20) UTD.  Pt has received the COVID19 vaccine + booster.\par   - Cont naproxen 500mg BID prn for now.\par   - Reiterated importance of exercise.\par   - warm compresses\par   - OTC topical analgesics.\par   - Check labs.\par   - x-rays.  If x-rays of L-spine non-diagnostic, will plan to order MRI.

## 2021-11-17 LAB
ALBUMIN SERPL ELPH-MCNC: 4.5 G/DL
ALP BLD-CCNC: 69 U/L
ALT SERPL-CCNC: 9 U/L
ANION GAP SERPL CALC-SCNC: 10 MMOL/L
AST SERPL-CCNC: 13 U/L
BASOPHILS # BLD AUTO: 0.05 K/UL
BASOPHILS NFR BLD AUTO: 1.2 %
BILIRUB SERPL-MCNC: 0.2 MG/DL
BUN SERPL-MCNC: 14 MG/DL
CALCIUM SERPL-MCNC: 9.5 MG/DL
CHLORIDE SERPL-SCNC: 103 MMOL/L
CO2 SERPL-SCNC: 25 MMOL/L
CREAT SERPL-MCNC: 0.71 MG/DL
CRP SERPL-MCNC: 10 MG/L
EOSINOPHIL # BLD AUTO: 0.1 K/UL
EOSINOPHIL NFR BLD AUTO: 2.3 %
ERYTHROCYTE [SEDIMENTATION RATE] IN BLOOD BY WESTERGREN METHOD: 46 MM/HR
GLUCOSE SERPL-MCNC: 101 MG/DL
HCT VFR BLD CALC: 37.9 %
HGB BLD-MCNC: 12.5 G/DL
IMM GRANULOCYTES NFR BLD AUTO: 0.2 %
LYMPHOCYTES # BLD AUTO: 0.73 K/UL
LYMPHOCYTES NFR BLD AUTO: 17 %
MAN DIFF?: NORMAL
MCHC RBC-ENTMCNC: 33 GM/DL
MCHC RBC-ENTMCNC: 33.8 PG
MCV RBC AUTO: 102.4 FL
MONOCYTES # BLD AUTO: 0.36 K/UL
MONOCYTES NFR BLD AUTO: 8.4 %
NEUTROPHILS # BLD AUTO: 3.05 K/UL
NEUTROPHILS NFR BLD AUTO: 70.9 %
PLATELET # BLD AUTO: 264 K/UL
POTASSIUM SERPL-SCNC: 4.8 MMOL/L
PROT SERPL-MCNC: 7.7 G/DL
RBC # BLD: 3.7 M/UL
RBC # FLD: 12.8 %
SODIUM SERPL-SCNC: 139 MMOL/L
WBC # FLD AUTO: 4.3 K/UL

## 2021-11-18 LAB
HBV CORE IGG+IGM SER QL: NONREACTIVE
HBV SURFACE AB SER QL: NONREACTIVE
HBV SURFACE AG SER QL: NONREACTIVE
HCV AB SER QL: NONREACTIVE
HCV S/CO RATIO: 0.15 S/CO

## 2021-12-02 RX ORDER — RITUXIMAB-PVVR 500 MG/50ML
500 INJECTION, SOLUTION INTRAVENOUS
Qty: 2 | Refills: 1 | Status: DISCONTINUED | COMMUNITY
Start: 2021-05-13 | End: 2021-12-02

## 2021-12-02 RX ORDER — METHOTREXATE 25 MG/.5ML
25 INJECTION, SOLUTION SUBCUTANEOUS
Qty: 4 | Refills: 3 | Status: DISCONTINUED | COMMUNITY
Start: 2021-10-26 | End: 2021-12-02

## 2021-12-06 ENCOUNTER — RX RENEWAL (OUTPATIENT)
Age: 46
End: 2021-12-06

## 2021-12-10 ENCOUNTER — APPOINTMENT (OUTPATIENT)
Dept: FAMILY MEDICINE | Facility: CLINIC | Age: 46
End: 2021-12-10
Payer: MEDICAID

## 2021-12-10 VITALS
SYSTOLIC BLOOD PRESSURE: 115 MMHG | DIASTOLIC BLOOD PRESSURE: 82 MMHG | HEART RATE: 84 BPM | BODY MASS INDEX: 31.1 KG/M2 | HEIGHT: 62 IN | OXYGEN SATURATION: 98 % | WEIGHT: 169 LBS | TEMPERATURE: 97.5 F

## 2021-12-10 PROCEDURE — 99213 OFFICE O/P EST LOW 20 MIN: CPT

## 2021-12-10 RX ORDER — PREDNISONE 10 MG/1
10 TABLET ORAL
Qty: 7 | Refills: 0 | Status: DISCONTINUED | COMMUNITY
Start: 2021-09-24 | End: 2021-12-10

## 2021-12-10 NOTE — PHYSICAL EXAM
[Well Nourished] : well nourished [Well Developed] : well developed [Clear to Auscultation] : lungs were clear to auscultation bilaterally [Regular Rhythm] : with a regular rhythm [Normal S1, S2] : normal S1 and S2 [Normal Gait] : normal gait [Normal Affect] : the affect was normal [Normal Insight/Judgement] : insight and judgment were intact [de-identified] : llq tenderness

## 2021-12-10 NOTE — HISTORY OF PRESENT ILLNESS
[de-identified] : 46 year old female here with complaints of bloating, change in bowl size and pain llq. Patients active medications, allergies and issues were all reviewed with the patient at time of visit.\par \par \par \par

## 2021-12-10 NOTE — REVIEW OF SYSTEMS
[Abdominal Pain] : abdominal pain [Joint Pain] : joint pain [Muscle Pain] : muscle pain [Negative] : Heme/Lymph [FreeTextEntry9] : chronic

## 2021-12-10 NOTE — HEALTH RISK ASSESSMENT
[] : No [Yes] : Yes [No falls in past year] : Patient reported no falls in the past year [0] : 2) Feeling down, depressed, or hopeless: Not at all (0) [[id number]] : 0 [Very Good] : ~his/her~  mood as very good [Patient reported mammogram was normal] : Patient reported mammogram was normal [Employed] : employed [Student] : student [] :  [# Of Children ___] : has [unfilled] children [Fully functional (bathing, dressing, toileting, transferring, walking, feeding)] : Fully functional (bathing, dressing, toileting, transferring, walking, feeding) [Fully functional (using the telephone, shopping, preparing meals, housekeeping, doing laundry, using] : Fully functional and needs no help or supervision to perform IADLs (using the telephone, shopping, preparing meals, housekeeping, doing laundry, using transportation, managing medications and managing finances) [MammogramDate] : 2019 [MammogramComments] : s/p breast cancer [de-identified] : works part time and goes to school for us [FreeTextEntry3] : getting

## 2021-12-10 NOTE — ASSESSMENT
[FreeTextEntry1] : abdominal pain\par patient with llq pain with bloating and change in bowl habits\par patient will get ct of abdomen\par gastro eval\par \par \par post lyme syndrome\par patient was treated for 2 months of doxy\par still symptomatic - refer to rheum and id\par seeing rheum, on meds, on new medications\par \par breast cancer\par resolved, on tamoxifen, follows with breast surgeon and onc\par \par

## 2021-12-28 ENCOUNTER — APPOINTMENT (OUTPATIENT)
Dept: OBGYN | Facility: CLINIC | Age: 46
End: 2021-12-28
Payer: MEDICAID

## 2021-12-28 ENCOUNTER — ASOB RESULT (OUTPATIENT)
Age: 46
End: 2021-12-28

## 2021-12-28 PROCEDURE — 99214 OFFICE O/P EST MOD 30 MIN: CPT | Mod: 95

## 2021-12-28 PROCEDURE — 76830 TRANSVAGINAL US NON-OB: CPT

## 2021-12-28 NOTE — REVIEW OF SYSTEMS
[Abdominal Pain] : abdominal pain [Pelvic pain] : pelvic pain [Back Pain] : back pain [Negative] : Heme/Lymph

## 2021-12-28 NOTE — REASON FOR VISIT
[Home] : at home, [unfilled] , at the time of the visit. [Medical Office: ([facility])___] : at the medical office located in  [Verbal consent obtained from patient] : the patient, [unfilled] [Follow-Up] : a follow-up evaluation of

## 2022-01-02 ENCOUNTER — OUTPATIENT (OUTPATIENT)
Dept: OUTPATIENT SERVICES | Facility: HOSPITAL | Age: 47
LOS: 1 days | Discharge: HOME | End: 2022-01-02

## 2022-01-02 DIAGNOSIS — C50.919 MALIGNANT NEOPLASM OF UNSPECIFIED SITE OF UNSPECIFIED FEMALE BREAST: ICD-10-CM

## 2022-01-03 ENCOUNTER — NON-APPOINTMENT (OUTPATIENT)
Age: 47
End: 2022-01-03

## 2022-01-04 ENCOUNTER — APPOINTMENT (OUTPATIENT)
Dept: RHEUMATOLOGY | Facility: CLINIC | Age: 47
End: 2022-01-04
Payer: MEDICAID

## 2022-01-04 ENCOUNTER — APPOINTMENT (OUTPATIENT)
Dept: HEMATOLOGY ONCOLOGY | Facility: CLINIC | Age: 47
End: 2022-01-04
Payer: MEDICAID

## 2022-01-04 PROCEDURE — 99214 OFFICE O/P EST MOD 30 MIN: CPT | Mod: 95

## 2022-01-04 PROCEDURE — 99215 OFFICE O/P EST HI 40 MIN: CPT | Mod: 95

## 2022-01-04 NOTE — REVIEW OF SYSTEMS
[Negative] : Endocrine [Fever] : no fever [Chills] : no chills [Night Sweats] : no night sweats [Recent Change In Weight] : ~T no recent weight change [Abdominal Pain] : no abdominal pain [Vomiting] : no vomiting [Constipation] : no constipation [Diarrhea] : no diarrhea [FreeTextEntry2] : as above [FreeTextEntry7] : as above [FreeTextEntry9] : as above [de-identified] : as above

## 2022-01-04 NOTE — HISTORY OF PRESENT ILLNESS
[Home] : at home, [unfilled] , at the time of the visit. [Medical Office: ([facility])___] : at the medical office located in  [Verbal consent obtained from patient] : the patient, [unfilled] [de-identified] : Ms. Flowers is a J[place] female who on June 2, 2017 saw her gynecologist, Dr. Corrigan, as she had menses twice in the same month and subsequently felt tired. She was then referred for routine screening mammogram, which was performed on June 5, 2017 with the finding of a right breast mass with associated architectural distortion suspicious for malignancy with ultrasound-guided core biopsy recommended. The patient also consequently had a bilateral breast ultrasound on the same date with a finding of a hypoechoic mass with irregular margins and posterior shadowing at the 1 o'clock axis of the right breast corresponding to the findings on the mammogram with no further suspicious findings; ultrasound-guided core biopsy was recommended. She ultimately had an ultrasound-guided core biopsy performed on June 12, 2017 with a finding of invasive moderately differentiated duct carcinoma, with the largest focus of invasive carcinoma measuring up to 5 mm with evidence of DCIS, intermediate nuclear grade, solid and cribriform types, with no evidence of lymphovascular invasion, ER positive (77%), KS positive (83%), and HER-2/carmen (1+) and negative. The patient then went on to have a bilateral breast MRI with the right breast showing, within the upper inner quadrant, a spiculated enhancing mass corresponding to the biopsy-proven cancer measuring up to 2 cm in the mediolateral direction, and 1.7 cm in the anterior-posterior direction, and 1.5 cm in the craniocaudal direction; left breast showed, within the upper outer quadrant, a somewhat serpiginous area of enhancement with the more anterior aspect becoming more nodular and measuring up to 1.1 cm in size, with additional scattered nonspecific enhancing foci; the axillae were unremarkable. 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 The patient subsequently was seen with complaint of pain and burning sensation and a new "lump" at the left breast biopsy site with a comment from the radiologist of a small hematoma with surrounding ecchymosis present on July 22, 2017, with instructions for warm compresses and pressure. The patient ultimately saw Dr. Caroline Benítez and underwent a bilateral lumpectomy and reduction mammoplasty, which was performed on August 11, 2017, at [facility] with a finding in the right breast of an invasive ductal carcinoma, moderately differentiated, measuring 2 cm in greatest diameter, nuclear, Keymar score 7/9, with evidence of DCIS and LCIS present in addition to fibrocystic changes with margins negative for carcinoma, with 0/3 sentinel lymph nodes involved with carcinoma; the left breast excisional biopsy showed evidence of ductal carcinoma in situ with microinvasion, with DCIS being of high nuclear grade, solid and comedo type with central necrosis, evidence of lobular carcinoma in situ, with margins of resection negative for DCIS and microinvasion. The patient returned to the operating room on October 13, 2017 for a left sentinel lymph node biopsy with a finding of 0/3 left sentinel lymph nodes involved with carcinoma. She subsequently had Oncotype DX testing sent off on her right breast carcinoma with a finding of a recurrence score of 7, correlating to an 8% risk of distant recurrence within 10 years should she take tamoxifen alone. The patient saw a medical oncologist, Dr. Maldonado, at [facility] who recommended adjuvant chemotherapy with CMF. She also had BRCA testing sent off, specifically a BRCA 1/2 Ashkenazi [employer] 3-site mutation panel, which returned negative of note.\par \par She saw me in initial consultation in 11/17 and I recommended that she proceed with further genetic predisposition testing which returned negative for other known genetic predisposition mutations. Started Tamoxifen on October 24th 2017. She completed RT at [facility] () on 2/13/18. \par \par Disease: breast cancer  \par Pathology: right breast IDC; left breast DCIS \par TNM stage: T1, N0, M0 \par AJCC Stage: 1 \par \par In the past she had c/o increasing arthralgias and joint swelling. She followed up with her PCP and was found to have Lyme IgG Ab P23 s/p doxycycline 100mg BID x 21 days in the beginning of June 2019.  She was prescribed another 21 days but refused to take it due to side effects.  Since then, she reported improvement in her forgetfulness, lightheadedness, fatigue, hair thinning.  Fevers and night sweats resolved. Thyroid workup neg, mildly elevated TSH.  \par \par She was last seen in 7/19. In the interim she c/o gradually worsening arthralgias and body aches especially since 11/19. She saw a new PCP - Dr Grimm who then referred her for an ID consult, Dr Kaba, and had no serologic evidence of Lyme Dz, or other active infections. She then had a rheum consult with Dr. Charles, who assessed:\par \par "44 year old female presents with polyarticular joint pains and myalgias of unclear etiology. Some of her symptoms, including the distribution of joints (MCP's and PIP's), joint swelling, elevated CRP, and family history, are suggestive of rheumatoid arthritis, though other symptoms, including pain outside of the joints and lack of prolonged AM stiffness, are atypical. I have therefore ordered some more bloodwork and x-rays as further workup. She will follow up with me again in 2 weeks to review her results. In the meantime, I have also started her on a trial of low-dose prednisone."\par \par Pt reports prednisone did not lead to any improvement so she d/c'd it.  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 DDx also includes AE's from tamoxifen, though less likely.\par  - Rx naproxen 500mg BID.\par  - x-rays of L-spine, S-I joints.\par \par 44 year old female with polyarticular joint pains and low back pain and found to have (+)HLA-B27. 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 DDx also includes AE's from tamoxifen, though less likely.\par  - Rx naproxen 500mg BID.\par  - x-rays of L-spine, S-I joints.\par \par She ultimately was seen by Dr Charles (rheum). Found  CRP elevated, diffuse pain, undifferentiated spondyloarthropathy, inflammatory arthritis, muscle cramps, polyarticular joint pains and low back pain and found to have (+)HLA-B27. Presentation suggestive of undifferentiated spondyloarthropathy vs seronegative RA. MRI L-spine revealed degenerative changes / no evidence of spondylitis. DDx also included AE's from tamoxifen, though less likely. Symptoms had been improving on sulfasalazine, but then lost effect. Pt was recommended to start methotrexate. \par \par  \par  [de-identified] : Seen today for a f/u visit.. \par \par She c/o persistent intermittent, arthralgias, low back pain, muscles aches and generalized fatigue although there has been some interval improvement on Rosovo. \par \par She has chronic HAs that increased somewhat with the start of Rosovo but then when the dose was decreased these went back toward baseline again.  \par \par In the interim she has c/o LLQ pain, worse with meals. Seen and evaluated by Dr Miller (revd her note in [facility]) and her PCP - scheduled to see a GI MD tomorrow - last colonoscopy was 2018 per pt. \par \par She c/o 2 hours of L nipple pain recently which then resolved spont and now has int "itchiness"  - she has an appt scheduled for a screening MRI breast soon. She reports no change in appearance of her L nipple / areola and no palp masses. \par   \par \par Pt c/o increased weight over time. \par \par She denies any tamoxifen related side effects. \par \par 6/21 mammo/sono neg\par 1/21 MRI neg / benign findings.

## 2022-01-04 NOTE — PHYSICAL EXAM
[General Appearance - Alert] : alert [General Appearance - In No Acute Distress] : in no acute distress [Sclera] : the sclera and conjunctiva were normal [Outer Ear] : the ears and nose were normal in appearance [Neck Appearance] : the appearance of the neck was normal [Skin Color & Pigmentation] : normal skin color and pigmentation [Skin Turgor] : normal skin turgor [] : no rash [Oriented To Time, Place, And Person] : oriented to person, place, and time [Impaired Insight] : insight and judgment were intact [Affect] : the affect was normal [FreeTextEntry1] : (+)mild swelling in her B/L PIP's; right shoulder w/ pain upon abduction and internal rotation

## 2022-01-04 NOTE — ASSESSMENT
[FreeTextEntry1] : 46 year old female with polyarticular joint pains and low back pain and found to have (+)HLA-B27 as well as diffuse enthesopathy - suggestive of undifferentiated spondyloarthropathy.  MRI L-spine reveals degenerative changes / no evidence of spondylitis.  MRI pelvis w/ no evidence of sacroiliitis.  Symptoms had been improving on sulfasalazine, but it then lost effect.  Had mild improvement on MTX, but did not tolerate it (abd pain/diarrhea). Currently improved overall on rituximab + MTX SC (Rasuvo) + SSZ, though still w/ LBP, wesly when lyiong in bed at nights.  Pt also c/o frequent leg cramps.  Unable to use biologics given recent hx of breast CA.\par   - Cont Rasuvo 20mg SC weekly, folic acid 1mg daily, SSZ 1000mg BID.\par   - Hepatitis panel negative 6/20.  Quantiferon negative 4/2021.\par   - Flu vaccine (10/21), Pneumovax (10/21), Prevnar (8/20) UTD.  Pt has received the COVID19 vaccine + booster.\par   - Cont naproxen 500mg BID prn for now.\par   - Reiterated importance of exercise.\par   - warm compresses\par   - OTC topical analgesics.\par   - Check labs.\par

## 2022-01-04 NOTE — PHYSICAL EXAM
[Fully active, able to carry on all pre-disease performance without restriction] : Status 0 - Fully active, able to carry on all pre-disease performance without restriction [Normal] : affect appropriate [de-identified] : sclerae anicteric [de-identified] : no labored breathing

## 2022-01-04 NOTE — HISTORY OF PRESENT ILLNESS
[Home] : at home, [unfilled] , at the time of the visit. [Medical Office: ([facility])___] : at the medical office located in  [Verbal consent obtained from patient] : the patient, [unfilled] [___ Month(s) Ago] : [unfilled] month(s) ago [Fatigue] : fatigue [Arthralgias] : arthralgias [Myalgias] : myalgias [FreeTextEntry1] : Pt feeling better overall since starting SSZ - joints have improved, wesly her shoulders, though she c/o LBP, worst lying in bed at nights - often wakes her up.  No new complaints. [Anorexia] : no anorexia [Weight Loss] : no weight loss [Malaise] : no malaise [Fever] : no fever [Chills] : no chills [Malar Facial Rash] : no malar facial rash [Skin Lesions] : no lesions [Skin Nodules] : no skin nodules [Oral Ulcers] : no oral ulcers [Cough] : no cough [Dry Mouth] : no dry mouth [Dysphonia] : no dysphonia [Dysphagia] : no dysphagia [Shortness of Breath] : no shortness of breath [Chest Pain] : no chest pain [Joint Swelling] : no joint swelling [Joint Warmth] : no joint warmth [Joint Deformity] : no joint deformity [Decreased ROM] : no decreased range of motion [Morning Stiffness] : no morning stiffness [Falls] : no falls [Difficulty Standing] : no difficulty standing [Difficulty Walking] : no difficulty walking [Dyspnea] : no dyspnea [Muscle Weakness] : no muscle weakness [Muscle Spasms] : no muscle spasms [Muscle Cramping] : no muscle cramping [Visual Changes] : no visual changes [Eye Pain] : no eye pain [Eye Redness] : no eye redness [Dry Eyes] : no dry eyes

## 2022-01-05 ENCOUNTER — APPOINTMENT (OUTPATIENT)
Dept: GASTROENTEROLOGY | Facility: CLINIC | Age: 47
End: 2022-01-05
Payer: MEDICAID

## 2022-01-05 VITALS
HEART RATE: 96 BPM | DIASTOLIC BLOOD PRESSURE: 88 MMHG | BODY MASS INDEX: 31.1 KG/M2 | WEIGHT: 169 LBS | HEIGHT: 62 IN | SYSTOLIC BLOOD PRESSURE: 138 MMHG | OXYGEN SATURATION: 99 %

## 2022-01-05 DIAGNOSIS — Z98.890 OTHER SPECIFIED POSTPROCEDURAL STATES: ICD-10-CM

## 2022-01-05 DIAGNOSIS — Z86.010 OTHER SPECIFIED POSTPROCEDURAL STATES: ICD-10-CM

## 2022-01-05 PROCEDURE — 99204 OFFICE O/P NEW MOD 45 MIN: CPT

## 2022-01-07 ENCOUNTER — RESULT REVIEW (OUTPATIENT)
Age: 47
End: 2022-01-07

## 2022-01-07 ENCOUNTER — OUTPATIENT (OUTPATIENT)
Dept: OUTPATIENT SERVICES | Facility: HOSPITAL | Age: 47
LOS: 1 days | End: 2022-01-07
Payer: MEDICAID

## 2022-01-07 ENCOUNTER — APPOINTMENT (OUTPATIENT)
Dept: MRI IMAGING | Facility: CLINIC | Age: 47
End: 2022-01-07
Payer: MEDICAID

## 2022-01-07 DIAGNOSIS — Q43.3 CONGENITAL MALFORMATIONS OF INTESTINAL FIXATION: ICD-10-CM

## 2022-01-07 PROCEDURE — 72197 MRI PELVIS W/O & W/DYE: CPT | Mod: 26

## 2022-01-07 PROCEDURE — 74183 MRI ABD W/O CNTR FLWD CNTR: CPT

## 2022-01-07 PROCEDURE — 74183 MRI ABD W/O CNTR FLWD CNTR: CPT | Mod: 26

## 2022-01-07 PROCEDURE — 72197 MRI PELVIS W/O & W/DYE: CPT

## 2022-01-07 NOTE — HISTORY OF PRESENT ILLNESS
[de-identified] : Dr. Grimm takes care of this very pleasant 46-year-old female\par \par Chronic left lower quadrant discomfort\par \par Its present 24/7\par \par Dull achy\par \par No change with eating, bowel movements or activity\par \par CAT scan of abdomen pelvis with IV and oral contrast suggest malrotation of the small bowel, IUD in place, questionable movement of it\par \par Seen by GYN who feels the IUD is in the appropriate position\par \par There is no bowel obstruction\par \par She had colonoscopy 4 years ago at [facility] and had polyps removed\par \par No family history of any intestinal elements\par \par No nausea, vomiting, fever or chills or weight loss

## 2022-01-07 NOTE — REASON FOR VISIT
[Initial Evaluation] : an initial evaluation [FreeTextEntry1] : Chronic left lower quadrant discomfort CAT scan suggesting malrotation of the small intestine and possible misplacement or change in placement of intrauterine device, though the patient tells me GYN feels it is in the appropriate place, history of colon polyps

## 2022-01-07 NOTE — ASSESSMENT
[FreeTextEntry1] : Impression\par \par Chronic left lower quadrant discomfort\par \par CAT scan suggesting malrotation of the small bowel without obstruction\par \par History of colon polyps\par \par Suggest\par \par MR enterography\par \par Colonoscopy\par \par Nulytely\par \par Risks/benefits:\par The procedure, the risks and benefits and alternatives have been reviewed in great detail with the patient.  Risks including, but not limited to sedation such as cardiac and pulmonary compromise, the procedure itself such as bleeding requiring hospitalization, transfusion, surgery, temporary or permanent colostomy.  Perforation or puncture of the requiring hospitalization, surgery, temporary colostomy.\par It has been explained to the patient that though colonoscopy is thought to be the best screening exam for colon cancer and polyps, no screening exam can find all colon polyps or cancers.  \par The patient expresses understanding of the procedure and consents to undergoing the procedure.\par \par Call me anytime\par \par Go to emergency room if needed

## 2022-01-09 ENCOUNTER — APPOINTMENT (OUTPATIENT)
Dept: AFTER HOURS CARE | Facility: EMERGENCY ROOM | Age: 47
End: 2022-01-09
Payer: MEDICAID

## 2022-01-09 PROCEDURE — 99214 OFFICE O/P EST MOD 30 MIN: CPT | Mod: 95

## 2022-01-09 NOTE — HISTORY OF PRESENT ILLNESS
[Home] : at home, [unfilled] , at the time of the visit. [Other Location: e.g. Home (Enter Location, City,State)___] : at [unfilled] [Verbal consent obtained from patient] : the patient, [unfilled] [FreeTextEntry8] : 46F hx L breast ca s/p chemo on tamoxifen, RA on rasuvo and naproxen, chronic LLQ pain recently found to be malrotation - workup ongoing w/colonoscopy scheduled, last seen by GI 4d ago now p/w intermittent LLQ pain and nausea today. Pt st she has little appetite but has not vomited. +flatus and stooling, no distention. No fever, back pain, urinary sx, CP, SOB, HA, visual change, neck pain/stiffness. Pt st she doesn’t want to go to the ER because of her immunosuppression, but doesn’t know what to do for her GI sx.

## 2022-01-09 NOTE — PLAN
[With new medications prescribed] : Treat in place: with new medications prescribed [FreeTextEntry1] : rx zofran\par po hydration\par add tylenol to naproxen\par strict precautions and guidance\par pmd vs specialist follow up

## 2022-01-09 NOTE — ASSESSMENT
[FreeTextEntry1] : ongoing discomfort, likely from malro - will add tylenol to naproxen, will add zofran to encourage PO hydration. Pt will\par need to f/u GI ASAP. Pt counseled at length over need for ED if escalating pain or inability to tolerate PO/dehydration.

## 2022-01-10 ENCOUNTER — APPOINTMENT (OUTPATIENT)
Dept: OBGYN | Facility: CLINIC | Age: 47
End: 2022-01-10
Payer: MEDICAID

## 2022-01-10 ENCOUNTER — TRANSCRIPTION ENCOUNTER (OUTPATIENT)
Age: 47
End: 2022-01-10

## 2022-01-10 DIAGNOSIS — N83.209 UNSPECIFIED OVARIAN CYST, UNSPECIFIED SIDE: ICD-10-CM

## 2022-01-10 PROCEDURE — 99213 OFFICE O/P EST LOW 20 MIN: CPT | Mod: 95

## 2022-01-13 ENCOUNTER — EMERGENCY (EMERGENCY)
Facility: HOSPITAL | Age: 47
LOS: 1 days | Discharge: HOME | End: 2022-01-13
Attending: EMERGENCY MEDICINE
Payer: MEDICAID

## 2022-01-13 VITALS
HEIGHT: 62 IN | SYSTOLIC BLOOD PRESSURE: 156 MMHG | RESPIRATION RATE: 16 BRPM | TEMPERATURE: 98 F | DIASTOLIC BLOOD PRESSURE: 89 MMHG | OXYGEN SATURATION: 100 % | WEIGHT: 173.06 LBS | HEART RATE: 91 BPM

## 2022-01-13 VITALS
OXYGEN SATURATION: 100 % | RESPIRATION RATE: 19 BRPM | DIASTOLIC BLOOD PRESSURE: 91 MMHG | SYSTOLIC BLOOD PRESSURE: 148 MMHG | TEMPERATURE: 98 F | HEART RATE: 88 BPM

## 2022-01-13 LAB
ALBUMIN SERPL ELPH-MCNC: 4.4 G/DL — SIGNIFICANT CHANGE UP (ref 3.3–5)
ALP SERPL-CCNC: 65 U/L — SIGNIFICANT CHANGE UP (ref 40–120)
ALT FLD-CCNC: 10 U/L — SIGNIFICANT CHANGE UP (ref 10–45)
ANION GAP SERPL CALC-SCNC: 12 MMOL/L — SIGNIFICANT CHANGE UP (ref 5–17)
APPEARANCE UR: ABNORMAL
AST SERPL-CCNC: 12 U/L — SIGNIFICANT CHANGE UP (ref 10–40)
BACTERIA # UR AUTO: ABNORMAL
BASOPHILS # BLD AUTO: 0.04 K/UL — SIGNIFICANT CHANGE UP (ref 0–0.2)
BASOPHILS NFR BLD AUTO: 0.5 % — SIGNIFICANT CHANGE UP (ref 0–2)
BILIRUB SERPL-MCNC: 0.3 MG/DL — SIGNIFICANT CHANGE UP (ref 0.2–1.2)
BILIRUB UR-MCNC: NEGATIVE — SIGNIFICANT CHANGE UP
BUN SERPL-MCNC: 15 MG/DL — SIGNIFICANT CHANGE UP (ref 7–23)
CALCIUM SERPL-MCNC: 9.3 MG/DL — SIGNIFICANT CHANGE UP (ref 8.4–10.5)
CHLORIDE SERPL-SCNC: 102 MMOL/L — SIGNIFICANT CHANGE UP (ref 96–108)
CO2 SERPL-SCNC: 24 MMOL/L — SIGNIFICANT CHANGE UP (ref 22–31)
COLOR SPEC: YELLOW — SIGNIFICANT CHANGE UP
CREAT SERPL-MCNC: 0.63 MG/DL — SIGNIFICANT CHANGE UP (ref 0.5–1.3)
DIFF PNL FLD: ABNORMAL
EOSINOPHIL # BLD AUTO: 0.04 K/UL — SIGNIFICANT CHANGE UP (ref 0–0.5)
EOSINOPHIL NFR BLD AUTO: 0.5 % — SIGNIFICANT CHANGE UP (ref 0–6)
EPI CELLS # UR: 8 /HPF — HIGH
GLUCOSE SERPL-MCNC: 100 MG/DL — HIGH (ref 70–99)
GLUCOSE UR QL: NEGATIVE — SIGNIFICANT CHANGE UP
HCT VFR BLD CALC: 34.3 % — LOW (ref 34.5–45)
HGB BLD-MCNC: 11.3 G/DL — LOW (ref 11.5–15.5)
HYALINE CASTS # UR AUTO: 5 /LPF — HIGH (ref 0–2)
IMM GRANULOCYTES NFR BLD AUTO: 0.3 % — SIGNIFICANT CHANGE UP (ref 0–1.5)
KETONES UR-MCNC: SIGNIFICANT CHANGE UP
LEUKOCYTE ESTERASE UR-ACNC: ABNORMAL
LYMPHOCYTES # BLD AUTO: 1.34 K/UL — SIGNIFICANT CHANGE UP (ref 1–3.3)
LYMPHOCYTES # BLD AUTO: 18.3 % — SIGNIFICANT CHANGE UP (ref 13–44)
MCHC RBC-ENTMCNC: 32.9 GM/DL — SIGNIFICANT CHANGE UP (ref 32–36)
MCHC RBC-ENTMCNC: 33.5 PG — SIGNIFICANT CHANGE UP (ref 27–34)
MCV RBC AUTO: 101.8 FL — HIGH (ref 80–100)
MONOCYTES # BLD AUTO: 0.73 K/UL — SIGNIFICANT CHANGE UP (ref 0–0.9)
MONOCYTES NFR BLD AUTO: 10 % — SIGNIFICANT CHANGE UP (ref 2–14)
NEUTROPHILS # BLD AUTO: 5.14 K/UL — SIGNIFICANT CHANGE UP (ref 1.8–7.4)
NEUTROPHILS NFR BLD AUTO: 70.4 % — SIGNIFICANT CHANGE UP (ref 43–77)
NITRITE UR-MCNC: NEGATIVE — SIGNIFICANT CHANGE UP
NRBC # BLD: 0 /100 WBCS — SIGNIFICANT CHANGE UP (ref 0–0)
PH UR: 5.5 — SIGNIFICANT CHANGE UP (ref 5–8)
PLATELET # BLD AUTO: 262 K/UL — SIGNIFICANT CHANGE UP (ref 150–400)
POTASSIUM SERPL-MCNC: 3.5 MMOL/L — SIGNIFICANT CHANGE UP (ref 3.5–5.3)
POTASSIUM SERPL-SCNC: 3.5 MMOL/L — SIGNIFICANT CHANGE UP (ref 3.5–5.3)
PROT SERPL-MCNC: 7.9 G/DL — SIGNIFICANT CHANGE UP (ref 6–8.3)
PROT UR-MCNC: ABNORMAL
RBC # BLD: 3.37 M/UL — LOW (ref 3.8–5.2)
RBC # FLD: 12.9 % — SIGNIFICANT CHANGE UP (ref 10.3–14.5)
RBC CASTS # UR COMP ASSIST: 1 /HPF — SIGNIFICANT CHANGE UP (ref 0–4)
SODIUM SERPL-SCNC: 138 MMOL/L — SIGNIFICANT CHANGE UP (ref 135–145)
SP GR SPEC: 1.02 — SIGNIFICANT CHANGE UP (ref 1.01–1.02)
UROBILINOGEN FLD QL: NEGATIVE — SIGNIFICANT CHANGE UP
WBC # BLD: 7.31 K/UL — SIGNIFICANT CHANGE UP (ref 3.8–10.5)
WBC # FLD AUTO: 7.31 K/UL — SIGNIFICANT CHANGE UP (ref 3.8–10.5)
WBC UR QL: 32 /HPF — HIGH (ref 0–5)

## 2022-01-13 PROCEDURE — 96374 THER/PROPH/DIAG INJ IV PUSH: CPT

## 2022-01-13 PROCEDURE — 85025 COMPLETE CBC W/AUTO DIFF WBC: CPT

## 2022-01-13 PROCEDURE — 99284 EMERGENCY DEPT VISIT MOD MDM: CPT

## 2022-01-13 PROCEDURE — 81001 URINALYSIS AUTO W/SCOPE: CPT

## 2022-01-13 PROCEDURE — 87086 URINE CULTURE/COLONY COUNT: CPT

## 2022-01-13 PROCEDURE — 96375 TX/PRO/DX INJ NEW DRUG ADDON: CPT

## 2022-01-13 PROCEDURE — 80053 COMPREHEN METABOLIC PANEL: CPT

## 2022-01-13 RX ORDER — CEFPODOXIME PROXETIL 100 MG
1 TABLET ORAL
Qty: 20 | Refills: 0
Start: 2022-01-13 | End: 2022-01-22

## 2022-01-13 RX ORDER — CEFTRIAXONE 500 MG/1
1000 INJECTION, POWDER, FOR SOLUTION INTRAMUSCULAR; INTRAVENOUS ONCE
Refills: 0 | Status: COMPLETED | OUTPATIENT
Start: 2022-01-13 | End: 2022-01-13

## 2022-01-13 RX ORDER — KETOROLAC TROMETHAMINE 30 MG/ML
15 SYRINGE (ML) INJECTION ONCE
Refills: 0 | Status: DISCONTINUED | OUTPATIENT
Start: 2022-01-13 | End: 2022-01-13

## 2022-01-13 RX ADMIN — Medication 15 MILLIGRAM(S): at 21:24

## 2022-01-13 RX ADMIN — CEFTRIAXONE 100 MILLIGRAM(S): 500 INJECTION, POWDER, FOR SOLUTION INTRAMUSCULAR; INTRAVENOUS at 22:15

## 2022-01-13 NOTE — ED PROVIDER NOTE - PATIENT PORTAL LINK FT
You can access the FollowMyHealth Patient Portal offered by [facility] by registering at the following website: [email or web address]. By joining [employer]’s FollowMyHealth portal, you will also be able to view your health information using other applications (apps) compatible with our system.

## 2022-01-13 NOTE — ED ADULT TRIAGE NOTE - CHIEF COMPLAINT QUOTE
abdominal pain since late november, had negative CT, negative US, abdominal MRI shows malrotation of small intestine last night had fever and chills.

## 2022-01-13 NOTE — ED PROVIDER NOTE - OBJECTIVE STATEMENT
46 year old female with a PMHx of breast cancer, spondyloarthritis presenting with abdominal pain. Pain has been present since November. Located in the LLQ. Associated diarrhea and constipation, nausea, intermittent fevers, dysuria. Denies CP, SOB, vomiting, blood in stool, hematuria. Patient has had an abdominal US, CT, and MRI. The MRI showed malrotation and 3.4 cm left ovarian cyst.

## 2022-01-13 NOTE — ED PROVIDER NOTE - PHYSICAL EXAMINATION
gen: well appearing  Mentation: AAO x 3  psych: mood appropriate  HEENT: airway patent, conjunctivae clear bilaterally  Cardio: RRR, no m/r/g  Resp: normal BS b/l  GI: LLQ TTP, soft/nondistended  : Left CVA tenderness  Skin: No evidence of rash  Lymph/Vasc: no LE edema

## 2022-01-13 NOTE — ED PROVIDER NOTE - ATTENDING CONTRIBUTION TO CARE
left lower quadrant  / L flank/back pain / UTI symptoms  minimally tender abdomen, llq w + L CVAT  probably pyelonephritis. labs . ua . re-assess.

## 2022-01-13 NOTE — ED ADULT NURSE NOTE - OBJECTIVE STATEMENT
46 year old female presents to ED via walk-in complaining of abdominal pain since November. Pt has had multiple scans that do not explain her pain. Upon assessment A&O x4, ambulatory and well appearing. Abdomen soft, non tender and non distended. Endorses chronic constipation. States pain in abdomen has been constant but in the past week worsening. Endorses fever at home, but afebrile rectally in emergency room. Has not taken tylenol or motrin since 7am. VS as documented. Bed locked and lowered. Comfort and safety measures maintained.

## 2022-01-13 NOTE — ED PROVIDER NOTE - CLINICAL SUMMARY MEDICAL DECISION MAKING FREE TEXT BOX
Patient has had recent abdominal imaging with no explanation of abdominal pain. Patient has had some dysuria. UTI vs Pyelo vs colitis. Labs, UA, urine culture, toradol. Patient needs colonoscopy and is in contact with GI.

## 2022-01-13 NOTE — ED PROVIDER NOTE - NSFOLLOWUPINSTRUCTIONS_ED_ALL_ED_FT
Please follow up with your primary care physician within the next 4-6 days.    Medications have been sent to your pharmacy. Please take them as directed.    Kidney Infection (pyelonephritis)    WHAT YOU NEED TO KNOW:    A kidney infection, or pyelonephritis, is a bacterial infection. The infection usually starts in your bladder or urethra and moves into your kidney. One or both kidneys may be infected.   Kidney, Ureters, Bladder  DISCHARGE INSTRUCTIONS:  Seek care immediately if:   •You have a fever and chills.   •You cannot stop vomiting.  •You have severe pain in your abdomen, lower back, or sides.  Contact your healthcare provider if:   •You continue to have a fever after you take antibiotics for 3 days.  •You have pain when you urinate, even after treatment.  •Your signs and symptoms return.  •You have questions or concerns about your condition or care.  Medicines: You may need any of the following:   •Antibiotics treat your bacterial infection.   •Acetaminophen decreases pain and fever. It is available without a doctor's order. Ask how much to take and how often to take it. Follow directions. Read the labels of all other medicines you are using to see if they also contain acetaminophen, or ask your doctor or pharmacist. Acetaminophen can cause liver damage if not taken correctly. Do not use more than 4 grams (4,000 milligrams) total of acetaminophen in one day.   •NSAIDs, such as ibuprofen, help decrease swelling, pain, and fever. This medicine is available with or without a doctor's order. NSAIDs can cause stomach bleeding or kidney problems in certain people. If you take blood thinner medicine, always ask if NSAIDs are safe for you. Always read the medicine label and follow directions. Do not give these medicines to children under 6 months of age without direction from your child's healthcare provider.  •Prescription pain medicine may be given. Ask how to take this medicine safely.  •Take your medicine as directed. Contact your healthcare provider if you think your medicine is not helping or if you have side effects. Tell him of her if you are allergic to any medicine. Keep a list of the medicines, vitamins, and herbs you take. Include the amounts, and when and why you take them. Bring the list or the pill bottles to follow-up visits. Carry your medicine list with you in case of an emergency.  Drink liquids as directed: You may need to drink extra liquids to help flush your kidneys and urinary system. Water is the best liquid to drink. Ask your healthcare provider how much liquid to drink each day and which liquids are best for you.  Urinate as soon as you feel the urge: This will help flush bacteria from your urinary system. Do not wait or hold your urine for too long.   Clean your genital area every day with soap and water: Wipe from front to back after you urinate or have a bowel movement. Wear cotton underwear. Fabrics such as nylon and polyester can stay damp. This can increase your risk for infection. Urinate within 15 minutes after you have sex.    Follow up with your healthcare provider as directed: Write down your questions so you remember to ask them during your visits.

## 2022-01-15 LAB
CULTURE RESULTS: SIGNIFICANT CHANGE UP
SPECIMEN SOURCE: SIGNIFICANT CHANGE UP

## 2022-01-16 ENCOUNTER — APPOINTMENT (OUTPATIENT)
Dept: FAMILY MEDICINE | Facility: CLINIC | Age: 47
End: 2022-01-16
Payer: MEDICAID

## 2022-01-16 VITALS
HEIGHT: 62 IN | HEART RATE: 93 BPM | DIASTOLIC BLOOD PRESSURE: 72 MMHG | OXYGEN SATURATION: 98 % | BODY MASS INDEX: 31.1 KG/M2 | SYSTOLIC BLOOD PRESSURE: 121 MMHG | WEIGHT: 169 LBS | TEMPERATURE: 97.6 F

## 2022-01-16 PROCEDURE — 99213 OFFICE O/P EST LOW 20 MIN: CPT | Mod: 25

## 2022-01-16 NOTE — ED POST DISCHARGE NOTE - OTHER COMMUNICATION
...(continued from above). Pt acknowledged understanding, all questions answered, appreciative of call. - Neri Roach PA-C.

## 2022-01-16 NOTE — ED POST DISCHARGE NOTE - DETAILS
1/16/22: Spoke w/ pt, informed her of result as above. Advised her could possibly be yeast infection though sxs not classic of that. Advised her to compelte abx course for presumed pyelo and f/u with PMD. Pt saw PMD today, was advised to do the same and to f/u for a repeat cx upon completion of abx which pt plans on doing. feel this plan is reasonably given extremely low colony count of yeast cells and pt immunocompetent. Pt to f/u with PMD again later this week once abx completed for re-eval...

## 2022-01-21 PROBLEM — C50.919 MALIGNANT NEOPLASM OF UNSPECIFIED SITE OF UNSPECIFIED FEMALE BREAST: Chronic | Status: ACTIVE | Noted: 2022-01-13

## 2022-01-21 PROBLEM — Z87.39 PERSONAL HISTORY OF OTHER DISEASES OF THE MUSCULOSKELETAL SYSTEM AND CONNECTIVE TISSUE: Chronic | Status: ACTIVE | Noted: 2022-01-13

## 2022-01-24 ENCOUNTER — APPOINTMENT (OUTPATIENT)
Dept: GASTROENTEROLOGY | Facility: AMBULATORY MEDICAL SERVICES | Age: 47
End: 2022-01-24

## 2022-01-26 ENCOUNTER — APPOINTMENT (OUTPATIENT)
Dept: BREAST CENTER | Facility: CLINIC | Age: 47
End: 2022-01-26
Payer: MEDICAID

## 2022-01-26 LAB
APPEARANCE: CLEAR
BACTERIA: NEGATIVE
BILIRUBIN URINE: NEGATIVE
BLOOD URINE: NEGATIVE
COLOR: NORMAL
GLUCOSE QUALITATIVE U: NEGATIVE
HYALINE CASTS: 4 /LPF
KETONES URINE: NEGATIVE
LEUKOCYTE ESTERASE URINE: NEGATIVE
MICROSCOPIC-UA: NORMAL
NITRITE URINE: NEGATIVE
PH URINE: 6
PROTEIN URINE: NORMAL
RED BLOOD CELLS URINE: 1 /HPF
SPECIFIC GRAVITY URINE: 1.02
SQUAMOUS EPITHELIAL CELLS: 2 /HPF
UROBILINOGEN URINE: NORMAL
WHITE BLOOD CELLS URINE: 1 /HPF

## 2022-01-28 LAB — BACTERIA UR CULT: NORMAL

## 2022-01-31 ENCOUNTER — APPOINTMENT (OUTPATIENT)
Dept: BREAST CENTER | Facility: CLINIC | Age: 47
End: 2022-01-31
Payer: MEDICAID

## 2022-01-31 VITALS
DIASTOLIC BLOOD PRESSURE: 94 MMHG | HEIGHT: 62 IN | WEIGHT: 169 LBS | BODY MASS INDEX: 31.1 KG/M2 | TEMPERATURE: 97.2 F | SYSTOLIC BLOOD PRESSURE: 168 MMHG | HEART RATE: 96 BPM

## 2022-01-31 PROCEDURE — 99213 OFFICE O/P EST LOW 20 MIN: CPT

## 2022-01-31 PROCEDURE — 93702 BIS XTRACELL FLUID ANALYSIS: CPT

## 2022-01-31 NOTE — PAST MEDICAL HISTORY
[Perimenopausal] : The patient is perimenopausal [Menarche Age ____] : age at menarche was [unfilled] [Definite ___ (Date)] : the last menstrual period was [unfilled] [Total Preg ___] : G[unfilled] [Living ___] : Living: [unfilled] [Age At Live Birth ___] : Age at live birth: [unfilled] [FreeTextEntry6] : none [FreeTextEntry7] : 6 mos [FreeTextEntry8] : 6 mos

## 2022-01-31 NOTE — PHYSICAL EXAM
[Normocephalic] : normocephalic [Atraumatic] : atraumatic [Supple] : supple [No Supraclavicular Adenopathy] : no supraclavicular adenopathy [No Thyromegaly] : no thyromegaly [Examined in the supine and seated position] : examined in the supine and seated position [No dominant masses] : no dominant masses in right breast  [No dominant masses] : no dominant masses left breast [No Nipple Retraction] : no left nipple retraction [No Nipple Discharge] : no left nipple discharge [No Axillary Lymphadenopathy] : no left axillary lymphadenopathy [No Edema] : no edema [No Swelling] : no swelling [No Rashes] : no rashes [No Ulceration] : no ulceration [de-identified] : Bilat reduction scars. Excellent cosmesis. Bilat lumpy bumpy breasts. No adenopathy. CORIE [de-identified] : Limited ROM in Bilat shoulders - PT referral given, ~75%

## 2022-01-31 NOTE — PROCEDURE
[FreeTextEntry1] : ANDER measurement [FreeTextEntry2] : Lymphedema analysis [FreeTextEntry3] : The patient had a 6 month f/u SOZO measurement which I reviewed today. The score is within normal limits, see flowsheet. Bioimpedance spectroscopy helps identify the onset of lymphedema in an arm or leg before patients experience noticeable swelling. Research has shown that the early detection of lymphedema using L-Dex combined with treatment can reduce progression to chronic lymphedema by 95% in breast cancer patients. Whenever possible, patients are tested for baseline L-Dex score before cancer treatment begins and then are reassessed during regular follow-up visits using the SOZO device. Otherwise, this can be started postoperatively and continued during regular follow-up visits. If the patient’s L-Dex score increases above normal levels, that is a sign that lymphedema is developing and a referral is made to physical therapy for further evaluation and early compression treatment. Lymphedema assessment with the SOZO L-Dex score is recommended to be done every 3 months for the first 3 years and then every 6 months for years 4 and 5 followed by annually afterwards.\par

## 2022-01-31 NOTE — HISTORY OF PRESENT ILLNESS
[FreeTextEntry1] : 45 y/o BRCA neg female here for f.u for CBE and review recent MRI, hx of bilat BCT, pt is 4.5 years out. Pt denies any breast lesions, discharge or masses.\par 8/11/17 S/p bilat BCS with R SLN, bilat breast reduction, oncoplastic surgery and biozorb insertion on for RIGHT UIQ [id number] Infiltrating ductal Cancer, margins 1.2 cm, Gr 2. LN 1/4, ER/AK positive and [id number] neg. Oncotype was 7. LEFT was pT1mic, N0, Margins neg at 5 mm. ER+/AK neg and Her2 neg. s/p L SLNBx due to microinvasion on final path 10/13/17. \par \par Reporting bilateral tenderness/pain to axilla and R upper inner breasts, mostly when it rains. She feels L>R nipple pain at times, and recently had some swelling to her L axilla prior to LMP which has now dissipated. \par Is currently followed by Rheumatology () for arthralgia/ ? Lymes Disease. Occasional swelling to hands. \par \par Sees medical oncologist: Wade Alejo, Started tamoxifen 10/24/17. Still cycling monthly. Last seen via TEB 1/4/22\par  Has new GYN, Dr. Yumi Miller - due for f/u 2/7/22\par Finished EBRT 2/18/18 Dr. Nogueira rad onc. Discharged plastic surgery Dr. Gatica.\par \par 1/8/21, ZP, MRI: bilat scattered nonspecific enhancing foci, post surg changes, no susp findings, no significant adenopathy, continued bilat MRI f/u rec in 1 year, BR1\par 6/30/21, ZP, Bilat sMMG: het dense, no sup findings, biozorb in places and surgical clips in axilla, rec U/S for dense breasts and mmg 1 year, BR2\par 6/30/21, ZP, Bilat U/S: R post surg changes 1:00 w/o assoc mass, no susp findings, L post sutg changes several nodes ranging up to 1.2cm stable, subcentimeter cyst present RA region, no susp findings, no adenopathy bilat, BR2\par 1/21/22, ZP, MRI: Bilat scattered enhancing nonspecific foci, no susp findings, post surg changes noted, no sig lymphadenopathy, no MRI evidence of malignancy, BR2\par

## 2022-01-31 NOTE — ASSESSMENT
[FreeTextEntry1] : 47 y/o BRCA neg female here for f.u for CBE and review recent MRI, hx of bilat BCT, pt is 4.5 years out. Pt denies any breast lesions, discharge or masses.\par 8/11/17 S/p bilat BCS with R SLN, bilat breast reduction, oncoplastic surgery and biozorb insertion on for RIGHT UIQ [id number] Infiltrating ductal Cancer, margins 1.2 cm, Gr 2. LN 1/4, ER/UT positive and [id number] neg. Oncotype was 7. LEFT was pT1mic, N0, Margins neg at 5 mm. ER+/UT neg and Her2 neg. s/p L SLNBx due to microinvasion on final path 10/13/17. \par \par Reporting bilateral tenderness/pain to axilla and R upper inner breasts, mostly when it rains. She feels L>R nipple pain at times, and recently had some swelling to her L axilla prior to LMP which has now dissipated. \par Is currently followed by Rheumatology () for arthralgia/ ? Lymes Disease. Occasional swelling to hands. \par \par Sees medical oncologist: Wade Alejo, Started tamoxifen 10/24/17. Still cycling monthly. Last seen via TEB 1/4/22\par  Has new GYN, Dr. Yumi Miller - due for f/u 2/7/22\par Finished EBRT 2/18/18 Dr. Nogueira rad onc. Discharged plastic surgery Dr. Gatica.\par \par 1/8/21, ZP, MRI: bilat scattered nonspecific enhancing foci, post surg changes, no susp findings, no significant adenopathy, continued bilat MRI f/u rec in 1 year, BR1\par 6/30/21, ZP, Bilat sMMG: het dense, no sup findings, biozorb in places and surgical clips in axilla, rec U/S for dense breasts and mmg 1 year, BR2\par 6/30/21, ZP, Bilat U/S: R post surg changes 1:00 w/o assoc mass, no susp findings, L post sutg changes several nodes ranging up to 1.2cm stable, subcentimeter cyst present RA region, no susp findings, no adenopathy bilat, BR2\par 1/21/22, ZP, MRI: Bilat scattered enhancing nonspecific foci, no susp findings, post surg changes noted, no sig lymphadenopathy, no MRI evidence of malignancy, BR2\par \par CBE: Bilat reduction scars. Excellent cosmesis. Bilat lumpy bumpy breasts. No adenopathy. CORIE\par ROM limited at shoulders. No lymphedema. \par Reviewed recent MRI, no suspicious findings. Discussed stress reducing activities such as gardening, meditation, aromatherapy. Also discussed tylenol/motrin PRN. SOZO done today. Pt also has RA, referral for PT given to pt to make appt. Also PMNR for Dr. Dugan contact given - pt will make an appt.

## 2022-02-02 DIAGNOSIS — Z12.9 ENCOUNTER FOR SCREENING FOR MALIGNANT NEOPLASM, SITE UNSPECIFIED: ICD-10-CM

## 2022-02-02 LAB — SARS-COV-2 N GENE NPH QL NAA+PROBE: NOT DETECTED

## 2022-02-03 ENCOUNTER — APPOINTMENT (OUTPATIENT)
Dept: PHYSICAL MEDICINE AND REHAB | Facility: CLINIC | Age: 47
End: 2022-02-03
Payer: MEDICAID

## 2022-02-03 ENCOUNTER — TRANSCRIPTION ENCOUNTER (OUTPATIENT)
Age: 47
End: 2022-02-03

## 2022-02-03 VITALS
WEIGHT: 169 LBS | HEIGHT: 62 IN | TEMPERATURE: 97.1 F | BODY MASS INDEX: 31.1 KG/M2 | HEART RATE: 90 BPM | SYSTOLIC BLOOD PRESSURE: 145 MMHG | DIASTOLIC BLOOD PRESSURE: 82 MMHG

## 2022-02-03 PROCEDURE — 99204 OFFICE O/P NEW MOD 45 MIN: CPT

## 2022-02-03 NOTE — PHYSICAL EXAM
[FreeTextEntry1] : Gen: Patient is A&O x 3, NAD\par HEENT: EOMI, hearing grossly normal\par Resp: regular, non - labored\par CV: pulses regular\par Skin: no rashes, erythema\par Lymph: no clubbing, cyanosis, edema, or palpable lymphadenopathy\par Inspection: no instability or misalignment\par ROM: full throughout\par Palpation: TTP bilateral pectoral muscles \par Sensation: intact to light touch\par Reflexes: 1+ and symmetric throughout\par Strength: 5/5 throughout\par Special tests: -Hawkin's sign bilateral \par Gait: normal, non-antalgic\par \par

## 2022-02-03 NOTE — HISTORY OF PRESENT ILLNESS
[FreeTextEntry1] : Ms. Flowers is a 46 year old female with history of bilateral breast cancer.  8/11/17 S/p bilat BCS with R SLN, bilat breast reduction, oncoplastic surgery and biozorb insertion on for RIGHT UIQ [id number] Infiltrating ductal Cancer, margins 1.2 cm, Gr 2. LN 1/4, ER/UT positive and Dkt6dfe neg.  S/p L SLNBx due to microinvasion on final path 10/13/17.  Started tamoxifen 10/24/17.  Finished EBRT 2/18/18 Dr. Nogueira rad onc.  \par \par She reports that she is gradually had more tightness in her bilateral shoulder region.  She describes tightness and sometimes spasms along her breast and chest wall.  She denies any swelling in her bilateral upper extremities.  She denies any overt weakness.\par \par \par \par

## 2022-02-03 NOTE — ASSESSMENT
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  Next surveillance in April 2022.\par \par Follow up in 1 month.

## 2022-02-04 ENCOUNTER — OUTPATIENT (OUTPATIENT)
Dept: OUTPATIENT SERVICES | Facility: HOSPITAL | Age: 47
LOS: 1 days | End: 2022-02-04
Payer: MEDICAID

## 2022-02-04 ENCOUNTER — APPOINTMENT (OUTPATIENT)
Dept: GASTROENTEROLOGY | Facility: HOSPITAL | Age: 47
End: 2022-02-04
Payer: MEDICAID

## 2022-02-04 DIAGNOSIS — Z12.11 ENCOUNTER FOR SCREENING FOR MALIGNANT NEOPLASM OF COLON: ICD-10-CM

## 2022-02-04 LAB — HCG UR QL: NEGATIVE — SIGNIFICANT CHANGE UP

## 2022-02-04 PROCEDURE — 45378 DIAGNOSTIC COLONOSCOPY: CPT

## 2022-02-04 PROCEDURE — 81025 URINE PREGNANCY TEST: CPT

## 2022-02-04 DEVICE — SYR ORISE GEL SNGL PACK: Type: IMPLANTABLE DEVICE | Status: FUNCTIONAL

## 2022-02-04 DEVICE — CLIP RESOLUTION 360 235CM: Type: IMPLANTABLE DEVICE | Status: FUNCTIONAL

## 2022-02-04 DEVICE — HEMOSPRAY HEMOSTAT ENDO 7F: Type: IMPLANTABLE DEVICE | Status: FUNCTIONAL

## 2022-02-08 ENCOUNTER — NON-APPOINTMENT (OUTPATIENT)
Age: 47
End: 2022-02-08

## 2022-02-09 ENCOUNTER — NON-APPOINTMENT (OUTPATIENT)
Age: 47
End: 2022-02-09

## 2022-02-09 DIAGNOSIS — K58.1 IRRITABLE BOWEL SYNDROME WITH CONSTIPATION: ICD-10-CM

## 2022-02-09 RX ORDER — NAPROXEN 500 MG/1
500 TABLET ORAL
Qty: 60 | Refills: 2 | Status: DISCONTINUED | COMMUNITY
Start: 2020-03-18 | End: 2022-02-09

## 2022-02-13 PROBLEM — Z20.822 ENCOUNTER FOR LABORATORY TESTING FOR COVID-19 VIRUS: Status: RESOLVED | Noted: 2022-02-01 | Resolved: 2022-02-13

## 2022-02-13 PROBLEM — R68.89 FORGETFULNESS: Status: RESOLVED | Noted: 2019-05-22 | Resolved: 2022-02-13

## 2022-02-13 PROBLEM — Z87.39 HISTORY OF JOINT PAIN: Status: RESOLVED | Noted: 2020-01-13 | Resolved: 2022-02-13

## 2022-02-13 PROBLEM — R79.82 CRP ELEVATED: Status: RESOLVED | Noted: 2020-02-26 | Resolved: 2022-02-13

## 2022-02-13 PROBLEM — Z30.431 IUD CHECK UP: Status: RESOLVED | Noted: 2021-12-28 | Resolved: 2022-02-13

## 2022-02-13 PROBLEM — Z87.2 HISTORY OF DERMATITIS: Status: RESOLVED | Noted: 2020-12-29 | Resolved: 2022-02-13

## 2022-02-13 PROBLEM — Z86.2 HISTORY OF BRUISING EASILY: Status: RESOLVED | Noted: 2021-07-02 | Resolved: 2022-02-13

## 2022-02-13 PROBLEM — N39.0 ACUTE UTI: Status: RESOLVED | Noted: 2022-01-16 | Resolved: 2022-02-13

## 2022-02-13 PROBLEM — Z87.898 HISTORY OF DIZZINESS: Status: RESOLVED | Noted: 2019-05-22 | Resolved: 2022-02-13

## 2022-02-13 PROBLEM — Z86.2 HISTORY OF ANEMIA: Status: RESOLVED | Noted: 2020-08-10 | Resolved: 2022-02-13

## 2022-02-13 PROBLEM — M25.60 DECREASED RANGE OF MOTION: Status: RESOLVED | Noted: 2020-12-08 | Resolved: 2022-02-13

## 2022-02-13 NOTE — PHYSICAL EXAM
[Appropriately responsive] : appropriately responsive [Alert] : alert [No Acute Distress] : no acute distress [Examination Of The Breasts] : a normal appearance [No Masses] : no breast masses were palpable [Labia Majora] : normal [Labia Minora] : normal [IUD String] : an IUD string was noted [Normal] : normal [Uterine Adnexae] : normal

## 2022-02-15 ENCOUNTER — APPOINTMENT (OUTPATIENT)
Dept: OBGYN | Facility: CLINIC | Age: 47
End: 2022-02-15
Payer: MEDICAID

## 2022-02-15 VITALS
HEIGHT: 62 IN | SYSTOLIC BLOOD PRESSURE: 137 MMHG | WEIGHT: 170 LBS | BODY MASS INDEX: 31.28 KG/M2 | HEART RATE: 86 BPM | DIASTOLIC BLOOD PRESSURE: 84 MMHG

## 2022-02-15 DIAGNOSIS — Z87.39 PERSONAL HISTORY OF OTHER DISEASES OF THE MUSCULOSKELETAL SYSTEM AND CONNECTIVE TISSUE: ICD-10-CM

## 2022-02-15 DIAGNOSIS — M25.60 STIFFNESS OF UNSPECIFIED JOINT, NOT ELSEWHERE CLASSIFIED: ICD-10-CM

## 2022-02-15 DIAGNOSIS — Z86.2 PERSONAL HISTORY OF DISEASES OF THE BLOOD AND BLOOD-FORMING ORGANS AND CERTAIN DISORDERS INVOLVING THE IMMUNE MECHANISM: ICD-10-CM

## 2022-02-15 DIAGNOSIS — Z87.2 PERSONAL HISTORY OF DISEASES OF THE SKIN AND SUBCUTANEOUS TISSUE: ICD-10-CM

## 2022-02-15 DIAGNOSIS — R79.82 ELEVATED C-REACTIVE PROTEIN (CRP): ICD-10-CM

## 2022-02-15 DIAGNOSIS — N83.202 UNSPECIFIED OVARIAN CYST, LEFT SIDE: ICD-10-CM

## 2022-02-15 DIAGNOSIS — R68.89 OTHER GENERAL SYMPTOMS AND SIGNS: ICD-10-CM

## 2022-02-15 DIAGNOSIS — Z01.419 ENCOUNTER FOR GYNECOLOGICAL EXAMINATION (GENERAL) (ROUTINE) W/OUT ABNORMAL FINDINGS: ICD-10-CM

## 2022-02-15 DIAGNOSIS — Z20.822 CONTACT WITH AND (SUSPECTED) EXPOSURE TO COVID-19: ICD-10-CM

## 2022-02-15 DIAGNOSIS — N39.0 URINARY TRACT INFECTION, SITE NOT SPECIFIED: ICD-10-CM

## 2022-02-15 DIAGNOSIS — Z87.898 PERSONAL HISTORY OF OTHER SPECIFIED CONDITIONS: ICD-10-CM

## 2022-02-15 DIAGNOSIS — Z30.431 ENCOUNTER FOR ROUTINE CHECKING OF INTRAUTERINE CONTRACEPTIVE DEVICE: ICD-10-CM

## 2022-02-15 PROCEDURE — 99396 PREV VISIT EST AGE 40-64: CPT

## 2022-02-15 RX ORDER — ONDANSETRON 4 MG/1
4 TABLET, ORALLY DISINTEGRATING ORAL EVERY 6 HOURS
Qty: 20 | Refills: 0 | Status: DISCONTINUED | COMMUNITY
Start: 2022-01-09 | End: 2022-02-15

## 2022-02-15 RX ORDER — POLYETHYLENE GLYCOL 3350, SODIUM CHLORIDE, SODIUM BICARBONATE AND POTASSIUM CHLORIDE WITH LEMON FLAVOR 420; 11.2; 5.72; 1.48 G/4L; G/4L; G/4L; G/4L
420 POWDER, FOR SOLUTION ORAL
Qty: 1 | Refills: 0 | Status: DISCONTINUED | COMMUNITY
Start: 2022-02-02 | End: 2022-02-15

## 2022-02-16 LAB — HPV HIGH+LOW RISK DNA PNL CVX: NOT DETECTED

## 2022-02-18 ENCOUNTER — RESULT REVIEW (OUTPATIENT)
Age: 47
End: 2022-02-18

## 2022-02-18 ENCOUNTER — APPOINTMENT (OUTPATIENT)
Dept: GASTROENTEROLOGY | Facility: CLINIC | Age: 47
End: 2022-02-18
Payer: MEDICAID

## 2022-02-18 VITALS
HEART RATE: 82 BPM | WEIGHT: 170 LBS | BODY MASS INDEX: 31.28 KG/M2 | OXYGEN SATURATION: 99 % | SYSTOLIC BLOOD PRESSURE: 110 MMHG | TEMPERATURE: 97.5 F | DIASTOLIC BLOOD PRESSURE: 80 MMHG | HEIGHT: 62 IN

## 2022-02-18 DIAGNOSIS — G89.29 LEFT LOWER QUADRANT PAIN: ICD-10-CM

## 2022-02-18 DIAGNOSIS — R10.32 LEFT LOWER QUADRANT PAIN: ICD-10-CM

## 2022-02-18 DIAGNOSIS — Q43.3 CONGENITAL MALFORMATIONS OF INTESTINAL FIXATION: ICD-10-CM

## 2022-02-18 PROCEDURE — 99214 OFFICE O/P EST MOD 30 MIN: CPT

## 2022-02-18 NOTE — HISTORY OF PRESENT ILLNESS
[de-identified] : Dr. 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

## 2022-02-18 NOTE — ASSESSMENT
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

## 2022-02-18 NOTE — REASON FOR VISIT
[FreeTextEntry1] : Abdominal discomfort, left lower quadrant, malrotated small bowel, constipation, constipation better with Amitiza but not discomfort

## 2022-02-23 LAB — CYTOLOGY CVX/VAG DOC THIN PREP: NORMAL

## 2022-02-28 ENCOUNTER — NON-APPOINTMENT (OUTPATIENT)
Age: 47
End: 2022-02-28

## 2022-03-01 ENCOUNTER — APPOINTMENT (OUTPATIENT)
Dept: RHEUMATOLOGY | Facility: CLINIC | Age: 47
End: 2022-03-01

## 2022-03-08 ENCOUNTER — APPOINTMENT (OUTPATIENT)
Dept: GASTROENTEROLOGY | Facility: CLINIC | Age: 47
End: 2022-03-08
Payer: MEDICAID

## 2022-03-08 ENCOUNTER — APPOINTMENT (OUTPATIENT)
Dept: GASTROENTEROLOGY | Facility: HOSPITAL | Age: 47
End: 2022-03-08

## 2022-03-08 ENCOUNTER — APPOINTMENT (OUTPATIENT)
Dept: PHYSICAL MEDICINE AND REHAB | Facility: CLINIC | Age: 47
End: 2022-03-08
Payer: MEDICAID

## 2022-03-08 PROCEDURE — 99214 OFFICE O/P EST MOD 30 MIN: CPT

## 2022-03-08 PROCEDURE — 91110 GI TRC IMG INTRAL ESOPH-ILE: CPT

## 2022-03-08 NOTE — HISTORY OF PRESENT ILLNESS
[FreeTextEntry1] : Ms. Flowers is a 46 year old female with history of bilateral breast cancer.  8/11/17 S/p bilat BCS with R SLN, bilat breast reduction, oncoplastic surgery and biozorb insertion on for RIGHT UIQ [id number] Infiltrating ductal Cancer, margins 1.2 cm, Gr 2. LN 1/4, ER/ND positive and Wwd2uvy neg.  S/p L SLNBx due to microinvasion on final path 10/13/17.  Started tamoxifen 10/24/17.  Finished EBRT 2/18/18 Dr. Nogueira rad onc.  \par \par She reports since her last visit he started gabapentin.  She states that this is helping some with her pain.  She feels a little tired with it but denies any overt side effects.  She has not yet started physical therapy.  She reports some difficulty still with overhead activities and some shoulder pain.  She is currently seeing her rheumatologist.  Denies any swelling or edema in her extremities.\par \par \par

## 2022-03-08 NOTE — DATA REVIEWED
[FreeTextEntry1] : X-ray right shoulder November 2021: Mild enthesopathic changes on greater tuberosity of the humerus.\par \par X-ray left shoulder November 2021: Mild enthesopathic changes of the greater tuberosity of the humerus

## 2022-03-08 NOTE — PHYSICAL EXAM
[FreeTextEntry1] : Gen: Patient is A&O x 3, NAD\par HEENT: EOMI, hearing grossly normal\par Resp: regular, non - labored\par CV: pulses regular\par Skin: no rashes, erythema\par Lymph: no clubbing, cyanosis, edema, or palpable lymphadenopathy\par Inspection: no instability or misalignment\par ROM: full throughout\par Palpation: TTP bilateral pectoral muscles \par Sensation: intact to light touch\par Reflexes: 1+ and symmetric throughout\par Strength: 5/5 throughout\par Special tests: +Hawkin's sign bilateral \par Gait: normal, non-antalgic\par \par

## 2022-03-08 NOTE — ASSESSMENT
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 \par -Start Breast rehabilitation program with focus on pectoral muscle stretching and myofascial release/manual therapy to chest wall and pectoral muscles\par \par #Neuropathic Pain:\par -Continue gabapentin 100mg TID, CMP reviewed Cr 0.71, can consider titration at next visit, refill sent\par \par #Bilateral Shoulder Pain:\par -Right shoulder x-ray reviewed\par -Left shoulder x-ray reviewed\par -Rheumatology notes reviewed, holding Sulfasalazine until GI follow up \par -Discussed risks and benefits of corticosteroid injection if no improvement with therapy  \par \par #At risk for lymphedema:\par -No no clinical signs of lymphedema on exam.\par -Given patient's radiation fields recommend continued surveillance every 3 months, for right upper extremity.  Next surveillance in April 2022.\par \par Follow up in 1 month.

## 2022-03-09 ENCOUNTER — NON-APPOINTMENT (OUTPATIENT)
Age: 47
End: 2022-03-09

## 2022-03-22 ENCOUNTER — APPOINTMENT (OUTPATIENT)
Dept: RHEUMATOLOGY | Facility: CLINIC | Age: 47
End: 2022-03-22
Payer: MEDICAID

## 2022-03-22 VITALS
HEART RATE: 85 BPM | HEIGHT: 62 IN | RESPIRATION RATE: 17 BRPM | WEIGHT: 170 LBS | TEMPERATURE: 97.8 F | OXYGEN SATURATION: 99 % | BODY MASS INDEX: 31.28 KG/M2 | DIASTOLIC BLOOD PRESSURE: 80 MMHG | SYSTOLIC BLOOD PRESSURE: 130 MMHG

## 2022-03-22 PROCEDURE — 99215 OFFICE O/P EST HI 40 MIN: CPT

## 2022-03-22 NOTE — ASSESSMENT
[FreeTextEntry1] : 46 year old female with polyarticular joint pains and low back pain and found to have (+)[id number] as well as diffuse enthesopathy - suggestive of undifferentiated spondyloarthropathy.  MRI L-spine reveals degenerative changes / no evidence of spondylitis.  MRI pelvis w/ no evidence of sacroiliitis.  Symptoms had been improving on sulfasalazine, but it then lost effect.  Had mild improvement on MTX, but did not tolerate it (abd pain/diarrhea). Had improved overall on MTX SC (Rasuvo) + sulfasalazine, but pt experiencing abd pain of unclear etiology - possibly due to SSZ.  Unable to use biologics given recent hx of breast CA.  Also w/ persistent severe right shoulder pain of unclear etiology.\par   - Cont Rasuvo 20mg SC weekly, folic acid 1mg daily.\par   - D/C sulfasalazine, start lelunomide 10mg daily - will plan to titrate up to 20mg daily as tolerated.\par   - Hepatitis panel negative 11/21.  Quantiferon negative 4/2021.\par   - Flu vaccine (10/21), Pneumovax (10/21), Prevnar (8/20) UTD.  Pt has received the COVID19 vaccine + booster.\par   - Cont diclofenac 75mg BID prn for now.\par   - Reiterated importance of exercise.\par   - warm compresses\par   - OTC topical analgesics.\par   - Check labs.\par   - Awaiting prior auth for MRI right shoulder.\par

## 2022-03-22 NOTE — PHYSICAL EXAM
[General Appearance - Alert] : alert [General Appearance - In No Acute Distress] : in no acute distress [Sclera] : the sclera and conjunctiva were normal [Outer Ear] : the ears and nose were normal in appearance [Oropharynx] : the oropharynx was normal [Neck Appearance] : the appearance of the neck was normal [Neck Cervical Mass (___cm)] : no neck mass was observed [Jugular Venous Distention Increased] : there was no jugular-venous distention [Thyroid Diffuse Enlargement] : the thyroid was not enlarged [Thyroid Nodule] : there were no palpable thyroid nodules [Auscultation Breath Sounds / Voice Sounds] : lungs were clear to auscultation bilaterally [Heart Sounds] : normal S1 and S2 [Heart Rate And Rhythm] : heart rate was normal and rhythm regular [Heart Sounds Gallop] : no gallops [Murmurs] : no murmurs [Heart Sounds Pericardial Friction Rub] : no pericardial rub [Edema] : there was no peripheral edema [Bowel Sounds] : normal bowel sounds [Abdomen Soft] : soft [Abdomen Tenderness] : non-tender [Abdomen Mass (___ Cm)] : no abdominal mass palpated [Cervical Lymph Nodes Enlarged Posterior Bilaterally] : posterior cervical [Cervical Lymph Nodes Enlarged Anterior Bilaterally] : anterior cervical [Supraclavicular Lymph Nodes Enlarged Bilaterally] : supraclavicular [Skin Color & Pigmentation] : normal skin color and pigmentation [Skin Turgor] : normal skin turgor [] : no rash [No Focal Deficits] : no focal deficits [Oriented To Time, Place, And Person] : oriented to person, place, and time [Impaired Insight] : insight and judgment were intact [Affect] : the affect was normal [FreeTextEntry1] : (+)mild swelling in her B/L PIP's; right shoulder w/ pain upon abduction and internal rotation

## 2022-03-22 NOTE — HISTORY OF PRESENT ILLNESS
[___ Month(s) Ago] : [unfilled] month(s) ago [Fatigue] : fatigue [Arthralgias] : arthralgias [Myalgias] : myalgias [FreeTextEntry1] : Pt had been feeling better overall on MTX + SSZ, but has been experiencing abd pain - she stopped taking SSZ and the abd pain improved, though she experienced another episode yesterday despite being off SSZ x 1 week.   She also gets relief from diclofenac.  Has been going to PT for lymphedema,  Currently still w/ severe LBP.   Joint pains remain improved overall, though w/ intermittent episodes of severe pain.  No new complaints.\par Pt feeling better overall since starting SSZ - joints have improved, wesly her shoulders, though she c/o LBP, worst lying in bed at nights - often wakes her up.  No new complaints. [Anorexia] : no anorexia [Weight Loss] : no weight loss [Malaise] : no malaise [Fever] : no fever [Chills] : no chills [Malar Facial Rash] : no malar facial rash [Skin Lesions] : no lesions [Skin Nodules] : no skin nodules [Oral Ulcers] : no oral ulcers [Cough] : no cough [Dry Mouth] : no dry mouth [Dysphonia] : no dysphonia [Dysphagia] : no dysphagia [Shortness of Breath] : no shortness of breath [Chest Pain] : no chest pain [Joint Swelling] : no joint swelling [Joint Warmth] : no joint warmth [Joint Deformity] : no joint deformity [Decreased ROM] : no decreased range of motion [Morning Stiffness] : no morning stiffness [Falls] : no falls [Difficulty Standing] : no difficulty standing [Difficulty Walking] : no difficulty walking [Dyspnea] : no dyspnea [Muscle Weakness] : no muscle weakness [Muscle Spasms] : no muscle spasms [Muscle Cramping] : no muscle cramping [Visual Changes] : no visual changes [Eye Pain] : no eye pain [Eye Redness] : no eye redness [Dry Eyes] : no dry eyes

## 2022-03-28 NOTE — ED PROVIDER NOTE - CARDIOVASCULAR NEGATIVE STATEMENT, MLM
Last Appointment:  10/13/2021  Future Appointments   Date Time Provider Jorje Hoodi   10/17/2022  1:00 PM [address] [facility], [address] [facility] Visiting Nurses called patient will be discharged from hospital with orders for visiting nurses. I have verbal okay for services and requested all orders be faxed for your signature.     Electronically signed by Gilbert Villegas LPN on 1/88/0234 at 56:63 PM
Okay
no chest pain and no edema.

## 2022-03-30 ENCOUNTER — NON-APPOINTMENT (OUTPATIENT)
Age: 47
End: 2022-03-30

## 2022-03-31 ENCOUNTER — APPOINTMENT (OUTPATIENT)
Dept: FAMILY MEDICINE | Facility: CLINIC | Age: 47
End: 2022-03-31
Payer: MEDICAID

## 2022-03-31 VITALS
SYSTOLIC BLOOD PRESSURE: 124 MMHG | HEART RATE: 88 BPM | HEIGHT: 62 IN | DIASTOLIC BLOOD PRESSURE: 78 MMHG | TEMPERATURE: 98.2 F | BODY MASS INDEX: 31.28 KG/M2 | OXYGEN SATURATION: 98 % | WEIGHT: 170 LBS

## 2022-03-31 DIAGNOSIS — N64.89 OTHER SPECIFIED DISORDERS OF BREAST: ICD-10-CM

## 2022-03-31 DIAGNOSIS — M67.819 OTHER SPECIFIED DISORDERS OF SYNOVIUM AND TENDON, UNSPECIFIED SHOULDER: ICD-10-CM

## 2022-03-31 DIAGNOSIS — M25.50 PAIN IN UNSPECIFIED JOINT: ICD-10-CM

## 2022-03-31 DIAGNOSIS — R68.89 OTHER GENERAL SYMPTOMS AND SIGNS: ICD-10-CM

## 2022-03-31 DIAGNOSIS — R10.32 LEFT LOWER QUADRANT PAIN: ICD-10-CM

## 2022-03-31 DIAGNOSIS — E66.9 OBESITY, UNSPECIFIED: ICD-10-CM

## 2022-03-31 DIAGNOSIS — R06.02 SHORTNESS OF BREATH: ICD-10-CM

## 2022-03-31 DIAGNOSIS — R23.8 OTHER SKIN CHANGES: ICD-10-CM

## 2022-03-31 DIAGNOSIS — M25.511 PAIN IN RIGHT SHOULDER: ICD-10-CM

## 2022-03-31 DIAGNOSIS — R10.814 LEFT LOWER QUADRANT ABDOMINAL TENDERNESS: ICD-10-CM

## 2022-03-31 PROCEDURE — 93000 ELECTROCARDIOGRAM COMPLETE: CPT

## 2022-03-31 PROCEDURE — 99215 OFFICE O/P EST HI 40 MIN: CPT | Mod: 25

## 2022-03-31 RX ORDER — METOCLOPRAMIDE 10 MG/1
10 TABLET ORAL
Qty: 1 | Refills: 0 | Status: DISCONTINUED | COMMUNITY
Start: 2022-02-18 | End: 2022-03-31

## 2022-03-31 RX ORDER — CEFPODOXIME PROXETIL 200 MG/1
200 TABLET, FILM COATED ORAL
Qty: 20 | Refills: 0 | Status: DISCONTINUED | COMMUNITY
Start: 2022-01-14 | End: 2022-03-31

## 2022-03-31 RX ORDER — POLYETHYLENE GLYCOL 3350, SODIUM CHLORIDE, SODIUM BICARBONATE AND POTASSIUM CHLORIDE WITH LEMON FLAVOR 420; 11.2; 5.72; 1.48 G/4L; G/4L; G/4L; G/4L
420 POWDER, FOR SOLUTION ORAL
Qty: 1 | Refills: 0 | Status: DISCONTINUED | COMMUNITY
Start: 2022-01-05 | End: 2022-03-31

## 2022-03-31 NOTE — HISTORY OF PRESENT ILLNESS
[FreeTextEntry1] : pt comes in w/ various medical concerns, MRI shoulder report, c/o persistent rt shoulder pain,  and requests for referrals; updates me on multiple medical conditions; seeking new GI\par c/o forgetfulness\par hx of breast ca, RT, RA vs autoimmune disorder, joint pain, on methotrexate and dmard\par now c/o SOB w/ exertion, notices when hiking, climbing a hill, going up the stairs, states started few mths ago

## 2022-03-31 NOTE — PHYSICAL EXAM
[No Acute Distress] : no acute distress [Normal Sclera/Conjunctiva] : normal sclera/conjunctiva [EOMI] : extraocular movements intact [Normal Outer Ear/Nose] : the outer ears and nose were normal in appearance [Normal] : normal rate, regular rhythm, normal S1 and S2 and no murmur heard [Coordination Grossly Intact] : coordination grossly intact [Normal Gait] : normal gait [Normal Affect] : the affect was normal [Alert and Oriented x3] : oriented to person, place, and time [Normal Insight/Judgement] : insight and judgment were intact [de-identified] : obese

## 2022-04-07 ENCOUNTER — APPOINTMENT (OUTPATIENT)
Dept: RADIOLOGY | Facility: HOSPITAL | Age: 47
End: 2022-04-07
Payer: MEDICAID

## 2022-04-07 ENCOUNTER — NON-APPOINTMENT (OUTPATIENT)
Age: 47
End: 2022-04-07

## 2022-04-07 ENCOUNTER — OUTPATIENT (OUTPATIENT)
Dept: OUTPATIENT SERVICES | Facility: HOSPITAL | Age: 47
LOS: 1 days | End: 2022-04-07

## 2022-04-07 DIAGNOSIS — Q43.3 CONGENITAL MALFORMATIONS OF INTESTINAL FIXATION: ICD-10-CM

## 2022-04-07 PROCEDURE — 74250 X-RAY XM SM INT 1CNTRST STD: CPT | Mod: 26

## 2022-04-08 ENCOUNTER — TRANSCRIPTION ENCOUNTER (OUTPATIENT)
Age: 47
End: 2022-04-08

## 2022-04-08 ENCOUNTER — NON-APPOINTMENT (OUTPATIENT)
Age: 47
End: 2022-04-08

## 2022-04-11 ENCOUNTER — NON-APPOINTMENT (OUTPATIENT)
Age: 47
End: 2022-04-11

## 2022-04-11 ENCOUNTER — APPOINTMENT (OUTPATIENT)
Dept: PHYSICAL MEDICINE AND REHAB | Facility: CLINIC | Age: 47
End: 2022-04-11

## 2022-04-11 ENCOUNTER — APPOINTMENT (OUTPATIENT)
Dept: PHYSICAL MEDICINE AND REHAB | Facility: CLINIC | Age: 47
End: 2022-04-11
Payer: MEDICAID

## 2022-04-11 PROCEDURE — 99214 OFFICE O/P EST MOD 30 MIN: CPT | Mod: 95

## 2022-04-11 NOTE — HISTORY OF PRESENT ILLNESS
[FreeTextEntry1] : This visit was provided via Telehealth using real-time 2 way audio visual technology. The Patient, Yumi Flowers, was located at HOME,  ADDRESS, at the time of the visit. The provider Cyril Dugan DO was located at the medical office located in [place] at the time of the visit. The patient Yumi Flowers and provider participated in the telehealth encounter. Verbal consent for telehealth was given on 4/11/22 by the patient Yumi Flowers\par \par Ms. Flowers is a 46 year old female with history of bilateral breast cancer.  8/11/17 S/p bilat BCS with R SLN, bilat breast reduction, oncoplastic surgery and biozorb insertion on for RIGHT UIQ [id number] Infiltrating ductal Cancer, margins 1.2 cm, Gr 2. LN 1/4, ER/NE positive and Obv0ewx neg.  S/p L SLNBx due to microinvasion on final path 10/13/17.  Started tamoxifen 10/24/17.  Finished EBRT 2/18/18 Dr. Nogueira rad onc. \par \par She reports she has been performing physical therapy to the chest wall region which was helping with her tightness symptoms.  She recently had MRI demonstrating rotator cuff tear and is going to see orthopedics.  She was taking gabapentin thinks is helping, currently on hold while she is being treated for Covid.  Reports she has had swelling coming and going in her arms but thinks this may be related to her recent infection denies any swelling currently.  Denies any redness or erythema.\par \par \par \par

## 2022-04-11 NOTE — PHYSICAL EXAM
[FreeTextEntry1] : Virtual exam: \par Gen: Patient is A&O x 3, NAD\par HEENT: EOMI, hearing grossly normal\par Resp: regular, non - labored\par CV: No cyanosis \par Skin: no rashes, erythema\par Lymph: no clubbing, cyanosis, edema, or palpable lymphadenopathy\par Inspection: no instability or misalignment\par ROM: Limited in right shoulder abduction ~120 degrees \par Strength: appears anti-gravity \par Gait: normal, non-antalgic\par \par

## 2022-04-11 NOTE — ASSESSMENT
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  Next surveillance at next in person visit.  \par \par Follow up in 1 month.

## 2022-04-11 NOTE — DATA REVIEWED
[FreeTextEntry1] : MRI Right shoulder March 2022: Multifocal rotator cuff tendinosis with full-thickness complete tear of supraspinatus tendon with partial retraction of the tendon.\par

## 2022-04-12 ENCOUNTER — APPOINTMENT (OUTPATIENT)
Dept: ORTHOPEDIC SURGERY | Facility: CLINIC | Age: 47
End: 2022-04-12
Payer: MEDICAID

## 2022-04-12 ENCOUNTER — APPOINTMENT (OUTPATIENT)
Dept: FAMILY MEDICINE | Facility: CLINIC | Age: 47
End: 2022-04-12
Payer: MEDICAID

## 2022-04-12 DIAGNOSIS — R52 PAIN, UNSPECIFIED: ICD-10-CM

## 2022-04-12 PROCEDURE — 99212 OFFICE O/P EST SF 10 MIN: CPT | Mod: 95

## 2022-04-12 NOTE — HISTORY OF PRESENT ILLNESS
[Home] : at home, [unfilled] , at the time of the visit. [Medical Office: ([facility])___] : at the medical office located in  [Verbal consent obtained from patient] : the patient, [unfilled] [FreeTextEntry1] : recently dx'd w/ COVID, bodyache symptoms started last wednesday (6 days ago), UC started pt on paxlovid, off immunosuppressants\par feeling better today, except tickle in throat\par had questions about quarantine

## 2022-04-18 ENCOUNTER — APPOINTMENT (OUTPATIENT)
Dept: OBGYN | Facility: CLINIC | Age: 47
End: 2022-04-18
Payer: MEDICAID

## 2022-04-18 ENCOUNTER — ASOB RESULT (OUTPATIENT)
Age: 47
End: 2022-04-18

## 2022-04-18 PROCEDURE — 76830 TRANSVAGINAL US NON-OB: CPT

## 2022-04-19 ENCOUNTER — NON-APPOINTMENT (OUTPATIENT)
Age: 47
End: 2022-04-19

## 2022-04-26 ENCOUNTER — APPOINTMENT (OUTPATIENT)
Dept: ORTHOPEDIC SURGERY | Facility: CLINIC | Age: 47
End: 2022-04-26
Payer: MEDICAID

## 2022-04-26 ENCOUNTER — APPOINTMENT (OUTPATIENT)
Dept: RHEUMATOLOGY | Facility: CLINIC | Age: 47
End: 2022-04-26
Payer: MEDICAID

## 2022-04-26 VITALS
HEART RATE: 93 BPM | HEIGHT: 62 IN | DIASTOLIC BLOOD PRESSURE: 78 MMHG | RESPIRATION RATE: 17 BRPM | OXYGEN SATURATION: 97 % | SYSTOLIC BLOOD PRESSURE: 126 MMHG | TEMPERATURE: 97.3 F

## 2022-04-26 PROCEDURE — 99215 OFFICE O/P EST HI 40 MIN: CPT

## 2022-04-26 PROCEDURE — 99203 OFFICE O/P NEW LOW 30 MIN: CPT

## 2022-04-26 NOTE — DISCUSSION/SUMMARY
[de-identified] : 46-year-old female with right shoulder rotator cuff tear possible SLAP tear.  She sustained an injury with a fall 3 months ago.  Possibly traumatic tear.  We discussed treatment options at length both surgical and nonsurgical.  We discussed nonsurgical treatment with activity modification physical therapy medications injections.  We discussed surgical treatment.  Right shoulder arthroscopy rotator cuff repair possible biceps tenodesis.  We discussed the surgery postoperative protocol and restrictions at length.  Risks benefits alternatives discussed including but not limited to risks such as bleeding infection nerve and vessel injury continued pain stiffness need for future surgery medical complications such as DVT or PE and anesthesia complications.  All questions were answered she will schedule at her convenience

## 2022-04-26 NOTE — ASSESSMENT
[FreeTextEntry1] : 46 year old female with polyarticular joint pains and low back pain and found to have (+)HLA-B27 as well as diffuse enthesopathy - suggestive of undifferentiated spondyloarthropathy.  MRI L-spine reveals degenerative changes / no evidence of spondylitis.  MRI pelvis w/ no evidence of sacroiliitis.  Symptoms had been improving on sulfasalazine, but it then lost effect.  Had mild improvement on MTX, but did not tolerate it (abd pain/diarrhea). Had improved overall on MTX SC (Rasuvo) + sulfasalazine, but pt experiencing abd pain of unclear etiology - possibly due to SSZ.  Now somewhat improvewd on Rasuvo + lefluomide.  Unable to use biologics given recent hx of breast CA.  Also w/ persistent severe right shoulder pain - MRI revealed full thickness tear of the supraspinatus.\par   - Cont Rasuvo 20mg SC weekly, folic acid 1mg daily.\par   - Increase leflunomide to 20mg daily.\par   - Rep[eat labs in 1 month.\par   - Hepatitis panel negative 11/21.  Quantiferon negative 4/2021.\par   - Flu vaccine (10/21), Pneumovax (10/21), Prevnar (8/20) UTD.  Pt has received the COVID19 vaccine + booster.\par   - Cont diclofenac 75mg BID prn for now.\par   - Reiterated importance of exercise.\par   - warm compresses\par   - OTC topical analgesics.\par   - Ortho f/u - planning right shoulder surgery.\par \par

## 2022-04-26 NOTE — HISTORY OF PRESENT ILLNESS
[___ Month(s) Ago] : [unfilled] month(s) ago [Fatigue] : fatigue [Arthralgias] : arthralgias [Myalgias] : myalgias [FreeTextEntry1] : Still w/ polyarticular joint pains, though now more intermittent - has good days and bad days.  Pt contracted COVID19 about 3 weeks ago - now resolved.  Pt saw orthopedics, who is planning right shoulder surgery.\par  [Anorexia] : no anorexia [Weight Loss] : no weight loss [Malaise] : no malaise [Fever] : no fever [Chills] : no chills [Malar Facial Rash] : no malar facial rash [Skin Lesions] : no lesions [Skin Nodules] : no skin nodules [Oral Ulcers] : no oral ulcers [Cough] : no cough [Dry Mouth] : no dry mouth [Dysphonia] : no dysphonia [Dysphagia] : no dysphagia [Shortness of Breath] : no shortness of breath [Chest Pain] : no chest pain [Joint Swelling] : no joint swelling [Joint Warmth] : no joint warmth [Joint Deformity] : no joint deformity [Decreased ROM] : no decreased range of motion [Morning Stiffness] : no morning stiffness [Falls] : no falls [Difficulty Standing] : no difficulty standing [Difficulty Walking] : no difficulty walking [Dyspnea] : no dyspnea [Muscle Weakness] : no muscle weakness [Muscle Spasms] : no muscle spasms [Muscle Cramping] : no muscle cramping [Visual Changes] : no visual changes [Eye Pain] : no eye pain [Eye Redness] : no eye redness [Dry Eyes] : no dry eyes

## 2022-04-26 NOTE — PHYSICAL EXAM
[General Appearance - Alert] : alert [General Appearance - In No Acute Distress] : in no acute distress [Sclera] : the sclera and conjunctiva were normal [Outer Ear] : the ears and nose were normal in appearance [Oropharynx] : the oropharynx was normal [Neck Appearance] : the appearance of the neck was normal [Neck Cervical Mass (___cm)] : no neck mass was observed [Jugular Venous Distention Increased] : there was no jugular-venous distention [Thyroid Diffuse Enlargement] : the thyroid was not enlarged [Thyroid Nodule] : there were no palpable thyroid nodules [Auscultation Breath Sounds / Voice Sounds] : lungs were clear to auscultation bilaterally [Heart Rate And Rhythm] : heart rate was normal and rhythm regular [Heart Sounds] : normal S1 and S2 [Heart Sounds Gallop] : no gallops [Murmurs] : no murmurs [Heart Sounds Pericardial Friction Rub] : no pericardial rub [Edema] : there was no peripheral edema [Bowel Sounds] : normal bowel sounds [Abdomen Soft] : soft [Abdomen Tenderness] : non-tender [Abdomen Mass (___ Cm)] : no abdominal mass palpated [Cervical Lymph Nodes Enlarged Posterior Bilaterally] : posterior cervical [Cervical Lymph Nodes Enlarged Anterior Bilaterally] : anterior cervical [Supraclavicular Lymph Nodes Enlarged Bilaterally] : supraclavicular [Skin Color & Pigmentation] : normal skin color and pigmentation [Skin Turgor] : normal skin turgor [] : no rash [No Focal Deficits] : no focal deficits [Oriented To Time, Place, And Person] : oriented to person, place, and time [Impaired Insight] : insight and judgment were intact [Affect] : the affect was normal [FreeTextEntry1] : (+)mild swelling in her B/L 2nd-5th MCP's; (+)tenderness in right 2nd-5th PIP's, B/L 2nd-5th MCP's;  right shoulder w/ painful decreased abduction and internal rotation

## 2022-04-26 NOTE — PHYSICAL EXAM
[de-identified] : General Exam\par \par Well developed, well nourished\par No apparent distress\par Oriented to person, place, and time\par Mood: Normal\par Affect: Normal\par Balance and coordination: Normal\par Gait: Normal\par \par Right shoulder exam\par \par Inspection: No swelling, ecchymosis or gross deformity.\par Skin: No masses, No lesions\par Tenderness: No bicipital tenderness, no tenderness to the greater tuberosity/RTC insertion, no anterior shoulder/lesser tuberosity tenderness. No tenderness SC joint, clavicle, AC joint.\par ROM: 160/60/T6\par Impingement tests: Positive Luna\par AC Joint: no pain with cross arm testing\par Biceps: Negative speed\par Strength: 5-/5 abduction, 5/5 external rotation, and internal rotation\par Neuro: AIN, PIN, Ulnar nerve motor intact\par Sensation: Intact to light touch in radial, median, ulnar, and axillary nerve distributions\par Vasc: 2+ radial pulse\par  [de-identified] : Radiographs and MRI of the right shoulder obtained previously were reviewed.  There is a full-thickness tear of the supraspinatus tendon with retraction to the mid humeral head.  Possible SLAP tear.

## 2022-04-26 NOTE — HISTORY OF PRESENT ILLNESS
[de-identified] : \par 46-year-old female chief complaint of right shoulder pain for several months.  She reports a fall 3 months ago onto her right shoulder.  She has been under the care of rheumatology for which she takes methotrexate.  She reports significant weakness and pain in her shoulder she is tried a period of rest medications physical therapy exercises without relief\par The patient's past medical history, past surgical history, medications, allergies, and social history were reviewed by me today with the patient and documented accordingly. In addition, the patient's family history, which is noncontributory to this visit, was also reviewed.\par

## 2022-04-27 ENCOUNTER — OUTPATIENT (OUTPATIENT)
Dept: OUTPATIENT SERVICES | Facility: HOSPITAL | Age: 47
LOS: 1 days | Discharge: HOME | End: 2022-04-27

## 2022-04-27 DIAGNOSIS — C50.919 MALIGNANT NEOPLASM OF UNSPECIFIED SITE OF UNSPECIFIED FEMALE BREAST: ICD-10-CM

## 2022-04-28 ENCOUNTER — APPOINTMENT (OUTPATIENT)
Dept: CARDIOLOGY | Facility: CLINIC | Age: 47
End: 2022-04-28
Payer: MEDICAID

## 2022-04-28 ENCOUNTER — NON-APPOINTMENT (OUTPATIENT)
Age: 47
End: 2022-04-28

## 2022-04-28 VITALS
SYSTOLIC BLOOD PRESSURE: 128 MMHG | OXYGEN SATURATION: 99 % | WEIGHT: 168 LBS | HEART RATE: 97 BPM | TEMPERATURE: 97.9 F | DIASTOLIC BLOOD PRESSURE: 80 MMHG | BODY MASS INDEX: 30.91 KG/M2 | HEIGHT: 62 IN

## 2022-04-28 DIAGNOSIS — R06.00 DYSPNEA, UNSPECIFIED: ICD-10-CM

## 2022-04-28 PROCEDURE — 99204 OFFICE O/P NEW MOD 45 MIN: CPT

## 2022-04-28 PROCEDURE — 93000 ELECTROCARDIOGRAM COMPLETE: CPT

## 2022-04-28 RX ORDER — SULFASALAZINE 500 MG/1
500 TABLET ORAL
Qty: 126 | Refills: 0 | Status: COMPLETED | COMMUNITY
Start: 2020-12-14 | End: 2022-04-28

## 2022-04-28 NOTE — DISCUSSION/SUMMARY
[FreeTextEntry1] : 46F presents to establish CV care\par \par 1. CP/DELGADO\par -pt with classic symptoms, with a worsening over the past few months\par -would r/o ischemia here\par -steve check tte to eval for structural hert disease\par -check exercise stress test\par -pt states upcoming should sx in a month, pt told to hold off on sx until further cardiac testing is complete.

## 2022-04-28 NOTE — REVIEW OF SYSTEMS
[Feeling Fatigued] : feeling fatigued [Dyspnea on exertion] : dyspnea during exertion [Chest Discomfort] : chest discomfort [Dizziness] : dizziness [Easy Bruising] : a tendency for easy bruising [Fever] : no fever [Headache] : no headache [Weight Gain (___ Lbs)] : no recent weight gain [Chills] : no chills [Weight Loss (___ Lbs)] : no recent weight loss [Sore Throat] : no sore throat [Lower Ext Edema] : no extremity edema [Palpitations] : no palpitations [Syncope] : no syncope [Cough] : no cough [Wheezing] : no wheezing [Nausea] : no nausea [Vomiting] : no vomiting [Confusion] : no confusion was observed [Easy Bleeding] : no tendency for easy bleeding

## 2022-04-28 NOTE — HISTORY OF PRESENT ILLNESS
[FreeTextEntry1] : 46F presents to establish cardiovascular care\par Sent in by: Dr Stein\par PMD: lottie Herrmann. \par  \par pt states she has been on immunosuppressive therapy for several years. pt states when she walks up the stairs she has been experiencing cp and sob. pt states symptoms have been getting progressively worse since that time. pt states she has to stop and rest ad then she may continue whatever she was doing. \par pt states decreased exercise tolerance over the past few months as well. \par \par Pt states occasional palpitations, once in the past 2-3 months\par pt states chronic dizziness since having breast CA. \par denies syncope. pt denies LE edema, orthopnea \par \par \par pt had covid 4/22.\par \par Exercise: walk daily, pilates. +sob if heavy exertion. \par Diet: none\par \par Prior cardiac workup: none\par Recent labs:\par  \par EKG: SR 94\par \par  \par Med hx: hx of breast CA, ? ankylosing spondylitis. \par OBGYN hx: 2 children, +gest dm. regular menstrual cycles. No hx of miscarriage. \par Sx hx: breast lumpectomy\par Fam hx: breast, lung, pancreatic CA, leukemia. \par Social hx: lives in [place], with sons. (recently  11/21). not working currently. no tob. wine several times a week. no drugs. \par Meds: gabapentin, leflunomide (immunosupressant for RA) tamoxifen methotrexate. \par Allergies: nkda\par

## 2022-05-03 ENCOUNTER — APPOINTMENT (OUTPATIENT)
Dept: HEMATOLOGY ONCOLOGY | Facility: CLINIC | Age: 47
End: 2022-05-03
Payer: MEDICAID

## 2022-05-03 ENCOUNTER — APPOINTMENT (OUTPATIENT)
Dept: CARDIOLOGY | Facility: CLINIC | Age: 47
End: 2022-05-03
Payer: MEDICAID

## 2022-05-03 VITALS
BODY MASS INDEX: 30.52 KG/M2 | RESPIRATION RATE: 16 BRPM | DIASTOLIC BLOOD PRESSURE: 91 MMHG | WEIGHT: 167.99 LBS | HEIGHT: 62.01 IN | HEART RATE: 89 BPM | OXYGEN SATURATION: 98 % | SYSTOLIC BLOOD PRESSURE: 146 MMHG | TEMPERATURE: 97.7 F

## 2022-05-03 PROCEDURE — 99214 OFFICE O/P EST MOD 30 MIN: CPT

## 2022-05-03 PROCEDURE — 93306 TTE W/DOPPLER COMPLETE: CPT

## 2022-05-03 RX ORDER — LUBIPROSTONE 24 UG/1
24 CAPSULE, GELATIN COATED ORAL TWICE DAILY
Qty: 60 | Refills: 2 | Status: DISCONTINUED | COMMUNITY
Start: 2022-02-09 | End: 2022-05-03

## 2022-05-03 NOTE — REVIEW OF SYSTEMS
[Joint Pain] : joint pain [Negative] : Endocrine [Fever] : no fever [Chills] : no chills [Night Sweats] : no night sweats [Recent Change In Weight] : ~T no recent weight change [Abdominal Pain] : no abdominal pain [Vomiting] : no vomiting [Constipation] : no constipation [Diarrhea] : no diarrhea [FreeTextEntry2] : as above [FreeTextEntry7] : as above [FreeTextEntry9] : as above

## 2022-05-03 NOTE — HISTORY OF PRESENT ILLNESS
[de-identified] : Ms. Flowers is a [place] female who on June 2, 2017 saw her gynecologist, Dr. Corrigan, as she had menses twice in the same month and subsequently felt tired. She was then referred for routine screening mammogram, which was performed on June 5, 2017 with the finding of a right breast mass with associated architectural distortion suspicious for malignancy with ultrasound-guided core biopsy recommended. The patient also consequently had a bilateral breast ultrasound on the same date with a finding of a hypoechoic mass with irregular margins and posterior shadowing at the 1 o'clock axis of the right breast corresponding to the findings on the mammogram with no further suspicious findings; ultrasound-guided core biopsy was recommended. She ultimately had an ultrasound-guided core biopsy performed on June 12, 2017 with a finding of invasive moderately differentiated duct carcinoma, with the largest focus of invasive carcinoma measuring up to 5 mm with evidence of DCIS, intermediate nuclear grade, solid and cribriform types, with no evidence of lymphovascular invasion, ER positive (77%), AL positive (83%), and HER-2/carmen (1+) and negative. The patient then went on to have a bilateral breast MRI with the right breast showing, within the upper inner quadrant, a spiculated enhancing mass corresponding to the biopsy-proven cancer measuring up to 2 cm in the mediolateral direction, and 1.7 cm in the anterior-posterior direction, and 1.5 cm in the craniocaudal direction; left breast showed, within the upper outer quadrant, a somewhat serpiginous area of enhancement with the more anterior aspect becoming more nodular and measuring up to 1.1 cm in size, with additional scattered nonspecific enhancing foci; the axillae were unremarkable. 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 The patient subsequently was seen with complaint of pain and burning sensation and a new "lump" at the left breast biopsy site with a comment from the radiologist of a small hematoma with surrounding ecchymosis present on July 22, 2017, with instructions for warm compresses and pressure. The patient ultimately saw Dr. Caroline Benítez and underwent a bilateral lumpectomy and reduction mammoplasty, which was performed on August 11, 2017, at [facility] with a finding in the right breast of an invasive ductal carcinoma, moderately differentiated, measuring 2 cm in greatest diameter, nuclear, Hopewell score 7/9, with evidence of DCIS and LCIS present in addition to fibrocystic changes with margins negative for carcinoma, with 0/3 sentinel lymph nodes involved with carcinoma; the left breast excisional biopsy showed evidence of ductal carcinoma in situ with microinvasion, with DCIS being of high nuclear grade, solid and comedo type with central necrosis, evidence of lobular carcinoma in situ, with margins of resection negative for DCIS and microinvasion. The patient returned to the operating room on October 13, 2017 for a left sentinel lymph node biopsy with a finding of 0/3 left sentinel lymph nodes involved with carcinoma. She subsequently had Oncotype DX testing sent off on her right breast carcinoma with a finding of a recurrence score of 7, correlating to an 8% risk of distant recurrence within 10 years should she take tamoxifen alone. The patient saw a medical oncologist, Dr. Maldonado, at [facility] who recommended adjuvant chemotherapy with CMF. She also had BRCA testing sent off, specifically a BRCA 1/2 [employer] 3-site mutation panel, which returned negative of note.\par \par She saw me in initial consultation in 11/17 and I recommended that she proceed with further genetic predisposition testing which returned negative for other known genetic predisposition mutations. Started Tamoxifen on October 24th 2017. She completed RT at [place] () on 2/13/18. \par \par Disease: breast cancer  \par Pathology: right breast IDC; left breast DCIS \par TNM stage: T1, N0, M0 \par AJCC Stage: 1 \par \par In the past she had c/o increasing arthralgias and joint swelling. She followed up with her PCP and was found to have Lyme IgG Ab P23 s/p doxycycline 100mg BID x 21 days in the beginning of June 2019.  She was prescribed another 21 days but refused to take it due to side effects.  Since then, she reported improvement in her forgetfulness, lightheadedness, fatigue, hair thinning.  Fevers and night sweats resolved. Thyroid workup neg, mildly elevated TSH.  \par \par She was last seen in 7/19. In the interim she c/o gradually worsening arthralgias and body aches especially since 11/19. She saw a new PCP - Dr Grimm who then referred her for an ID consult, Dr Kaba, and had no serologic evidence of Lyme Dz, or other active infections. She then had a rheum consult with Dr. Charles, who assessed:\par \par "44 year old female presents with polyarticular joint pains and myalgias of unclear etiology. Some of her symptoms, including the distribution of joints (MCP's and PIP's), joint swelling, elevated CRP, and family history, are suggestive of rheumatoid arthritis, though other symptoms, including pain outside of the joints and lack of prolonged AM stiffness, are atypical. I have therefore ordered some more bloodwork and x-rays as further workup. She will follow up with me again in 2 weeks to review her results. In the meantime, I have also started her on a trial of low-dose prednisone."\par \par Pt reports prednisone did not lead to any improvement so she d/c'd it.  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 DDx also includes AE's from tamoxifen, though less likely.\par  - Rx naproxen 500mg BID.\par  - x-rays of L-spine, S-I joints.\par \par 44 year old female with polyarticular joint pains and low back pain and found to have (+)HLA-B27. 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 DDx also includes AE's from tamoxifen, though less likely.\par  - Rx naproxen 500mg BID.\par  - x-rays of L-spine, S-I joints.\par \par She ultimately was seen by Dr Charles (rheum). Found  CRP elevated, diffuse pain, undifferentiated spondyloarthropathy, inflammatory arthritis, muscle cramps, polyarticular joint pains and low back pain and found to have (+)HLA-B27. Presentation suggestive of undifferentiated spondyloarthropathy vs seronegative RA. MRI L-spine revealed degenerative changes / no evidence of spondylitis. DDx also included AE's from tamoxifen, though less likely. Symptoms had been improving on sulfasalazine, but then lost effect. Pt was recommended to start methotrexate. \par \par  \par  [de-identified] : Seen today for a f/u visit.. \par \par She cont on tamoxifen and denies an treatment related side effects. \par \par She c/o persistent intermittent, arthralgias, low back pain, muscles aches and generalized fatigue although there has been some interval improvement on Rosovo again\par \par She has chronic HAs that increased somewhat with the start of Rosovo but then when the dose was decreased these went back toward baseline again.  \par \par Had c/o LLQ pain, worse with meals. Seen and evaluated by Dr Miller (revd her note in [facility]) and her PCP - saw a GI MD and had w/u showing malrotation of small bowel on imaging, normal colonoscopy.  . \par \par She describes infreq positional vertigo sxs wesly on standing up from being bent over but also at other times. No change over time.  \par \par 6/21 mammo/sono neg\par 1/22 MRI neg / benign findings.

## 2022-05-03 NOTE — PHYSICAL EXAM
[Fully active, able to carry on all pre-disease performance without restriction] : Status 0 - Fully active, able to carry on all pre-disease performance without restriction [Normal] : affect appropriate [de-identified] : R s/p LE wit hwell healed scar, w/o nipple retraction skin dimpling or palp masses. L s/p LE with well healed scar, no nipple retraction skin dimplin or palp masses. B/L ax neg

## 2022-05-05 ENCOUNTER — OUTPATIENT (OUTPATIENT)
Dept: OUTPATIENT SERVICES | Facility: HOSPITAL | Age: 47
LOS: 1 days | Discharge: HOME | End: 2022-05-05

## 2022-05-05 VITALS
OXYGEN SATURATION: 98 % | HEART RATE: 76 BPM | RESPIRATION RATE: 17 BRPM | TEMPERATURE: 98 F | SYSTOLIC BLOOD PRESSURE: 128 MMHG | WEIGHT: 167.55 LBS | HEIGHT: 62 IN | DIASTOLIC BLOOD PRESSURE: 82 MMHG

## 2022-05-05 DIAGNOSIS — Z01.818 ENCOUNTER FOR OTHER PREPROCEDURAL EXAMINATION: ICD-10-CM

## 2022-05-05 DIAGNOSIS — M75.101 UNSPECIFIED ROTATOR CUFF TEAR OR RUPTURE OF RIGHT SHOULDER, NOT SPECIFIED AS TRAUMATIC: ICD-10-CM

## 2022-05-05 DIAGNOSIS — Z98.890 OTHER SPECIFIED POSTPROCEDURAL STATES: Chronic | ICD-10-CM

## 2022-05-05 DIAGNOSIS — C50.919 MALIGNANT NEOPLASM OF UNSPECIFIED SITE OF UNSPECIFIED FEMALE BREAST: ICD-10-CM

## 2022-05-05 LAB
ANION GAP SERPL CALC-SCNC: 4 MMOL/L — LOW (ref 5–17)
BUN SERPL-MCNC: 14 MG/DL — SIGNIFICANT CHANGE UP (ref 7–23)
CALCIUM SERPL-MCNC: 9.2 MG/DL — SIGNIFICANT CHANGE UP (ref 8.5–10.1)
CHLORIDE SERPL-SCNC: 107 MMOL/L — SIGNIFICANT CHANGE UP (ref 96–108)
CO2 SERPL-SCNC: 29 MMOL/L — SIGNIFICANT CHANGE UP (ref 22–31)
CREAT SERPL-MCNC: 0.72 MG/DL — SIGNIFICANT CHANGE UP (ref 0.5–1.3)
EGFR: 104 ML/MIN/1.73M2 — SIGNIFICANT CHANGE UP
GLUCOSE SERPL-MCNC: 111 MG/DL — HIGH (ref 70–99)
HCG SERPL-ACNC: <1 MIU/ML — SIGNIFICANT CHANGE UP
HCT VFR BLD CALC: 34.9 % — SIGNIFICANT CHANGE UP (ref 34.5–45)
HGB BLD-MCNC: 11.3 G/DL — LOW (ref 11.5–15.5)
MCHC RBC-ENTMCNC: 32.4 G/DL — SIGNIFICANT CHANGE UP (ref 32–36)
MCHC RBC-ENTMCNC: 33.1 PG — SIGNIFICANT CHANGE UP (ref 27–34)
MCV RBC AUTO: 102.3 FL — HIGH (ref 80–100)
NRBC # BLD: 0 /100 WBCS — SIGNIFICANT CHANGE UP (ref 0–0)
PLATELET # BLD AUTO: 212 K/UL — SIGNIFICANT CHANGE UP (ref 150–400)
POTASSIUM SERPL-MCNC: 4.2 MMOL/L — SIGNIFICANT CHANGE UP (ref 3.5–5.3)
POTASSIUM SERPL-SCNC: 4.2 MMOL/L — SIGNIFICANT CHANGE UP (ref 3.5–5.3)
RBC # BLD: 3.41 M/UL — LOW (ref 3.8–5.2)
RBC # FLD: 12.7 % — SIGNIFICANT CHANGE UP (ref 10.3–14.5)
SODIUM SERPL-SCNC: 140 MMOL/L — SIGNIFICANT CHANGE UP (ref 135–145)
WBC # BLD: 4.31 K/UL — SIGNIFICANT CHANGE UP (ref 3.8–10.5)
WBC # FLD AUTO: 4.31 K/UL — SIGNIFICANT CHANGE UP (ref 3.8–10.5)

## 2022-05-05 NOTE — H&P PST ADULT - NS SC CAGE ALCOHOL CUT DOWN
no
Implemented All Universal Safety Interventions:  [place] to call system. Call bell, personal items and telephone within reach. Instruct patient to call for assistance. Room bathroom lighting operational. Non-slip footwear when patient is off stretcher. Physically safe environment: no spills, clutter or unnecessary equipment. Stretcher in lowest position, wheels locked, appropriate side rails in place.

## 2022-05-05 NOTE — H&P PST ADULT - ASSESSMENT
46 year old female with a past medical history of breast ca (2017)  reports falling at home while at home 2022, she c/o pain and limited to right shoulder.  She is scheduled for a right shoulder arthroscopy rotator cuff repair possible open biceps tenodesis on 2022.    CAPRINI SCORE [CLOT]    AGE RELATED RISK FACTORS                                                       MOBILITY RELATED FACTORS  [x ] Age 41-60 years                                            (1 Point)                  [ ] Bed rest                                                        (1 Point)  [ ] Age: 61-74 years                                           (2 Points)                 [ ] Plaster cast                                                   (2 Points)  [ ] Age= 75 years                                              (3 Points)                 [ ] Bed bound for more than 72 hours                 (2 Points)    DISEASE RELATED RISK FACTORS                                               GENDER SPECIFIC FACTORS  [ ] Edema in the lower extremities                       (1 Point)                  [ ] Pregnancy                                                     (1 Point)  [ ] Varicose veins                                               (1 Point)                  [ ] Post-partum < 6 weeks                                   (1 Point)             [x ] BMI > 25 Kg/m2                                            (1 Point)                  [ ] Hormonal therapy  or oral contraception          (1 Point)                 [ ] Sepsis (in the previous month)                        (1 Point)                  [ ] History of pregnancy complications                 (1 point)  [ ] Pneumonia or serious lung disease                                               [ ] Unexplained or recurrent                     (1 Point)           (in the previous month)                               (1 Point)  [ ] Abnormal pulmonary function test                     (1 Point)                 SURGERY RELATED RISK FACTORS  [ ] Acute myocardial infarction                              (1 Point)                 [ ]  Section                                             (1 Point)  [ ] Congestive heart failure (in the previous month)  (1 Point)               [ ] Minor surgery                                                  (1 Point)   [ ] Inflammatory bowel disease                             (1 Point)                 [ x] Arthroscopic surgery                                        (2 Points)  [ ] Central venous access                                      (2 Points)                [ ] General surgery lasting more than 45 minutes   (2 Points)       [ ] Stroke (in the previous month)                          (5 Points)               [ ] Elective arthroplasty                                         (5 Points)                                                                                                                                               HEMATOLOGY RELATED FACTORS                                                 TRAUMA RELATED RISK FACTORS  [ ] Prior episodes of VTE                                     (3 Points)                [ ] Fracture of the hip, pelvis, or leg                       (5 Points)  [ ] Positive family history for VTE                         (3 Points)                 [ ] Acute spinal cord injury (in the previous month)  (5 Points)  [ ] Prothrombin 04148 A                                     (3 Points)                 [ ] Paralysis  (less than 1 month)                             (5 Points)  [ ] Factor V Leiden                                             (3 Points)                  [ ] Multiple Trauma within 1 month                        (5 Points)  [ ] Lupus anticoagulants                                     (3 Points)                                                           [ ] Anticardiolipin antibodies                               (3 Points)                                                       [ ] High homocysteine in the blood                      (3 Points)                                             [ ] Other congenital or acquired thrombophilia      (3 Points)                                                [ ] Heparin induced thrombocytopenia                  (3 Points)                                          Total Score [    4      ]    Caprini Score 0 - 2:  Low Risk, No VTE Prophylaxis required for most patients, encourage ambulation  Caprini Score 3 - 6:  At Risk, pharmacologic VTE prophylaxis is indicated for most patients (in the absence of a contraindication)  Caprini Score Greater than or = 7:  High Risk, pharmacologic VTE prophylaxis is indicated for most patients (in the absence of a contraindication)

## 2022-05-05 NOTE — H&P PST ADULT - HISTORY OF PRESENT ILLNESS
46 year old female with a past medical history of breast ca (2017)  reports falling at home while at home 1/2022, she c/o pain and limited to right shoulder.  She is scheduled for a right shoulder arthroscopy rotator cuff repair possible open biceps tenodesis on 5/12/2022.    She denies fever, cough recent travels, ,and sick contacts.  +SOB  Echo " leaky mitral valve" stress test scheduled for 5/6  +COVID 4/12  treated with paxlovid

## 2022-05-05 NOTE — H&P PST ADULT - PROBLEM SELECTOR PLAN 1
right shoulder arthroscopy rotator cuff repair possible open biceps tenodesis   Hibiclens instructions provided  medical, cardiac.  and rheumatology clearance required  anesthesiologist to review PST labs, EKG, clearances and optimization for surgery

## 2022-05-05 NOTE — H&P PST ADULT - NSANTHOSAYNRD_GEN_A_CORE
No. MARYJANE screening performed.  STOP BANG Legend: 0-2 = LOW Risk; 3-4 = INTERMEDIATE Risk; 5-8 = HIGH Risk

## 2022-05-06 ENCOUNTER — APPOINTMENT (OUTPATIENT)
Dept: CARDIOLOGY | Facility: CLINIC | Age: 47
End: 2022-05-06
Payer: MEDICAID

## 2022-05-06 PROCEDURE — 93015 CV STRESS TEST SUPVJ I&R: CPT

## 2022-05-06 PROCEDURE — ZZZZZ: CPT

## 2022-05-09 ENCOUNTER — APPOINTMENT (OUTPATIENT)
Dept: PHYSICAL MEDICINE AND REHAB | Facility: CLINIC | Age: 47
End: 2022-05-09
Payer: MEDICAID

## 2022-05-09 VITALS — TEMPERATURE: 97.4 F | BODY MASS INDEX: 30.73 KG/M2 | WEIGHT: 167 LBS | HEIGHT: 62 IN

## 2022-05-09 PROCEDURE — 93702 BIS XTRACELL FLUID ANALYSIS: CPT

## 2022-05-09 PROCEDURE — 99214 OFFICE O/P EST MOD 30 MIN: CPT | Mod: 25

## 2022-05-09 NOTE — PHYSICAL EXAM
[FreeTextEntry1] : \par Gen: Patient is A&O x 3, NAD\par HEENT: EOMI, hearing grossly normal\par Resp: regular, non - labored\par CV: pulses regular\par Skin: no rashes, erythema\par Lymph: no clubbing, cyanosis, edema, or palpable lymphadenopathy\par Inspection: no instability or misalignment\par ROM: full throughout\par Palpation: TTP bilateral pectoral muscles \par Sensation: intact to light touch\par Reflexes: 1+ and symmetric throughout\par Strength: 5/5 throughout\par Special tests: -Hawkin's sign bilateral \par Gait: normal, non-antalgic\par \par Bioimpedance spectroscopy performed today with L-Dex 0.5 RUE (post-operative baseline -1.6)\par

## 2022-05-09 NOTE — REVIEW OF SYSTEMS
[Negative] : Psychiatric [FreeTextEntry9] : +breast tightness  [de-identified] : +Right arm heaviness

## 2022-05-09 NOTE — HISTORY OF PRESENT ILLNESS
[FreeTextEntry1] : Ms. Flowers is a 46 year old female with history of bilateral breast cancer.  8/11/17 S/p bilat BCS with R SLN, bilat breast reduction, oncoplastic surgery and biozorb insertion on for RIGHT UIQ [id number] Infiltrating ductal Cancer, margins 1.2 cm, Gr 2. LN 1/4, ER/NH positive and [id number] neg.  S/p L SLNBx due to microinvasion on final path 10/13/17.  Started tamoxifen 10/24/17.  Finished EBRT 2/18/18 Dr. Nogueira rad onc. \par \par Since her last visit she reports she had difficulty with scheduling her therapy and was unable to perform much breast rehabilitation program.  She has been seeing orthopedics planning for right shoulder surgery this week.  Reports the gabapentin is helping she is taking it 3 times a day and denies any side effects.  She notes that she has been experiencing more heaviness in her right upper extremity.  Denies any redness or erythema.  Denies any overt swelling today.\par \par \par \par

## 2022-05-09 NOTE — ASSESSMENT
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  \par \par #Neuropathic Pain:\par -Continue gabapentin 100mg TID, discussed increasing, but she would like to defer, Rx sent.  \par \par #Bilateral Shoulder Pain:\par -Orthopedics notes reviewed, pending right shoulder surgery \par \par #At risk for lymphedema:\par -No no clinical signs of lymphedema on exam.  Patient did report subjective symptoms of heaviness in RUE.  No pitting or erythema to suggest DVT.  \par -Bioimpedance spectroscopy performed with L-dex 0.5, no significant increase.  Continue to monitor.  \par \par Follow up in 3 months.\par \par The patient had a follow-up SOZO measurement which I reviewed today. The score is 0.5. Bioimpedance spectroscopy helps identify the onset of lymphedema in an arm or leg before patients experience noticeable swelling. Research has shown that the early detection of lymphedema using L-Dex combined with treatment can reduce progression to chronic lymphedema by 95% in breast cancer patients. Whenever possible, patients are tested for baseline L-Dex score before cancer treatment begins and then are reassessed during regular follow-up visits using the SOZO device. Otherwise, this can be started postoperatively and continued during regular follow-up visits. If the patient’s L-Dex score increases above normal levels, that is a sign that lymphedema is developing, and a referral is made to physical therapy for further evaluation and/or early compression treatment. Lymphedema assessment with the SOZO L-Dex score is recommended to be done every 3 months for the first 3 years and then every 6 months for years 4 and 5, followed by annually afterwards.\par

## 2022-05-10 ENCOUNTER — APPOINTMENT (OUTPATIENT)
Dept: FAMILY MEDICINE | Facility: CLINIC | Age: 47
End: 2022-05-10
Payer: MEDICAID

## 2022-05-10 VITALS
OXYGEN SATURATION: 97 % | WEIGHT: 167 LBS | DIASTOLIC BLOOD PRESSURE: 80 MMHG | TEMPERATURE: 97.8 F | SYSTOLIC BLOOD PRESSURE: 127 MMHG | HEART RATE: 92 BPM | HEIGHT: 62 IN | BODY MASS INDEX: 30.73 KG/M2

## 2022-05-10 DIAGNOSIS — M06.09 RHEUMATOID ARTHRITIS W/OUT RHEUMATOID FACTOR, MULTIPLE SITES: ICD-10-CM

## 2022-05-10 PROCEDURE — 99214 OFFICE O/P EST MOD 30 MIN: CPT

## 2022-05-11 ENCOUNTER — TRANSCRIPTION ENCOUNTER (OUTPATIENT)
Age: 47
End: 2022-05-11

## 2022-05-11 ENCOUNTER — OUTPATIENT (OUTPATIENT)
Dept: OUTPATIENT SERVICES | Facility: HOSPITAL | Age: 47
LOS: 1 days | Discharge: HOME | End: 2022-05-11

## 2022-05-11 DIAGNOSIS — Z98.890 OTHER SPECIFIED POSTPROCEDURAL STATES: Chronic | ICD-10-CM

## 2022-05-11 DIAGNOSIS — U07.1 COVID-19: ICD-10-CM

## 2022-05-11 LAB
FLUAV AG NPH QL: SIGNIFICANT CHANGE UP
FLUBV AG NPH QL: SIGNIFICANT CHANGE UP
SARS-COV-2 RNA SPEC QL NAA+PROBE: SIGNIFICANT CHANGE UP

## 2022-05-12 ENCOUNTER — OUTPATIENT (OUTPATIENT)
Dept: OUTPATIENT SERVICES | Facility: HOSPITAL | Age: 47
LOS: 1 days | Discharge: HOME | End: 2022-05-12
Payer: MEDICAID

## 2022-05-12 ENCOUNTER — TRANSCRIPTION ENCOUNTER (OUTPATIENT)
Age: 47
End: 2022-05-12

## 2022-05-12 ENCOUNTER — APPOINTMENT (OUTPATIENT)
Dept: ORTHOPEDIC SURGERY | Facility: HOSPITAL | Age: 47
End: 2022-05-12

## 2022-05-12 VITALS
TEMPERATURE: 98 F | WEIGHT: 169.98 LBS | HEIGHT: 62 IN | OXYGEN SATURATION: 99 % | SYSTOLIC BLOOD PRESSURE: 129 MMHG | RESPIRATION RATE: 17 BRPM | HEART RATE: 100 BPM | DIASTOLIC BLOOD PRESSURE: 82 MMHG

## 2022-05-12 VITALS — DIASTOLIC BLOOD PRESSURE: 95 MMHG | SYSTOLIC BLOOD PRESSURE: 148 MMHG

## 2022-05-12 DIAGNOSIS — Z98.890 OTHER SPECIFIED POSTPROCEDURAL STATES: Chronic | ICD-10-CM

## 2022-05-12 LAB — HCG UR QL: NEGATIVE — SIGNIFICANT CHANGE UP

## 2022-05-12 PROCEDURE — 29828 SHO ARTHRS SRG BICP TENODSIS: CPT | Mod: RT

## 2022-05-12 PROCEDURE — 29827 SHO ARTHRS SRG RT8TR CUF RPR: CPT | Mod: RT

## 2022-05-12 DEVICE — IMPLANTABLE DEVICE: Type: IMPLANTABLE DEVICE | Site: RIGHT | Status: FUNCTIONAL

## 2022-05-12 DEVICE — ANCHOR BIO-COMP SWIVLCK C 4.75X19.1MM: Type: IMPLANTABLE DEVICE | Site: RIGHT | Status: FUNCTIONAL

## 2022-05-12 RX ORDER — GABAPENTIN 400 MG/1
2 CAPSULE ORAL
Qty: 30 | Refills: 0
Start: 2022-05-12 | End: 2022-05-16

## 2022-05-12 RX ORDER — ONDANSETRON 8 MG/1
1 TABLET, FILM COATED ORAL
Qty: 10 | Refills: 0
Start: 2022-05-12 | End: 2022-05-21

## 2022-05-12 RX ORDER — MELOXICAM 15 MG/1
1 TABLET ORAL
Qty: 15 | Refills: 0
Start: 2022-05-12 | End: 2022-05-26

## 2022-05-12 RX ORDER — ONDANSETRON 8 MG/1
4 TABLET, FILM COATED ORAL ONCE
Refills: 0 | Status: DISCONTINUED | OUTPATIENT
Start: 2022-05-12 | End: 2022-05-12

## 2022-05-12 RX ORDER — CHOLECALCIFEROL (VITAMIN D3) 125 MCG
1 CAPSULE ORAL
Qty: 0 | Refills: 0 | DISCHARGE

## 2022-05-12 RX ORDER — SODIUM CHLORIDE 9 MG/ML
1000 INJECTION, SOLUTION INTRAVENOUS
Refills: 0 | Status: DISCONTINUED | OUTPATIENT
Start: 2022-05-12 | End: 2022-05-12

## 2022-05-12 RX ORDER — METHOTREXATE 2.5 MG/1
0 TABLET ORAL
Qty: 0 | Refills: 0 | DISCHARGE

## 2022-05-12 RX ORDER — DICLOFENAC SODIUM 75 MG/1
1 TABLET, DELAYED RELEASE ORAL
Qty: 0 | Refills: 0 | DISCHARGE

## 2022-05-12 RX ORDER — SODIUM CHLORIDE 9 MG/ML
3 INJECTION INTRAMUSCULAR; INTRAVENOUS; SUBCUTANEOUS EVERY 8 HOURS
Refills: 0 | Status: DISCONTINUED | OUTPATIENT
Start: 2022-05-12 | End: 2022-05-12

## 2022-05-12 RX ORDER — HYDROMORPHONE HYDROCHLORIDE 2 MG/ML
0.5 INJECTION INTRAMUSCULAR; INTRAVENOUS; SUBCUTANEOUS
Refills: 0 | Status: DISCONTINUED | OUTPATIENT
Start: 2022-05-12 | End: 2022-05-12

## 2022-05-12 RX ORDER — GABAPENTIN 400 MG/1
1 CAPSULE ORAL
Qty: 0 | Refills: 0 | DISCHARGE

## 2022-05-12 RX ORDER — OXYCODONE HYDROCHLORIDE 5 MG/1
1 TABLET ORAL
Qty: 6 | Refills: 0
Start: 2022-05-12 | End: 2022-05-21

## 2022-05-12 RX ORDER — METOCLOPRAMIDE HCL 10 MG
1 TABLET ORAL
Qty: 0 | Refills: 0 | DISCHARGE

## 2022-05-12 RX ORDER — HYDROMORPHONE HYDROCHLORIDE 2 MG/ML
1 INJECTION INTRAMUSCULAR; INTRAVENOUS; SUBCUTANEOUS
Refills: 0 | Status: DISCONTINUED | OUTPATIENT
Start: 2022-05-12 | End: 2022-05-12

## 2022-05-12 RX ORDER — TAMOXIFEN CITRATE 20 MG/1
1 TABLET, FILM COATED ORAL
Qty: 0 | Refills: 0 | DISCHARGE

## 2022-05-12 RX ORDER — ACETAMINOPHEN 500 MG
2 TABLET ORAL
Qty: 60 | Refills: 0
Start: 2022-05-12 | End: 2022-05-21

## 2022-05-12 RX ORDER — FOLIC ACID 0.8 MG
1 TABLET ORAL
Qty: 0 | Refills: 0 | DISCHARGE

## 2022-05-12 RX ORDER — TRAMADOL HYDROCHLORIDE 50 MG/1
1 TABLET ORAL
Qty: 15 | Refills: 0
Start: 2022-05-12 | End: 2022-05-16

## 2022-05-12 RX ORDER — PREGABALIN 225 MG/1
0 CAPSULE ORAL
Qty: 0 | Refills: 0 | DISCHARGE

## 2022-05-12 RX ADMIN — SODIUM CHLORIDE 110 MILLILITER(S): 9 INJECTION, SOLUTION INTRAVENOUS at 11:45

## 2022-05-12 NOTE — ASU DISCHARGE PLAN (ADULT/PEDIATRIC) - CARE PROVIDER_API CALL
Ti Coombs)  Orthopaedic Surgery  [address], [address]  [place] [address]  Phone: [phone]  Fax: [phone]  Follow Up Time:

## 2022-05-12 NOTE — ASU DISCHARGE PLAN (ADULT/PEDIATRIC) - NS MD DC FALL RISK RISK
For information on Fall & Injury Prevention, visit: [email or web address] OR  [email or web address] OR  https://www.cdc.gov/steadi/patient.html

## 2022-05-12 NOTE — BRIEF OPERATIVE NOTE - NSICDXBRIEFPROCEDURE_GEN_ALL_CORE_FT
PROCEDURES:  Arthroscopic repair of right rotator cuff 12-May-2022 10:47:29 arthroscopic biceps tenodesis Donaldo Ramirez

## 2022-05-12 NOTE — ASU DISCHARGE PLAN (ADULT/PEDIATRIC) - ASU DC SPECIAL INSTRUCTIONSFT
Shoulder Arthroscopy Instructions (and/or Arthroscopic Rotator Cuff Repair)    1) Your Shoulder will swell over the next 48hours and you can expect pain to get a bit worse. Your hand may swell also. Ice your shoulder plenty, continuously if possible. Fill up a plastic bag with ice, then wrap it in a towel or pillow case.     2) ACTIVITY: Elevate your hand and wiggle your wingers often (10x per hour). NO LIFTING ANYTHING with the arm. Be up and walking as much as you can tolereate. The more you move the better you will do.     3) BANDAGE: Expect some mild bloody drainage. It is normal. It may soak through the gauze and ACE bandage. This is mostly leftover Saline fluid coming out from surgery.     4) SHOWER: Remove bandage in 48hrs and place a Waterproof Band Aide over each of the 3 incisions. You can shower in 48 hours. No bath. Pat your incisions dry. No creams, no lotions.    5) Only reason to worry would be if pain got so severe that you cannot feel or wiggle your fingers. In this case you need to call or come to the ER. But as long as you can feel and wiggle your fingers, you are fine.    6) Call the office to schedule a follow up appointment to see Dr. Coombs in 10-14 days. Your Sutures will be removed at that point.    7) A pain Rx is in the chart; fill it on your way home. OR was sent electronically to your pharmacy, pick it up on the way home.    8) Follow instructions for medications per the opioid sparing protocol

## 2022-05-12 NOTE — ASU PREOP CHECKLIST - NS ASU TO WHOM HOLDING TO OR
Brief Postoperative Note  ______________________________________________________________    Patient: Emmanuel Medina  YOB: 1986  MRN: [id number]  Date of Procedure: 8/17/2018    Pre-Op Diagnosis: Interstitial cystitis    Post-Op Diagnosis: Same       Procedure(s):  CYSTOSCOPY, HYDRODISTENTION, URETHRAL DILATATION WITH INSTILLATION OF DIMETHYL SULFOXIDE  CPT CODE - 94832    Anesthesia: General    Surgeon(s):  Hina Rodriguez MD    Staff:  Scrub Person First: Kyree Faust     Estimated Blood Loss: * No values recorded between 8/17/2018  7:27 AM and 8/17/2018  8:04 AM * None    Complications: None    Specimens:   * No specimens in log *    Implants:  * No implants in log *      Drains:   Urethral Catheter Latex 16 fr (Active)       Findings: bladder capacity 1000mL    Plan- f/u with [facility] in 1-2 months    Hina Rodriguez MD  Date: 8/17/2018  Time: 8:04 AM [email or web address]/[email or web address]

## 2022-05-12 NOTE — ASU DISCHARGE PLAN (ADULT/PEDIATRIC) - PROCEDURE
left shoulder arthroscopy with rotator cuff repair right shoulder arthroscopy with rotator cuff repair

## 2022-05-12 NOTE — ASU PATIENT PROFILE, ADULT - FALL HARM RISK - UNIVERSAL INTERVENTIONS
Bed in lowest position, wheels locked, appropriate side rails in place/Call bell, personal items and telephone in reach/Instruct patient to call for assistance before getting out of bed or chair/Non-slip footwear when patient is out of bed/[place] to call system/Physically safe environment - no spills, clutter or unnecessary equipment/Purposeful Proactive Rounding/Room/bathroom lighting operational, light cord in reach

## 2022-05-18 ENCOUNTER — NON-APPOINTMENT (OUTPATIENT)
Age: 47
End: 2022-05-18
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
## 2022-05-18 DIAGNOSIS — S46.011A STRAIN OF MUSCLE(S) AND TENDON(S) OF THE ROTATOR CUFF OF RIGHT SHOULDER, INITIAL ENCOUNTER: ICD-10-CM

## 2022-05-18 DIAGNOSIS — Z79.810 LONG TERM (CURRENT) USE OF SELECTIVE ESTROGEN RECEPTOR MODULATORS (SERMS): ICD-10-CM

## 2022-05-18 DIAGNOSIS — S43.431A SUPERIOR GLENOID LABRUM LESION OF RIGHT SHOULDER, INITIAL ENCOUNTER: ICD-10-CM

## 2022-05-18 DIAGNOSIS — Z85.3 PERSONAL HISTORY OF MALIGNANT NEOPLASM OF BREAST: ICD-10-CM

## 2022-05-18 DIAGNOSIS — M75.21 BICIPITAL TENDINITIS, RIGHT SHOULDER: ICD-10-CM
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
## 2022-05-24 ENCOUNTER — APPOINTMENT (OUTPATIENT)
Dept: ORTHOPEDIC SURGERY | Facility: CLINIC | Age: 47
End: 2022-05-24
Payer: MEDICAID

## 2022-05-24 PROCEDURE — 99024 POSTOP FOLLOW-UP VISIT: CPT

## 2022-05-24 NOTE — HISTORY OF PRESENT ILLNESS
[de-identified] : R shoulder [de-identified] : 47yo F s/p Right shoulder arthroscopy, rotator cuff repair, arthroscopic suprapectoral biceps tenodesis, 5/12/22.  overall doing well, blister healing, no drainage, using sling, started PT.  Denies numbness/tingling. [de-identified] : right shoulder exam.\par inc healed well. no erythema\par no pain gentle passive rom\par able to flex/ext all fingers\par silt r/u/m/ax\par bcr\par  [de-identified] : 45yo F s/p Right shoulder arthroscopy, rotator cuff repair, arthroscopic suprapectoral biceps tenodesis, 5/12/22. [de-identified] : We reviewed postoperative protocol and restrictions.  We reviewed intraoperative photographs.  Continue sling immobilization.  Encouraged to start physical therapy.  Follow-up 1 month

## 2022-06-07 ENCOUNTER — APPOINTMENT (OUTPATIENT)
Dept: INTERNAL MEDICINE | Facility: CLINIC | Age: 47
End: 2022-06-07

## 2022-06-21 ENCOUNTER — APPOINTMENT (OUTPATIENT)
Dept: RHEUMATOLOGY | Facility: CLINIC | Age: 47
End: 2022-06-21
Payer: MEDICAID

## 2022-06-21 VITALS
HEIGHT: 62 IN | RESPIRATION RATE: 17 BRPM | SYSTOLIC BLOOD PRESSURE: 140 MMHG | BODY MASS INDEX: 31.83 KG/M2 | DIASTOLIC BLOOD PRESSURE: 86 MMHG | OXYGEN SATURATION: 99 % | TEMPERATURE: 98.3 F | HEART RATE: 90 BPM | WEIGHT: 173 LBS

## 2022-06-21 PROCEDURE — 99214 OFFICE O/P EST MOD 30 MIN: CPT

## 2022-06-21 RX ORDER — LEFLUNOMIDE 10 MG/1
10 TABLET, FILM COATED ORAL DAILY
Qty: 30 | Refills: 2 | Status: COMPLETED | COMMUNITY
Start: 2022-03-22 | End: 2022-06-21

## 2022-06-21 RX ORDER — NIRMATRELVIR AND RITONAVIR 300-100 MG
20 X 150 MG & KIT ORAL
Qty: 30 | Refills: 0 | Status: COMPLETED | COMMUNITY
Start: 2022-04-08

## 2022-06-21 NOTE — HISTORY OF PRESENT ILLNESS
[___ Month(s) Ago] : [unfilled] month(s) ago [Fatigue] : fatigue [Arthralgias] : arthralgias [Myalgias] : myalgias [FreeTextEntry1] : Pt underwent right shoulder surgery on 5/12.  She reports that her joint pains had been much improved on leflunomide 20mg daily, but due to an issue with her prescription, she ran out and had to decrease it back to 10mg daily, and over the past 3 days "all of the joints" have been very painful again, worst in her hands and knees.  (+)swelling in her hands.  (+)stiffness x 35 minutes.   [Anorexia] : no anorexia [Weight Loss] : no weight loss [Malaise] : no malaise [Fever] : no fever [Chills] : no chills [Malar Facial Rash] : no malar facial rash [Skin Lesions] : no lesions [Skin Nodules] : no skin nodules [Oral Ulcers] : no oral ulcers [Cough] : no cough [Dry Mouth] : no dry mouth [Dysphonia] : no dysphonia [Dysphagia] : no dysphagia [Shortness of Breath] : no shortness of breath [Chest Pain] : no chest pain [Joint Swelling] : no joint swelling [Joint Warmth] : no joint warmth [Joint Deformity] : no joint deformity [Decreased ROM] : no decreased range of motion [Morning Stiffness] : no morning stiffness [Falls] : no falls [Difficulty Standing] : no difficulty standing [Difficulty Walking] : no difficulty walking [Dyspnea] : no dyspnea [Muscle Weakness] : no muscle weakness [Muscle Spasms] : no muscle spasms [Muscle Cramping] : no muscle cramping [Visual Changes] : no visual changes [Eye Pain] : no eye pain [Eye Redness] : no eye redness [Dry Eyes] : no dry eyes

## 2022-06-21 NOTE — ASSESSMENT
[FreeTextEntry1] : 46 year old female with polyarticular joint pains and low back pain and found to have (+)HLA-B27 as well as diffuse enthesopathy - suggestive of undifferentiated spondyloarthropathy.  MRI L-spine reveals degenerative changes / no evidence of spondylitis.  MRI pelvis w/ no evidence of sacroiliitis.  Symptoms had been improving on sulfasalazine, but it then lost effect.  Had mild improvement on MTX, but did not tolerate it (abd pain/diarrhea). Had improved overall on MTX SC (Rasuvo) + sulfasalazine, but pt experiencing abd pain of unclear etiology - possibly due to SSZ.  Was then much improved on Rasuvo + leflunomide 20mg daily, but currently worse as she had to decrease leflunomide to 10mg daily (she ran out due to a prescription issue).  Have been unable to use biologics until now given recent hx of breast CA, though pt reports that in 2 months, it will have been 5 years.  Pt also recently found to have a full thickness tear of the supraspinatus in the right shoulder - now s/p surgery.\par   - Cont Rasuvo 20mg SC weekly, folic acid 1mg daily.\par   - Increase leflunomide back to 20mg daily.\par   - If no significant improvement by next visit, will discuss possibly starting a biologic.\par   - Hepatitis panel negative 11/21.  Quantiferon negative 4/2021.\par   - Flu vaccine (10/21), Pneumovax (10/21), Prevnar (8/20) UTD.  Pt has received the COVID19 vaccine + booster.  Recommended that she receive the 2nd booster as per current guidelines. Advised her to hold MTX and leflunomide for 1 week after receiving it, as per ACR recommendations. \par   - Cont diclofenac 75mg BID prn for now.\par   - Reiterated importance of exercise.\par   - warm compresses\par   - OTC topical analgesics.\par   - Ortho f/u.\par \par

## 2022-06-21 NOTE — DATA REVIEWED
[FreeTextEntry1] : CBC unremarkable\par Ca2+ 8.6; rest of CMP unremarkable\par ESR 15\par CRP 11 mg/L

## 2022-06-21 NOTE — PHYSICAL EXAM
[General Appearance - Alert] : alert [General Appearance - In No Acute Distress] : in no acute distress [Sclera] : the sclera and conjunctiva were normal [Outer Ear] : the ears and nose were normal in appearance [Oropharynx] : the oropharynx was normal [Neck Appearance] : the appearance of the neck was normal [Neck Cervical Mass (___cm)] : no neck mass was observed [Jugular Venous Distention Increased] : there was no jugular-venous distention [Thyroid Diffuse Enlargement] : the thyroid was not enlarged [Thyroid Nodule] : there were no palpable thyroid nodules [Auscultation Breath Sounds / Voice Sounds] : lungs were clear to auscultation bilaterally [Heart Rate And Rhythm] : heart rate was normal and rhythm regular [Heart Sounds] : normal S1 and S2 [Heart Sounds Gallop] : no gallops [Murmurs] : no murmurs [Heart Sounds Pericardial Friction Rub] : no pericardial rub [Edema] : there was no peripheral edema [Bowel Sounds] : normal bowel sounds [Abdomen Soft] : soft [Abdomen Tenderness] : non-tender [Abdomen Mass (___ Cm)] : no abdominal mass palpated [Cervical Lymph Nodes Enlarged Posterior Bilaterally] : posterior cervical [Cervical Lymph Nodes Enlarged Anterior Bilaterally] : anterior cervical [Supraclavicular Lymph Nodes Enlarged Bilaterally] : supraclavicular [Skin Color & Pigmentation] : normal skin color and pigmentation [Skin Turgor] : normal skin turgor [] : no rash [No Focal Deficits] : no focal deficits [Oriented To Time, Place, And Person] : oriented to person, place, and time [Impaired Insight] : insight and judgment were intact [Affect] : the affect was normal [FreeTextEntry1] : (+)mild swelling in her B/L 2nd-5th MCP's; (+)tenderness in B/L 2nd-5th PIP's, B/L 2nd-5th MCP's;  right shoulder w/ in sling s/p recent surgery

## 2022-06-28 ENCOUNTER — APPOINTMENT (OUTPATIENT)
Dept: ORTHOPEDIC SURGERY | Facility: CLINIC | Age: 47
End: 2022-06-28
Payer: MEDICAID

## 2022-06-28 PROCEDURE — 99024 POSTOP FOLLOW-UP VISIT: CPT

## 2022-06-28 NOTE — HISTORY OF PRESENT ILLNESS
[de-identified] : R shoulder [de-identified] : 45yo F s/p Right shoulder arthroscopy, rotator cuff repair, arthroscopic suprapectoral biceps tenodesis, 5/12/22.  She is overall doing well with improving pain.  She stopped using her sling last week.  She is happy with her progress [de-identified] : right shoulder exam.\par inc healed well. no erythema\par no pain gentle passive rom\par Forward elevation 90 degrees.  Able to maintain arm in active abduction\par able to flex/ext all fingers\par silt r/u/m/ax\par bcr\par  [de-identified] : 47yo F s/p Right shoulder arthroscopy, rotator cuff repair, arthroscopic suprapectoral biceps tenodesis, 5/12/22. [de-identified] : We reviewed postoperative protocol and restrictions.  She is out of her sling she will continue with physical therapy.  She will follow-up in 6 weeks

## 2022-07-01 ENCOUNTER — APPOINTMENT (OUTPATIENT)
Dept: BREAST CENTER | Facility: CLINIC | Age: 47
End: 2022-07-01

## 2022-07-01 VITALS
HEIGHT: 62 IN | HEART RATE: 90 BPM | BODY MASS INDEX: 31.83 KG/M2 | WEIGHT: 173 LBS | TEMPERATURE: 97.3 F | DIASTOLIC BLOOD PRESSURE: 79 MMHG | SYSTOLIC BLOOD PRESSURE: 127 MMHG

## 2022-07-01 DIAGNOSIS — Z80.3 FAMILY HISTORY OF MALIGNANT NEOPLASM OF BREAST: ICD-10-CM

## 2022-07-01 DIAGNOSIS — N64.4 MASTODYNIA: ICD-10-CM

## 2022-07-01 PROCEDURE — 99213 OFFICE O/P EST LOW 20 MIN: CPT

## 2022-07-01 NOTE — PHYSICAL EXAM
[Normocephalic] : normocephalic [Atraumatic] : atraumatic [Supple] : supple [No Supraclavicular Adenopathy] : no supraclavicular adenopathy [No Thyromegaly] : no thyromegaly [Examined in the supine and seated position] : examined in the supine and seated position [No dominant masses] : no dominant masses in right breast  [No dominant masses] : no dominant masses left breast [No Nipple Retraction] : no left nipple retraction [No Nipple Discharge] : no left nipple discharge [No Axillary Lymphadenopathy] : no left axillary lymphadenopathy [No Edema] : no edema [No Swelling] : no swelling [Full ROM] : full range of motion [No Rashes] : no rashes [No Ulceration] : no ulceration [de-identified] : R limited ROM more than L - recent shoulder surgery. No current swelling noted, but pt was seeing Lymphedema therapist - stopped for R shoulder surgery, but pt will return PRN

## 2022-07-01 NOTE — DATA REVIEWED
[FreeTextEntry1] : 6/27/22: ZP sMMG: Dense breast: biozorbs in place, BR2. \par 6/27/22 ZP Fofana/S: Right 1:00 4 cm FTN, subcm cluster of cysts at 2:00, Left 1:00 9cm FTN, postop. BR2.

## 2022-07-01 NOTE — CONSULT LETTER
[Dear  ___] : Dear  [unfilled], [Courtesy Letter:] : I had the pleasure of seeing your patient, [unfilled], in my office today. [Please see my note below.] : Please see my note below. [Consult Closing:] : Thank you very much for allowing me to participate in the care of this patient.  If you have any questions, please do not hesitate to contact me. [Sincerely,] : Sincerely, [FreeTextEntry3] : Annalisa Cooley NP (Dr. Caroline Benítez)

## 2022-07-01 NOTE — ASSESSMENT
[FreeTextEntry1] : 45 y/o BRCA neg female here for f.u for CBE and review recent imaging, hx of bilat BCT, pt is 4.5 years out. Pt denies any breast lesions, discharge or masses.\par 8/11/17 S/p bilat BCS with R SLN, bilat breast reduction, oncoplastic surgery and biozorb insertion on for RIGHT UIQ [id number] Infiltrating ductal Cancer, margins 1.2 cm, Gr 2. LN 1/4, ER/AZ positive and Oqj7ans neg. Oncotype was 7. LEFT was pT1mic, N0, Margins neg at 5 mm. ER+/AZ neg and Her2 neg. s/p L SLNBx due to microinvasion on final path 10/13/17. \par \par Reporting bilateral tenderness/pain to axilla and R upper inner breasts, mostly when it rains. She feels L>R nipple pain at times, and recently had some swelling to her L axilla prior to LMP which has now dissipated. \par Is currently followed by Rheumatology () for arthralgia/ ? Lymes Disease. Occasional swelling to hands. \par \par Sees medical oncologist: Wade Alejo, Started tamoxifen 10/24/17. Still cycling monthly. Last seen 5/22\par  Has new GYN, Dr. Yumi Miller - due for f/u 2/7/22\par Finished EBRT 2/18/18 Dr. Nogueira rad onc. Discharged plastic surgery Dr. Gatica.\par Sees Rheum - Dr. Charles and was found to have elevated CRP - was on Suflasalazine, but was rec to start methotrexate. She has been on Methotrexate and is possibly going to talk to her Rheum about biologics.\par had R shoulder arthroscopy 5/22 w/ Dr. Ti Coombs\par Sees Dr. Dugan for PMR - had Sozo done last 5/22 - pt taking gabapentin (breast rehab on hold due to shoulder surgery - ? resume post surgery)\par \par 1/8/21, ZP, MRI: bilat scattered nonspecific enhancing foci, post surg changes, no susp findings, no significant adenopathy, continued bilat MRI f/u rec in 1 year, BR1\par 6/30/21, ZP, Bilat sMMG: het dense, no sup findings, biozorb in places and surgical clips in axilla, rec U/S for dense breasts and mmg 1 year, BR2\par 6/30/21, ZP, Bilat U/S: R post surg changes 1:00 w/o assoc mass, no susp findings, L post sutg changes several nodes ranging up to 1.2cm stable, subcentimeter cyst present RA region, no susp findings, no adenopathy bilat, BR2\par 1/21/22, ZP, MRI: Bilat scattered enhancing nonspecific foci, no susp findings, post surg changes noted, no sig lymphadenopathy, no MRI evidence of malignancy, BR2\par \par 6/27/22: ZP sMMG: Dense breast: biozorbs in place, BR2. \par 6/27/22 ZP Fofana/S: Right 1:00 4 cm FTN, subcm cluster of cysts at 2:00, Left 1:00 9cm FTN, postop. BR2. \par \par CBE: Bilat reduction scars. Excellent cosmesis. Bilat lumpy bumpy breasts. No adenopathy. CORIE\par ROM limited at shoulders - R>L as pt recently had R shoulder surgery. No lymphedema present at this time.\par \par Reviewed recent mammo and U/S, no suspicious findings. Again reviewed stress reducing activities such as gardening, meditation, aromatherapy. Also discussed tylenol PRN. SOZO not performed as pt f/u w/ Dr. Dugan (last SOZO done 5/22). F/u w/ Dr. Dugan, Dr. Charles, and Dr. Alejo as scheduled. Pt is going to find a new GYN and if she does not f/u w/ GYN she will f/u w/ me or NP 1/23 for CBE. Pt also given script for MRI due 1/23 - pt states that she has a lot going on - discussed that if pt is amenable w/ forgoing the MRI 1/23 that we will continue with annual mmg/us at this time for her dense breasts and imaging as needed in the interim. Pt will think about it and let me know. Script in the computer and will need authorization so pt will touch base with us. Pt does not need lymphedema referral at this time, encouraged pt to call if necessary. All questions answered and pt encouraged to call office with any questions or concerns PRN.

## 2022-07-01 NOTE — HISTORY OF PRESENT ILLNESS
[FreeTextEntry1] : 45 y/o BRCA neg female here for f.u for CBE and review recent imaging, hx of bilat BCT, pt is 4.5 years out. Pt denies any breast lesions, discharge or masses.\par 8/11/17 S/p bilat BCS with R SLN, bilat breast reduction, oncoplastic surgery and biozorb insertion on for RIGHT UIQ [id number] Infiltrating ductal Cancer, margins 1.2 cm, Gr 2. LN 1/4, ER/OK positive and [id number] neg. Oncotype was 7. LEFT was pT1mic, N0, Margins neg at 5 mm. ER+/OK neg and Her2 neg. s/p L SLNBx due to microinvasion on final path 10/13/17. \par \par Reporting bilateral tenderness/pain to axilla and R upper inner breasts, mostly when it rains. She feels L>R nipple pain at times, and recently had some swelling to her L axilla prior to LMP which has now dissipated. \par Is currently followed by Rheumatology () for arthralgia/ ? Lymes Disease. Occasional swelling to hands. \par \par Sees medical oncologist: Wade Alejo, Started tamoxifen 10/24/17. Still cycling monthly. Last seen 5/22\par  Has new GYN, Dr. Yumi Miller - due for f/u 2/7/22\par Finished EBRT 2/18/18 Dr. Nogueira rad onc. Discharged plastic surgery Dr. Gatica.\par Sees Rheum - Dr. Charles and was found to have elevated CRP - was on Suflasalazine, but was rec to start methotrexate. She has been on Methotrexate and is possibly going to talk to her Rheum about biologics.\par had R shoulder arthroscopy 5/22 w/ Dr. Ti Coombs\par Sees Dr. Dugan for PMR - had Sozo done last 5/22 - pt taking gabapentin (breast rehab on hold due to shoulder surgery - ? resume post surgery)\par \par 1/8/21, ZP, MRI: bilat scattered nonspecific enhancing foci, post surg changes, no susp findings, no significant adenopathy, continued bilat MRI f/u rec in 1 year, BR1\par 6/30/21, ZP, Bilat sMMG: het dense, no sup findings, biozorb in places and surgical clips in axilla, rec U/S for dense breasts and mmg 1 year, BR2\par 6/30/21, ZP, Bilat U/S: R post surg changes 1:00 w/o assoc mass, no susp findings, L post sutg changes several nodes ranging up to 1.2cm stable, subcentimeter cyst present RA region, no susp findings, no adenopathy bilat, BR2\par 1/21/22, ZP, MRI: Bilat scattered enhancing nonspecific foci, no susp findings, post surg changes noted, no sig lymphadenopathy, no MRI evidence of malignancy, BR2\par \par 6/27/22: ZP sMMG: Dense breast: biozorbs in place, BR2. \par 6/27/22 ZP Fofana/S: Right 1:00 4 cm FTN, subcm cluster of cysts at 2:00, Left 1:00 9cm FTN, postop. BR2. \par \par

## 2022-07-05 ENCOUNTER — RX RENEWAL (OUTPATIENT)
Age: 47
End: 2022-07-05

## 2022-08-09 ENCOUNTER — APPOINTMENT (OUTPATIENT)
Dept: ORTHOPEDIC SURGERY | Facility: CLINIC | Age: 47
End: 2022-08-09

## 2022-08-09 PROCEDURE — 99024 POSTOP FOLLOW-UP VISIT: CPT

## 2022-08-09 NOTE — HISTORY OF PRESENT ILLNESS
[de-identified] : R shoulder postop  [de-identified] : 47yo F now three months s/p Right shoulder arthroscopy, rotator cuff repair, arthroscopic suprapectoral biceps tenodesis, 5/12/22.  She has been with continued pain.  She feels she has less range of motion than she did previously, at times.  She continues to work with PT.  Denies numbness/tingling. She does have history significant for ankylosing spondylitis, is manged by Dr Jay Charles (Rheumatologist).   [de-identified] : right shoulder exam.\par inc healed well. no erythema\par no pain gentle passive rom\par Active forward elevation 100 degrees external rotation 40 degrees\par 5-/5 abd\par able to flex/ext all fingers\par silt r/u/m/ax\par bcr\par  [de-identified] : 47yo F s/p Right shoulder arthroscopy, rotator cuff repair, arthroscopic suprapectoral biceps tenodesis, 5/12/22. [de-identified] : We reviewed postoperative protocol and restrictions.  Continue physical therapy focus on range of motion and strengthening.  She will follow-up 3 months

## 2022-08-16 ENCOUNTER — APPOINTMENT (OUTPATIENT)
Dept: RHEUMATOLOGY | Facility: CLINIC | Age: 47
End: 2022-08-16

## 2022-08-16 ENCOUNTER — APPOINTMENT (OUTPATIENT)
Dept: PHYSICAL MEDICINE AND REHAB | Facility: CLINIC | Age: 47
End: 2022-08-16

## 2022-08-16 VITALS
BODY MASS INDEX: 31.28 KG/M2 | DIASTOLIC BLOOD PRESSURE: 75 MMHG | TEMPERATURE: 97.2 F | SYSTOLIC BLOOD PRESSURE: 120 MMHG | WEIGHT: 170 LBS | HEART RATE: 88 BPM | HEIGHT: 62 IN | OXYGEN SATURATION: 97 % | RESPIRATION RATE: 17 BRPM

## 2022-08-16 VITALS
BODY MASS INDEX: 31.83 KG/M2 | DIASTOLIC BLOOD PRESSURE: 80 MMHG | SYSTOLIC BLOOD PRESSURE: 132 MMHG | WEIGHT: 173 LBS | HEART RATE: 84 BPM | HEIGHT: 62 IN

## 2022-08-16 PROCEDURE — 93702 BIS XTRACELL FLUID ANALYSIS: CPT

## 2022-08-16 PROCEDURE — 99214 OFFICE O/P EST MOD 30 MIN: CPT | Mod: 25

## 2022-08-16 PROCEDURE — 99215 OFFICE O/P EST HI 40 MIN: CPT

## 2022-08-16 RX ORDER — TRAMADOL HYDROCHLORIDE 50 MG/1
50 TABLET, COATED ORAL
Qty: 15 | Refills: 0 | Status: DISCONTINUED | COMMUNITY
Start: 2022-05-12 | End: 2022-08-16

## 2022-08-16 RX ORDER — OXYCODONE HYDROCHLORIDE 5 MG/1
5 CAPSULE ORAL
Qty: 6 | Refills: 0 | Status: DISCONTINUED | COMMUNITY
Start: 2022-05-12 | End: 2022-08-16

## 2022-08-16 RX ORDER — ONDANSETRON 4 MG/1
4 TABLET ORAL
Qty: 10 | Refills: 0 | Status: DISCONTINUED | COMMUNITY
Start: 2022-05-12 | End: 2022-08-16

## 2022-08-16 RX ORDER — MELOXICAM 15 MG/1
15 TABLET ORAL
Qty: 15 | Refills: 0 | Status: DISCONTINUED | COMMUNITY
Start: 2022-05-12 | End: 2022-08-16

## 2022-08-16 NOTE — HISTORY OF PRESENT ILLNESS
[FreeTextEntry1] : Ms. Flowers is a 46 year old female with history of bilateral breast cancer.  8/11/17 S/p bilat BCS with R SLN, bilat breast reduction, oncoplastic surgery and biozorb insertion on for RIGHT UIQ [id number] Infiltrating ductal Cancer, margins 1.2 cm, Gr 2. LN 1/4, ER/HI positive and [id number] neg.  S/p L SLNBx due to microinvasion on final path 10/13/17.  Started tamoxifen 10/24/17.  Finished EBRT 2/18/18 Dr. Nogueira rad onc. \par \par Since her last visit she is status post rotator cuff repair.  Currently physical therapy for this.  Reports she has had more tension in her right pectoral muscles and chest wall since the surgery.  Continues on gabapentin denies any side effects.  Denies any erythema redness or swelling in her right upper extremity.  She does feel more tightness and heaviness.\par \par \par

## 2022-08-16 NOTE — HISTORY OF PRESENT ILLNESS
[___ Month(s) Ago] : [unfilled] month(s) ago [Fatigue] : fatigue [Arthralgias] : arthralgias [Myalgias] : myalgias [FreeTextEntry1] : Had been feeling better, though now w/ worsening joint pains over the past 2 weeks, wesly  in her hands.   [Anorexia] : no anorexia [Weight Loss] : no weight loss [Malaise] : no malaise [Fever] : no fever [Chills] : no chills [Malar Facial Rash] : no malar facial rash [Skin Lesions] : no lesions [Skin Nodules] : no skin nodules [Oral Ulcers] : no oral ulcers [Cough] : no cough [Dry Mouth] : no dry mouth [Dysphonia] : no dysphonia [Dysphagia] : no dysphagia [Shortness of Breath] : no shortness of breath [Chest Pain] : no chest pain [Joint Swelling] : no joint swelling [Joint Warmth] : no joint warmth [Joint Deformity] : no joint deformity [Decreased ROM] : no decreased range of motion [Morning Stiffness] : no morning stiffness [Falls] : no falls [Difficulty Standing] : no difficulty standing [Difficulty Walking] : no difficulty walking [Dyspnea] : no dyspnea [Muscle Weakness] : no muscle weakness [Muscle Spasms] : no muscle spasms [Muscle Cramping] : no muscle cramping [Visual Changes] : no visual changes [Eye Pain] : no eye pain [Eye Redness] : no eye redness [Dry Eyes] : no dry eyes

## 2022-08-16 NOTE — ASSESSMENT
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  Patient would like to wear compression.  Rx given for compression sleeve and gauntlet 20-30mmHg.  \par \par Follow up in 1 month.\par \par The patient had a follow-up SOZO measurement which I reviewed today. The score is -1.6 RUE. Bioimpedance spectroscopy helps identify the onset of lymphedema in an arm or leg before patients experience noticeable swelling. Research has shown that the early detection of lymphedema using L-Dex combined with treatment can reduce progression to chronic lymphedema by 95% in breast cancer patients. Whenever possible, patients are tested for baseline L-Dex score before cancer treatment begins and then are reassessed during regular follow-up visits using the SOZO device. Otherwise, this can be started postoperatively and continued during regular follow-up visits. If the patient’s L-Dex score increases above normal levels, that is a sign that lymphedema is developing, and a referral is made to physical therapy for further evaluation and/or early compression treatment. Lymphedema assessment with the SOZO L-Dex score is recommended to be done every 3 months for the first 3 years and then every 6 months for years 4 and 5, followed by annually afterwards.\par

## 2022-08-16 NOTE — PHYSICAL EXAM
[FreeTextEntry1] : Gen: Patient is A&O x 3, NAD\par HEENT: EOMI, hearing grossly normal\par Resp: regular, non - labored\par CV: pulses regular\par Skin: no rashes, erythema\par Lymph: no clubbing, cyanosis, edema, or palpable lymphadenopathy\par Inspection: no instability or misalignment\par ROM: Right shoulder abduction limited \par Palpation: TTP right pectoral muscles \par Sensation: intact to light touch\par Reflexes: 1+ and symmetric throughout\par Strength: 5/5 throughout\par Special tests: -Hawkin's sign bilateral \par Gait: normal, non-antalgic\par \par Bioimpedance spectroscopy performed today with L-Dex -1.6 RUE (post-operative baseline -1.6)\par

## 2022-08-16 NOTE — ASSESSMENT
[FreeTextEntry1] : 46 year old female with polyarticular joint pains and low back pain and found to have (+)HLA-B27 as well as diffuse enthesopathy - suggestive of undifferentiated spondyloarthropathy.  MRI L-spine reveals degenerative changes / no evidence of spondylitis.  MRI pelvis w/ no evidence of sacroiliitis.  Symptoms had been improving on sulfasalazine, but it then lost effect.  Had mild improvement on MTX, but did not tolerate it (abd pain/diarrhea). Had improved overall on MTX SC (Rasuvo) + sulfasalazine, but pt experiencing abd pain of unclear etiology - possibly due to SSZ.  Was then much improved on Rasuvo + leflunomide 20mg daily, symptoms currently recurring.  Have been unable to use biologics until now given recent hx of breast CA, though she has now been in remission for 5 years.  Pt also recently found to have a full thickness tear of the supraspinatus in the right shoulder - now s/p surgery.\par   - D/C Rasuvo.\par   - Will plan to start Enbrel.\par   - Check Quantiferon.\par   - Cont leflunomide 20mg daily.\par   - Hepatitis panel negative 11/21.  Quantiferon negative 4/2021.\par   - Flu vaccine (10/21), Pneumovax (10/21), Prevnar (8/20) UTD.  Pt has received the COVID19 vaccine + booster x 2. \par   - Cont diclofenac 75mg BID prn for now.\par   - Reiterated importance of exercise.\par   - warm compresses\par   - OTC topical analgesics.\par   - Ortho f/u.

## 2022-08-16 NOTE — REVIEW OF SYSTEMS
[Negative] : Psychiatric [FreeTextEntry9] : +breast tightness  [de-identified] : +Right arm heaviness

## 2022-09-16 ENCOUNTER — APPOINTMENT (OUTPATIENT)
Dept: FAMILY MEDICINE | Facility: CLINIC | Age: 47
End: 2022-09-16

## 2022-09-16 DIAGNOSIS — U07.1 COVID-19: ICD-10-CM

## 2022-09-16 DIAGNOSIS — Z01.818 ENCOUNTER FOR OTHER PREPROCEDURAL EXAMINATION: ICD-10-CM

## 2022-09-16 DIAGNOSIS — J06.9 ACUTE UPPER RESPIRATORY INFECTION, UNSPECIFIED: ICD-10-CM

## 2022-09-16 DIAGNOSIS — D89.89 OTHER SPECIFIED DISORDERS INVOLVING THE IMMUNE MECHANISM, NOT ELSEWHERE CLASSIFIED: ICD-10-CM

## 2022-09-16 DIAGNOSIS — R05.3 CHRONIC COUGH: ICD-10-CM

## 2022-09-16 PROCEDURE — 99213 OFFICE O/P EST LOW 20 MIN: CPT | Mod: 95

## 2022-09-16 RX ORDER — FOLIC ACID 1 MG/1
1 TABLET ORAL
Qty: 30 | Refills: 5 | Status: DISCONTINUED | COMMUNITY
Start: 2021-01-11 | End: 2022-09-16

## 2022-09-16 RX ORDER — METHOTREXATE 20 MG/.4ML
20 INJECTION, SOLUTION SUBCUTANEOUS
Qty: 4 | Refills: 3 | Status: DISCONTINUED | COMMUNITY
Start: 2021-10-12 | End: 2022-09-16

## 2022-09-16 NOTE — HISTORY OF PRESENT ILLNESS
[Home] : at home, [unfilled] , at the time of the visit. [Medical Office: ([facility])___] : at the medical office located in  [Verbal consent obtained from patient] : the patient, [unfilled] [FreeTextEntry1] : c/o 3 wk cough w/ bloody phlegm today, has traveled to 3 countries after symptoms started, has tested covid neg several times\par on biologics

## 2022-09-16 NOTE — PHYSICAL EXAM
[No Acute Distress] : no acute distress [Normal Sclera/Conjunctiva] : normal sclera/conjunctiva [EOMI] : extraocular movements intact [Normal Outer Ear/Nose] : the outer ears and nose were normal in appearance [No Respiratory Distress] : no respiratory distress  [No Accessory Muscle Use] : no accessory muscle use [Coordination Grossly Intact] : coordination grossly intact [Normal Affect] : the affect was normal [Alert and Oriented x3] : oriented to person, place, and time [Normal Insight/Judgement] : insight and judgment were intact

## 2022-09-16 NOTE — PLAN
Detail Level: Detailed
Body Location Override (Optional - Billing Will Still Be Based On Selected Body Map Location If Applicable): left lateral lower jaw
Size Of Lesion: 0.5
X Size Of Lesion In Cm (Optional): 0
Incorporate Mauc In Note: Yes
[FreeTextEntry1] : levaquin\par phenergen\par cb or rtc if no improvement

## 2022-10-06 ENCOUNTER — APPOINTMENT (OUTPATIENT)
Dept: RHEUMATOLOGY | Facility: CLINIC | Age: 47
End: 2022-10-06

## 2022-10-06 VITALS
DIASTOLIC BLOOD PRESSURE: 70 MMHG | OXYGEN SATURATION: 98 % | HEART RATE: 105 BPM | TEMPERATURE: 97.4 F | SYSTOLIC BLOOD PRESSURE: 120 MMHG

## 2022-10-06 DIAGNOSIS — Z23 ENCOUNTER FOR IMMUNIZATION: ICD-10-CM

## 2022-10-06 PROCEDURE — 99214 OFFICE O/P EST MOD 30 MIN: CPT | Mod: 25

## 2022-10-06 PROCEDURE — 90686 IIV4 VACC NO PRSV 0.5 ML IM: CPT

## 2022-10-06 PROCEDURE — G0008: CPT

## 2022-10-19 ENCOUNTER — RX RENEWAL (OUTPATIENT)
Age: 47
End: 2022-10-19

## 2022-10-25 ENCOUNTER — OUTPATIENT (OUTPATIENT)
Dept: OUTPATIENT SERVICES | Facility: HOSPITAL | Age: 47
LOS: 1 days | Discharge: HOME | End: 2022-10-25

## 2022-10-25 DIAGNOSIS — Z98.890 OTHER SPECIFIED POSTPROCEDURAL STATES: Chronic | ICD-10-CM

## 2022-10-25 DIAGNOSIS — C50.919 MALIGNANT NEOPLASM OF UNSPECIFIED SITE OF UNSPECIFIED FEMALE BREAST: ICD-10-CM

## 2022-11-01 ENCOUNTER — APPOINTMENT (OUTPATIENT)
Dept: HEMATOLOGY ONCOLOGY | Facility: CLINIC | Age: 47
End: 2022-11-01

## 2022-11-01 VITALS
DIASTOLIC BLOOD PRESSURE: 84 MMHG | BODY MASS INDEX: 31.28 KG/M2 | HEART RATE: 100 BPM | SYSTOLIC BLOOD PRESSURE: 132 MMHG | OXYGEN SATURATION: 99 % | RESPIRATION RATE: 16 BRPM | WEIGHT: 171 LBS | TEMPERATURE: 96.9 F

## 2022-11-01 PROCEDURE — 99214 OFFICE O/P EST MOD 30 MIN: CPT

## 2022-11-01 NOTE — PHYSICAL EXAM
[Fully active, able to carry on all pre-disease performance without restriction] : Status 0 - Fully active, able to carry on all pre-disease performance without restriction [Normal] : affect appropriate [de-identified] : R s/p LE with well healed scar, w/o nipple retraction skin dimpling or palp masses. L s/p LE with well healed scar, no nipple retraction skin dimpling or palp masses. B/L ax neg

## 2022-11-01 NOTE — HISTORY OF PRESENT ILLNESS
[de-identified] : Ms. Flowers is a [place] female who on June 2, 2017 saw her gynecologist, Dr. Corrigan, as she had menses twice in the same month and subsequently felt tired. She was then referred for routine screening mammogram, which was performed on June 5, 2017 with the finding of a right breast mass with associated architectural distortion suspicious for malignancy with ultrasound-guided core biopsy recommended. The patient also consequently had a bilateral breast ultrasound on the same date with a finding of a hypoechoic mass with irregular margins and posterior shadowing at the 1 o'clock axis of the right breast corresponding to the findings on the mammogram with no further suspicious findings; ultrasound-guided core biopsy was recommended. She ultimately had an ultrasound-guided core biopsy performed on June 12, 2017 with a finding of invasive moderately differentiated duct carcinoma, with the largest focus of invasive carcinoma measuring up to 5 mm with evidence of DCIS, intermediate nuclear grade, solid and cribriform types, with no evidence of lymphovascular invasion, ER positive (77%), TN positive (83%), and HER-2/carmen (1+) and negative. The patient then went on to have a bilateral breast MRI with the right breast showing, within the upper inner quadrant, a spiculated enhancing mass corresponding to the biopsy-proven cancer measuring up to 2 cm in the mediolateral direction, and 1.7 cm in the anterior-posterior direction, and 1.5 cm in the craniocaudal direction; left breast showed, within the upper outer quadrant, a somewhat serpiginous area of enhancement with the more anterior aspect becoming more nodular and measuring up to 1.1 cm in size, with additional scattered nonspecific enhancing foci; the axillae were unremarkable. 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 The patient subsequently was seen with complaint of pain and burning sensation and a new "lump" at the left breast biopsy site with a comment from the radiologist of a small hematoma with surrounding ecchymosis present on July 22, 2017, with instructions for warm compresses and pressure. The patient ultimately saw Dr. Caroline Benítez and underwent a bilateral lumpectomy and reduction mammoplasty, which was performed on August 11, 2017, at [facility] with a finding in the right breast of an invasive ductal carcinoma, moderately differentiated, measuring 2 cm in greatest diameter, nuclear, [place] score 7/9, with evidence of DCIS and LCIS present in addition to fibrocystic changes with margins negative for carcinoma, with 0/3 sentinel lymph nodes involved with carcinoma; the left breast excisional biopsy showed evidence of ductal carcinoma in situ with microinvasion, with DCIS being of high nuclear grade, solid and comedo type with central necrosis, evidence of lobular carcinoma in situ, with margins of resection negative for DCIS and microinvasion. The patient returned to the operating room on October 13, 2017 for a left sentinel lymph node biopsy with a finding of 0/3 left sentinel lymph nodes involved with carcinoma. She subsequently had Oncotype DX testing sent off on her right breast carcinoma with a finding of a recurrence score of 7, correlating to an 8% risk of distant recurrence within 10 years should she take tamoxifen alone. The patient saw a medical oncologist, Dr. Maldonado, at [facility] who recommended adjuvant chemotherapy with CMF. She also had BRCA testing sent off, specifically a BRCA 1/2 [employer]kenazi [employer]ian[employer] 3-site mutation panel, which returned negative of note.\par \par She saw me in initial consultation in 11/17 and I recommended that she proceed with further genetic predisposition testing which returned negative for other known genetic predisposition mutations. Started Tamoxifen on October 24th 2017. She completed RT at [place] () on 2/13/18. \par \par Disease: breast cancer  \par Pathology: right breast IDC; left breast DCIS \par TNM stage: T1, N0, M0 \par AJCC Stage: 1 \par \par In the past she had c/o increasing arthralgias and joint swelling. She followed up with her PCP and was found to have Lyme IgG Ab P23 s/p doxycycline 100mg BID x 21 days in the beginning of June 2019.  She was prescribed another 21 days but refused to take it due to side effects.  Since then, she reported improvement in her forgetfulness, lightheadedness, fatigue, hair thinning.  Fevers and night sweats resolved. Thyroid workup neg, mildly elevated TSH.  \par \par She was last seen in 7/19. In the interim she c/o gradually worsening arthralgias and body aches especially since 11/19. She saw a new PCP - Dr Grimm who then referred her for an ID consult, Dr Kaba, and had no serologic evidence of Lyme Dz, or other active infections. She then had a rheum consult with Dr. Charles, who assessed:\par \par "44 year old female presents with polyarticular joint pains and myalgias of unclear etiology. Some of her symptoms, including the distribution of joints (MCP's and PIP's), joint swelling, elevated CRP, and family history, are suggestive of rheumatoid arthritis, though other symptoms, including pain outside of the joints and lack of prolonged AM stiffness, are atypical. I have therefore ordered some more bloodwork and x-rays as further workup. She will follow up with me again in 2 weeks to review her results. In the meantime, I have also started her on a trial of low-dose prednisone."\par \par Pt reports prednisone did not lead to any improvement so she d/c'd it.  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 DDx also includes AE's from tamoxifen, though less likely.\par  - Rx naproxen 500mg BID.\par  - x-rays of L-spine, S-I joints.\par \par 44 year old female with polyarticular joint pains and low back pain and found to have (+)HLA-B27. 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 DDx also includes AE's from tamoxifen, though less likely.\par  - Rx naproxen 500mg BID.\par  - x-rays of L-spine, S-I joints.\par \par She ultimately was seen by Dr Charles (rheum). Found  CRP elevated, diffuse pain, undifferentiated spondyloarthropathy, inflammatory arthritis, muscle cramps, polyarticular joint pains and low back pain and found to have (+)HLA-B27. Presentation suggestive of undifferentiated spondyloarthropathy vs seronegative RA. MRI L-spine revealed degenerative changes / no evidence of spondylitis. DDx also included AE's from tamoxifen, though less likely. Symptoms had been improving on sulfasalazine, but then lost effect. Pt was recommended to start methotrexate. \par \par  \par  [de-identified] : Seen today for a f/u visit.. \par \par She cont on tamoxifen and denies an treatment related side effects. \par \par She c/o persistent intermittent, arthralgias, low back pain, muscles aches and generalized fatigue. She started Embril recently. She remains very frustrated at not feeling better. . \par \par She describes infreq positional vertigo sxs wesly on standing up from being bent over but also at other times. No change over time.  \par \par Despite her joint pain an d swelling she manages to walk approx 3 mi / day and notes over 6k steps daily. \par \par 6/22 mammo/sono neg\par 1/22 MRI neg / benign findings.

## 2022-11-01 NOTE — REVIEW OF SYSTEMS
[Negative] : Endocrine [Fever] : no fever [Chills] : no chills [Night Sweats] : no night sweats [Recent Change In Weight] : ~T no recent weight change [Abdominal Pain] : no abdominal pain [Vomiting] : no vomiting [Constipation] : no constipation [Diarrhea] : no diarrhea [FreeTextEntry2] : as above [FreeTextEntry7] : as above [FreeTextEntry9] : as above

## 2022-11-15 ENCOUNTER — APPOINTMENT (OUTPATIENT)
Dept: PHYSICAL MEDICINE AND REHAB | Facility: CLINIC | Age: 47
End: 2022-11-15

## 2022-11-15 ENCOUNTER — APPOINTMENT (OUTPATIENT)
Dept: ORTHOPEDIC SURGERY | Facility: CLINIC | Age: 47
End: 2022-11-15

## 2022-11-15 DIAGNOSIS — M25.511 PAIN IN RIGHT SHOULDER: ICD-10-CM

## 2022-11-15 DIAGNOSIS — M25.512 PAIN IN RIGHT SHOULDER: ICD-10-CM

## 2022-11-15 PROCEDURE — 99214 OFFICE O/P EST MOD 30 MIN: CPT

## 2022-11-15 PROCEDURE — 99213 OFFICE O/P EST LOW 20 MIN: CPT

## 2022-11-15 NOTE — PHYSICAL EXAM
[FreeTextEntry1] : Gen: Patient is A&O x 3, NAD\par HEENT: EOMI, hearing grossly normal\par Resp: regular, non - labored\par CV: pulses regular\par Skin: no rashes, erythema\par Lymph: no clubbing, cyanosis, edema, or palpable lymphadenopathy\par Inspection: no instability or misalignment\par ROM: Right shoulder abduction ~165 degrees \par Palpation: TTP right pectoral muscles \par Sensation: intact to light touch\par Reflexes: 1+ and symmetric throughout\par Strength: 5/5 throughout\par Special tests: -Hawkin's sign bilateral \par Gait: normal, non-antalgic\par \par

## 2022-11-15 NOTE — PHYSICAL EXAM
[de-identified] : Right shoulder exam\par \par Inspection: No swelling, ecchymosis or gross deformity.\par Skin: No masses, No lesions\par Tenderness: No bicipital tenderness, no tenderness to the greater tuberosity/RTC insertion, no anterior shoulder/lesser tuberosity tenderness. No tenderness SC joint, clavicle, AC joint.\par ROM: 160/60/T6\par Impingement tests: Positive Luna\par AC Joint: no pain with cross arm testing\par Biceps: Negative speed\par Strength: 5-/5 abduction, external rotation, and internal rotation\par Neuro: AIN, PIN, Ulnar nerve motor intact\par Sensation: Intact to light touch in radial, median, ulnar, and axillary nerve distributions\par Vasc: 2+ radial pulse

## 2022-11-15 NOTE — REVIEW OF SYSTEMS
[Negative] : Psychiatric [FreeTextEntry9] : +breast tightness  [de-identified] : +Right arm heaviness

## 2022-11-15 NOTE — ASSESSMENT
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  \par \par Follow up in 6 weeks.  \par \par

## 2022-11-15 NOTE — DISCUSSION/SUMMARY
[de-identified] : 6-month status post rotator cuff repair with exacerbation of her pain she been doing well.  She is on Enbrel now for her underlying rheumatoid arthritis.  She has full range of motion and good strength on examination.  Given prescription for Mobic side effects discussed.  If not improved consider corticosteroid injection.  Low suspicion regarding retear as she has excellent motion and strength.  All questions answered

## 2022-11-15 NOTE — HISTORY OF PRESENT ILLNESS
[FreeTextEntry1] : Ms. Flowers is a 46 year old female with history of bilateral breast cancer.  8/11/17 S/p bilat BCS with R SLN, bilat breast reduction, oncoplastic surgery and biozorb insertion on for RIGHT UIQ [id number] Infiltrating ductal Cancer, margins 1.2 cm, Gr 2. LN 1/4, ER/[place] positive and Lat0ahg neg.  S/p L SLNBx due to microinvasion on final path 10/13/17.  Started tamoxifen 10/24/17.  Finished EBRT 2/18/18 Dr. Nougeira rad onc. \par \par She reports that she finished physical therapy for her shoulder.  She reports that she is having some chest wall pain and in the breast region.  Recently followed up with oncology.  Taking gabapentin denies any side effects to this.  Denies any swelling in her upper extremities.\par \par

## 2022-11-15 NOTE — HISTORY OF PRESENT ILLNESS
[de-identified] : 48yo F  s/p Right shoulder arthroscopy, rotator cuff repair, arthroscopic suprapectoral biceps tenodesis, 5/12/22.  Overall she is doing well.  She states about 2 days ago she may have slept wrong and developed "10 out of 10" pain.    Today it is now 8 out of 10.  She is taking diclofenac with some relief.  She reports pain with overhead activity and reaching behind back.  Prior to this episode she had mild discomfort but was improving overall.  Denies numbness tingling

## 2022-11-23 ENCOUNTER — NON-APPOINTMENT (OUTPATIENT)
Age: 47
End: 2022-11-23

## 2022-11-23 ENCOUNTER — APPOINTMENT (OUTPATIENT)
Dept: CARDIOLOGY | Facility: CLINIC | Age: 47
End: 2022-11-23

## 2022-11-23 VITALS
DIASTOLIC BLOOD PRESSURE: 83 MMHG | RESPIRATION RATE: 16 BRPM | HEIGHT: 62 IN | OXYGEN SATURATION: 99 % | BODY MASS INDEX: 33.49 KG/M2 | WEIGHT: 182 LBS | SYSTOLIC BLOOD PRESSURE: 130 MMHG | HEART RATE: 84 BPM | TEMPERATURE: 98.2 F

## 2022-11-23 DIAGNOSIS — R07.9 CHEST PAIN, UNSPECIFIED: ICD-10-CM

## 2022-11-23 PROCEDURE — 93000 ELECTROCARDIOGRAM COMPLETE: CPT

## 2022-11-23 PROCEDURE — 99215 OFFICE O/P EST HI 40 MIN: CPT | Mod: 25

## 2022-11-23 NOTE — DISCUSSION/SUMMARY
[FreeTextEntry1] : 47F presents for f/u\par \par pt now with new symptoms following a vein procedure on her LE yesterday\par pt recommended to discuss with vascular team there, pt states she is waiting for a call back\par pt is comfortable appearing currently, euvolemic, denies LE pain\par trop and d dimer form ER last night negative per d/c paperwork\par will check tte and LE duplex for further eval\par d/w Pt RE: if symptoms return/worsen she must return to the ER\par \par f/u after testing\par \par >45 min spent on complete encounter [EKG obtained to assist in diagnosis and management of assessed problem(s)] : EKG obtained to assist in diagnosis and management of assessed problem(s)

## 2022-11-23 NOTE — HISTORY OF PRESENT ILLNESS
[FreeTextEntry1] : 47F presents for f/u\par Sent in by: Dr Stein\par PMD: lottie Herrmann. \par  \par Previously,\par pt last seen here for an initial eval 4/2022, endorsing SOB and decreased exercise tolerance. pt on immunosuppressive therapy for several years for ankylosing spondylitis. tte done at that time grossly unremarkable. pt also had an exercise stress test, no evidence of ischemia, \par \par pt now presents for follow up. \par today,\par \par pt states yesterday she went for a varicose vein radiofrequency ablation and later in the day she started to experience dizziness, blurry vision, cp and sob. pt went to the ER at [place] last night, trop negative, d dimer negative, cxr clear. and pt was d/c from the ER this morning. \par right now pt feeling ok. still endorsing some lightheadedness. pt states she called the vein center but has not yet received a call back. \par \par ekg today showing SR 86 bpm\par \par \par Exercise: walk daily 3-5 miles, pilates. physical therapy\par Diet: none\par \par Prior cardiac workup: none\par Recent labs:\par  \par  \par Med hx: hx of breast CA, ? ankylosing spondylitis. \par OBGYN hx: 2 children, +gest dm. regular menstrual cycles. No hx of miscarriage. \par Sx hx: breast lumpectomy\par Fam hx: breast, lung, pancreatic CA, leukemia. \par Social hx: lives in [place], with sons. (recently  11/21). not working currently. no tob. wine several times a week. no drugs. \par Meds: gabapentin, leflunomide (immunosupressant for RA) tamoxifen methotrexate. \par Allergies: nkda\par

## 2022-11-23 NOTE — REVIEW OF SYSTEMS
[Fever] : no fever [Headache] : no headache [Weight Gain (___ Lbs)] : no recent weight gain [Chills] : no chills [Feeling Fatigued] : feeling fatigued [Weight Loss (___ Lbs)] : no recent weight loss [Blurry Vision] : no blurred vision [Sore Throat] : no sore throat [SOB] : no shortness of breath [Dyspnea on exertion] : not dyspnea during exertion [Chest Discomfort] : chest discomfort [Lower Ext Edema] : no extremity edema [Palpitations] : palpitations [Orthopnea] : no orthopnea [Cough] : no cough [Wheezing] : no wheezing [Nausea] : no nausea [Vomiting] : no vomiting [Dizziness] : no dizziness [Confusion] : no confusion was observed [Easy Bleeding] : no tendency for easy bleeding [Easy Bruising] : no tendency for easy bruising

## 2022-11-28 ENCOUNTER — APPOINTMENT (OUTPATIENT)
Dept: CARDIOLOGY | Facility: CLINIC | Age: 47
End: 2022-11-28

## 2022-11-28 PROCEDURE — 93306 TTE W/DOPPLER COMPLETE: CPT

## 2022-11-28 PROCEDURE — 93970 EXTREMITY STUDY: CPT

## 2022-12-08 ENCOUNTER — APPOINTMENT (OUTPATIENT)
Dept: RHEUMATOLOGY | Facility: CLINIC | Age: 47
End: 2022-12-08

## 2022-12-08 VITALS
RESPIRATION RATE: 17 BRPM | WEIGHT: 183 LBS | BODY MASS INDEX: 33.68 KG/M2 | OXYGEN SATURATION: 99 % | DIASTOLIC BLOOD PRESSURE: 90 MMHG | HEART RATE: 91 BPM | TEMPERATURE: 98 F | SYSTOLIC BLOOD PRESSURE: 140 MMHG | HEIGHT: 62 IN

## 2022-12-08 PROCEDURE — 99214 OFFICE O/P EST MOD 30 MIN: CPT

## 2022-12-08 RX ORDER — PROMETHAZINE HYDROCHLORIDE AND DEXTROMETHORPHAN HYDROBROMIDE ORAL SOLUTION 15; 6.25 MG/5ML; MG/5ML
6.25-15 SOLUTION ORAL
Qty: 1 | Refills: 0 | Status: DISCONTINUED | COMMUNITY
Start: 2022-09-16 | End: 2022-12-08

## 2022-12-08 RX ORDER — GABAPENTIN 100 MG/1
100 CAPSULE ORAL 3 TIMES DAILY
Qty: 90 | Refills: 3 | Status: DISCONTINUED | COMMUNITY
Start: 2022-02-03 | End: 2022-12-08

## 2022-12-08 RX ORDER — LEVOFLOXACIN 500 MG/1
500 TABLET, FILM COATED ORAL DAILY
Qty: 7 | Refills: 0 | Status: DISCONTINUED | COMMUNITY
Start: 2022-09-16 | End: 2022-12-08

## 2022-12-08 RX ORDER — DICLOFENAC SODIUM 75 MG/1
75 TABLET, DELAYED RELEASE ORAL TWICE DAILY
Qty: 60 | Refills: 1 | Status: COMPLETED | COMMUNITY
Start: 2022-02-09 | End: 2022-12-08

## 2022-12-08 RX ORDER — LIDOCAINE 5 G/100G
5 OINTMENT TOPICAL
Qty: 50 | Refills: 5 | Status: ACTIVE | COMMUNITY
Start: 2022-12-08 | End: 1900-01-01

## 2022-12-08 NOTE — PHYSICAL EXAM
[General Appearance - Alert] : alert [General Appearance - In No Acute Distress] : in no acute distress [Sclera] : the sclera and conjunctiva were normal [Outer Ear] : the ears and nose were normal in appearance [Oropharynx] : the oropharynx was normal [Neck Appearance] : the appearance of the neck was normal [Neck Cervical Mass (___cm)] : no neck mass was observed [Jugular Venous Distention Increased] : there was no jugular-venous distention [Thyroid Diffuse Enlargement] : the thyroid was not enlarged [Thyroid Nodule] : there were no palpable thyroid nodules [Auscultation Breath Sounds / Voice Sounds] : lungs were clear to auscultation bilaterally [Heart Rate And Rhythm] : heart rate was normal and rhythm regular [Heart Sounds] : normal S1 and S2 [Heart Sounds Gallop] : no gallops [Murmurs] : no murmurs [Heart Sounds Pericardial Friction Rub] : no pericardial rub [Edema] : there was no peripheral edema [Bowel Sounds] : normal bowel sounds [Abdomen Soft] : soft [Abdomen Tenderness] : non-tender [Abdomen Mass (___ Cm)] : no abdominal mass palpated [Cervical Lymph Nodes Enlarged Posterior Bilaterally] : posterior cervical [Cervical Lymph Nodes Enlarged Anterior Bilaterally] : anterior cervical [Supraclavicular Lymph Nodes Enlarged Bilaterally] : supraclavicular [Skin Color & Pigmentation] : normal skin color and pigmentation [Skin Turgor] : normal skin turgor [] : no rash [No Focal Deficits] : no focal deficits [Oriented To Time, Place, And Person] : oriented to person, place, and time [Impaired Insight] : insight and judgment were intact [Affect] : the affect was normal [FreeTextEntry1] : no active synovitis; (+)tenderness in several B/L MCP's and PIP's;  unable to assess right shoulder due to recent surgery; normal ROM in rest of joints

## 2022-12-08 NOTE — HISTORY OF PRESENT ILLNESS
[___ Month(s) Ago] : [unfilled] month(s) ago [Fatigue] : fatigue [Arthralgias] : arthralgias [Myalgias] : myalgias [FreeTextEntry1] : Pt started taking Enbrel about 1 month ago.  She says that her joint pains and AM stiffness have been improving.   She also c/o numbness/tingling in her hands and feet, radiating up her LE's, worst at nights and in the mornings. [Anorexia] : no anorexia [Weight Loss] : no weight loss [Malaise] : no malaise [Fever] : no fever [Chills] : no chills [Malar Facial Rash] : no malar facial rash [Skin Lesions] : no lesions [Skin Nodules] : no skin nodules [Oral Ulcers] : no oral ulcers [Cough] : no cough [Dry Mouth] : no dry mouth [Dysphonia] : no dysphonia [Dysphagia] : no dysphagia [Shortness of Breath] : no shortness of breath [Chest Pain] : no chest pain [Joint Swelling] : no joint swelling [Joint Warmth] : no joint warmth [Joint Deformity] : no joint deformity [Decreased ROM] : no decreased range of motion [Morning Stiffness] : no morning stiffness [Falls] : no falls [Difficulty Standing] : no difficulty standing [Difficulty Walking] : no difficulty walking [Dyspnea] : no dyspnea [Muscle Weakness] : no muscle weakness [Muscle Spasms] : no muscle spasms [Muscle Cramping] : no muscle cramping [Visual Changes] : no visual changes [Eye Pain] : no eye pain [Eye Redness] : no eye redness [Dry Eyes] : no dry eyes

## 2022-12-08 NOTE — ASSESSMENT
[FreeTextEntry1] : 46 year old female with polyarticular joint pains and low back pain and found to have (+)HLA-B27 as well as diffuse enthesopathy - suggestive of undifferentiated spondyloarthropathy.  MRI L-spine reveals degenerative changes / no evidence of spondylitis.  MRI pelvis w/ no evidence of sacroiliitis.  Symptoms had been improving on sulfasalazine, but it then lost effect.  Had mild improvement on MTX, but did not tolerate it (abd pain/diarrhea). Had improved overall on MTX SC (Rasuvo) + sulfasalazine, but pt experiencing abd pain of unclear etiology - possibly due to SSZ.  Was then much improved on Rasuvo + leflunomide 20mg daily, but symptoms again began to recur.  Had been unable to use biologics until recently given recent hx of breast CA, though she has now been in remission for 5 years.  Now improving on Enbrel.   Pt also recently found to have a full thickness tear of the supraspinatus in the right shoulder - now s/p surgery.\par   - Cont Enbrel, leflunomide 20mg daily.\par   - Hepatitis panel negative 11/21.  Quantiferon negative 8/2022.\par   - Administered flu vaccine.  Pneumovax (10/21), Prevnar (8/20) UTD.  Pt has received the COVID19 vaccine + booster x 2. Recommended that she receive the bivalent booster.\par   - Cont diclofenac 75mg BID prn for now.\par   - Reiterated importance of exercise.\par   - warm compresses\par   - OTC topical analgesics.\par   - Ortho f/u.

## 2022-12-08 NOTE — ASSESSMENT
[FreeTextEntry1] : 47 year old female with polyarticular joint pains and low back pain and found to have (+)HLA-B27 as well as diffuse enthesopathy - suggestive of undifferentiated spondyloarthropathy.  MRI L-spine reveals degenerative changes / no evidence of spondylitis.  MRI pelvis w/ no evidence of sacroiliitis.  Symptoms had been improving on sulfasalazine, but it then lost effect.  Had mild improvement on MTX, but did not tolerate it (abd pain/diarrhea). Had improved overall on MTX SC (Rasuvo) + sulfasalazine, but pt experiencing abd pain of unclear etiology - possibly due to SSZ.  Was then much improved on Rasuvo + leflunomide 20mg daily, but symptoms again began to recur.  Had been unable to use biologics until recently given recent hx of breast CA, though she has now been in remission for 5 years.  Had been improving on Enbrel + leflunomide, though currently again w/ joint pains, worst in her hands.   Pt also recently found to have a full thickness tear of the supraspinatus in the right shoulder - now s/p surgery.\par   - Trial of prednisone 10mg daily x 1 week.\par   - Cont Enbrel, leflunomide 20mg daily for now.\par   - Hepatitis panel negative 11/21.  Quantiferon negative 8/2022.\par   - Flu vaccine (10/22), Pneumovax (10/21), Prevnar (8/20) UTD.  Pt has received the COVID19 vaccine + booster x 2. Recommended that she receive the bivalent booster.\par   - Check labs.\par   - Cont diclofenac 75mg BID prn for now.\par   - Reiterated importance of exercise.\par   - warm compresses\par   - OTC topical analgesics.\par   - Ortho f/u.

## 2022-12-08 NOTE — HISTORY OF PRESENT ILLNESS
[___ Month(s) Ago] : [unfilled] month(s) ago [Fatigue] : fatigue [Arthralgias] : arthralgias [Myalgias] : myalgias [FreeTextEntry1] : Pt reports that her joint pains have worsened over the past 2-3 weeks. Her knees were swollen last week, though no current joint swelling.  (+)AM stiffness x 40 minutes.  \par  [Anorexia] : no anorexia [Weight Loss] : no weight loss [Malaise] : no malaise [Fever] : no fever [Chills] : no chills [Malar Facial Rash] : no malar facial rash [Skin Lesions] : no lesions [Skin Nodules] : no skin nodules [Oral Ulcers] : no oral ulcers [Cough] : no cough [Dry Mouth] : no dry mouth [Dysphonia] : no dysphonia [Dysphagia] : no dysphagia [Shortness of Breath] : no shortness of breath [Chest Pain] : no chest pain [Joint Swelling] : no joint swelling [Joint Warmth] : no joint warmth [Joint Deformity] : no joint deformity [Decreased ROM] : no decreased range of motion [Morning Stiffness] : no morning stiffness [Falls] : no falls [Difficulty Standing] : no difficulty standing [Difficulty Walking] : no difficulty walking [Dyspnea] : no dyspnea [Muscle Weakness] : no muscle weakness [Muscle Spasms] : no muscle spasms [Muscle Cramping] : no muscle cramping [Visual Changes] : no visual changes [Eye Pain] : no eye pain [Eye Redness] : no eye redness [Dry Eyes] : no dry eyes

## 2022-12-21 ENCOUNTER — NON-APPOINTMENT (OUTPATIENT)
Age: 47
End: 2022-12-21

## 2022-12-29 ENCOUNTER — APPOINTMENT (OUTPATIENT)
Dept: PHYSICAL MEDICINE AND REHAB | Facility: CLINIC | Age: 47
End: 2022-12-29

## 2022-12-29 PROCEDURE — 99214 OFFICE O/P EST MOD 30 MIN: CPT

## 2022-12-29 RX ORDER — GABAPENTIN 300 MG/1
300 CAPSULE ORAL 3 TIMES DAILY
Qty: 90 | Refills: 3 | Status: ACTIVE | COMMUNITY
Start: 2022-11-15 | End: 1900-01-01

## 2022-12-29 NOTE — ASSESSMENT
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  \par \par

## 2022-12-29 NOTE — HISTORY OF PRESENT ILLNESS
[FreeTextEntry1] : Ms. Flowers is a 47 year old female with history of bilateral breast cancer.  8/11/17 S/p bilat BCS with R SLN, bilat breast reduction, oncoplastic surgery and biozorb insertion on for RIGHT UIQ [id number] Infiltrating ductal Cancer, margins 1.2 cm, Gr 2. LN 1/4, ER/FL positive and Vex1nct neg.  S/p L SLNBx due to microinvasion on final path 10/13/17.  Started tamoxifen 10/24/17.  Finished EBRT 2/18/18 Dr. Nogueira rad onc. \par \par She reports she is still feeling tightness in her chest wall.  Did not yet increase gabapentin.  Denies any swelling or heaviness in her extremities.

## 2023-01-10 ENCOUNTER — NON-APPOINTMENT (OUTPATIENT)
Age: 48
End: 2023-01-10

## 2023-01-17 ENCOUNTER — APPOINTMENT (OUTPATIENT)
Dept: ORTHOPEDIC SURGERY | Facility: CLINIC | Age: 48
End: 2023-01-17
Payer: MEDICAID

## 2023-01-17 DIAGNOSIS — M54.2 CERVICALGIA: ICD-10-CM

## 2023-01-17 PROCEDURE — 73030 X-RAY EXAM OF SHOULDER: CPT | Mod: RT

## 2023-01-17 PROCEDURE — 72040 X-RAY EXAM NECK SPINE 2-3 VW: CPT

## 2023-01-17 PROCEDURE — 99213 OFFICE O/P EST LOW 20 MIN: CPT

## 2023-01-17 NOTE — PHYSICAL EXAM
[de-identified] : Right shoulder exam\par \par Inspection: No swelling, ecchymosis or gross deformity.\par Skin: No masses, No lesions\par Tenderness: No bicipital tenderness, no tenderness to the greater tuberosity/RTC insertion, no anterior shoulder/lesser tuberosity tenderness. No tenderness SC joint, clavicle, AC joint.\par Neck: There is paraspinal tenderness.  There is pain with range of motion throughout the neck.  There is a positive Spurling's\par ROM: 160/60/T6\par Impingement tests: Positive Luna\par AC Joint: no pain with cross arm testing\par Biceps: Negative speed\par Strength: 5-/5 abduction, external rotation, and internal rotation\par Neuro: AIN, PIN, Ulnar nerve motor intact\par Sensation: Intact to light touch in radial, median, ulnar, and axillary nerve distributions\par Vasc: 2+ radial pulse [de-identified] : \par The following radiographs were ordered and read by me during this patients visit. I reviewed each radiograph in detail with the patient and discussed the findings as highlighted below. \par 3 views right shoulder obtained today.  This postsurgical changes at the greater tuberosity.  The glenohumeral joint is well-maintained\par \par AP and lateral of the cervical spine was obtained today.  There is normal alignment.  There is well-maintained disc spaces

## 2023-01-17 NOTE — DISCUSSION/SUMMARY
[de-identified] : 47-year-old female with right shoulder pain neck pain radicular pattern of pain.  She has history of inflammatory arthropathy and has pain in multiple sites of her body.  I have encouraged rheumatology follow-up.  In terms of her neck and shoulder she did have surgery 6 months ago to repair her rotator cuff she has good rotator cuff strength today.  However we will obtain an MRI to further evaluate and rule out any areas that did not heal we will also obtain MRI of her cervical spine given her radicular pattern of pain.  She will follow-up after MRI.  All questions answered

## 2023-01-17 NOTE — HISTORY OF PRESENT ILLNESS
[de-identified] : 46yo F s/p Right shoulder arthroscopy, rotator cuff repair, arthroscopic suprapectoral biceps tenodesis, 5/12/22.  She was started on Mobic at her last visit which she stopped taking because of elevated her blood pressure.  She is continue to complain of pain in her shoulder pain in her neck and multiple other parts of her body.  She reports pain radiates to her hand with numbness and tingling.  She has stopped her physical therapy 3 months ago

## 2023-01-19 ENCOUNTER — APPOINTMENT (OUTPATIENT)
Dept: RHEUMATOLOGY | Facility: CLINIC | Age: 48
End: 2023-01-19
Payer: MEDICAID

## 2023-01-19 ENCOUNTER — RX RENEWAL (OUTPATIENT)
Age: 48
End: 2023-01-19

## 2023-01-19 VITALS
BODY MASS INDEX: 34.04 KG/M2 | RESPIRATION RATE: 17 BRPM | DIASTOLIC BLOOD PRESSURE: 70 MMHG | TEMPERATURE: 98 F | SYSTOLIC BLOOD PRESSURE: 110 MMHG | HEART RATE: 72 BPM | OXYGEN SATURATION: 98 % | HEIGHT: 62 IN | WEIGHT: 185 LBS

## 2023-01-19 PROCEDURE — 99215 OFFICE O/P EST HI 40 MIN: CPT

## 2023-01-19 RX ORDER — LEFLUNOMIDE 20 MG/1
20 TABLET, FILM COATED ORAL DAILY
Qty: 30 | Refills: 3 | Status: ACTIVE | COMMUNITY
Start: 2022-06-21 | End: 1900-01-01

## 2023-01-19 RX ORDER — MELOXICAM 15 MG/1
15 TABLET ORAL
Qty: 30 | Refills: 0 | Status: DISCONTINUED | COMMUNITY
Start: 2022-11-15 | End: 2023-01-19

## 2023-01-19 RX ORDER — ETANERCEPT 50 MG/ML
50 SOLUTION SUBCUTANEOUS
Qty: 1 | Refills: 11 | Status: DISCONTINUED | COMMUNITY
Start: 2022-09-07 | End: 2023-01-19

## 2023-01-19 NOTE — DATA REVIEWED
[FreeTextEntry1] : Hgb 3.3;  rest of CBC unremarkable\par Ca2+ 8.6; rest of CMP unremarkable\par ESR 18\par CRP 16 mg/L

## 2023-01-19 NOTE — HISTORY OF PRESENT ILLNESS
[___ Month(s) Ago] : [unfilled] month(s) ago [Fatigue] : fatigue [Arthralgias] : arthralgias [Myalgias] : myalgias [FreeTextEntry1] : Pt c/o severe pain in her lower back, shoulders, and knees - worse at rest.  (+)intermittent swelling in her hands.  (+)AM stiffness x 45 minutes, worst in her lower back.   No significant improvement w/ prednisone x 1 week.   [Anorexia] : no anorexia [Weight Loss] : no weight loss [Malaise] : no malaise [Fever] : no fever [Chills] : no chills [Malar Facial Rash] : no malar facial rash [Skin Lesions] : no lesions [Skin Nodules] : no skin nodules [Oral Ulcers] : no oral ulcers [Cough] : no cough [Dry Mouth] : no dry mouth [Dysphonia] : no dysphonia [Dysphagia] : no dysphagia [Shortness of Breath] : no shortness of breath [Chest Pain] : no chest pain [Joint Swelling] : no joint swelling [Joint Warmth] : no joint warmth [Joint Deformity] : no joint deformity [Decreased ROM] : no decreased range of motion [Morning Stiffness] : no morning stiffness [Falls] : no falls [Difficulty Standing] : no difficulty standing [Difficulty Walking] : no difficulty walking [Dyspnea] : no dyspnea [Muscle Weakness] : no muscle weakness [Muscle Spasms] : no muscle spasms [Muscle Cramping] : no muscle cramping [Visual Changes] : no visual changes [Eye Pain] : no eye pain [Eye Redness] : no eye redness [Dry Eyes] : no dry eyes

## 2023-01-19 NOTE — ASSESSMENT
[FreeTextEntry1] : 47 year old female with polyarticular joint pains and low back pain and found to have (+)HLA-B27 as well as diffuse enthesopathy - suggestive of undifferentiated spondyloarthropathy.  MRI L-spine reveals degenerative changes / no evidence of spondylitis.  MRI pelvis w/ no evidence of sacroiliitis.  Symptoms had been improving on sulfasalazine, but it then lost effect.  Had mild improvement on MTX, but did not tolerate it (abd pain/diarrhea). Had improved overall on MTX SC (Rasuvo) + sulfasalazine, but pt experiencing abd pain of unclear etiology - possibly due to SSZ.  Was then much improved on Rasuvo + leflunomide 20mg daily, but symptoms again began to recur.  Had been unable to use biologics until recently given recent hx of breast CA, though she has now been in remission for 5 years.  Had been improving on Enbrel + leflunomide, though currently again w/ joint pains.   Pt also recently found to have a full thickness tear of the supraspinatus in the right shoulder - now s/p surgery.\par   - D/C Enbrel, start Cosentyx.  Cont leflunomide 20mg daily for now.\par   - Hepatitis panel negative 11/21.  Quantiferon negative 8/2022.\par   - Flu vaccine (10/22), Pneumovax (10/21), Prevnar (8/20) UTD.  Pt has received the COVID19 vaccine + booster  + bivalent booster.  Recommended Shingrix.\par   - Cont diclofenac 75mg BID prn for now.\par   - Reiterated importance of exercise.\par   - warm compresses\par   - OTC topical analgesics.\par   - Ortho f/u.

## 2023-01-19 NOTE — PHYSICAL EXAM
[General Appearance - Alert] : alert [General Appearance - In No Acute Distress] : in no acute distress [Sclera] : the sclera and conjunctiva were normal [Outer Ear] : the ears and nose were normal in appearance [Oropharynx] : the oropharynx was normal [Neck Appearance] : the appearance of the neck was normal [Neck Cervical Mass (___cm)] : no neck mass was observed [Jugular Venous Distention Increased] : there was no jugular-venous distention [Thyroid Diffuse Enlargement] : the thyroid was not enlarged [Thyroid Nodule] : there were no palpable thyroid nodules [Auscultation Breath Sounds / Voice Sounds] : lungs were clear to auscultation bilaterally [Heart Rate And Rhythm] : heart rate was normal and rhythm regular [Heart Sounds] : normal S1 and S2 [Heart Sounds Gallop] : no gallops [Murmurs] : no murmurs [Heart Sounds Pericardial Friction Rub] : no pericardial rub [Edema] : there was no peripheral edema [Bowel Sounds] : normal bowel sounds [Abdomen Soft] : soft [Abdomen Tenderness] : non-tender [Abdomen Mass (___ Cm)] : no abdominal mass palpated [Cervical Lymph Nodes Enlarged Posterior Bilaterally] : posterior cervical [Cervical Lymph Nodes Enlarged Anterior Bilaterally] : anterior cervical [Supraclavicular Lymph Nodes Enlarged Bilaterally] : supraclavicular [Skin Color & Pigmentation] : normal skin color and pigmentation [Skin Turgor] : normal skin turgor [] : no rash [No Focal Deficits] : no focal deficits [Oriented To Time, Place, And Person] : oriented to person, place, and time [Impaired Insight] : insight and judgment were intact [Affect] : the affect was normal [FreeTextEntry1] : no active synovitis; (+)tenderness in several B/L MCP's, PIP's, and DIP's;  B/L shoulders w/ pain upon movement in all planes; normal ROM in rest of joints

## 2023-02-02 ENCOUNTER — NON-APPOINTMENT (OUTPATIENT)
Age: 48
End: 2023-02-02

## 2023-02-02 NOTE — DATA REVIEWED
[FreeTextEntry1] : 1/21/22, ZP, MRI: Bilat scattered enhancing nonspecific foci, no susp findings, post surg changes noted, no sig lymphadenopathy, no MRI evidence of malignancy, BR2 Need for prophylactic measure

## 2023-02-09 ENCOUNTER — APPOINTMENT (OUTPATIENT)
Dept: PHYSICAL MEDICINE AND REHAB | Facility: CLINIC | Age: 48
End: 2023-02-09
Payer: MEDICAID

## 2023-02-09 VITALS
WEIGHT: 185 LBS | BODY MASS INDEX: 34.04 KG/M2 | HEIGHT: 62 IN | SYSTOLIC BLOOD PRESSURE: 161 MMHG | DIASTOLIC BLOOD PRESSURE: 99 MMHG

## 2023-02-09 PROCEDURE — 93702 BIS XTRACELL FLUID ANALYSIS: CPT

## 2023-02-09 PROCEDURE — 99214 OFFICE O/P EST MOD 30 MIN: CPT | Mod: 25

## 2023-02-09 NOTE — PHYSICAL EXAM
[FreeTextEntry1] : Gen: Patient is A&O x 3, NAD\par HEENT: EOMI, hearing grossly normal\par Resp: regular, non - labored\par CV: pulses regular\par Skin: no rashes, erythema\par Lymph: no clubbing, cyanosis, edema, or palpable lymphadenopathy\par Inspection: no instability or misalignment\par ROM: Right shoulder abduction ~165 degrees \par Palpation: TTP right pectoral muscles \par Sensation: intact to light touch\par Reflexes: 1+ and symmetric throughout\par Strength: 5/5 throughout\par Special tests: -Hawkin's sign bilateral \par Gait: normal, non-antalgic\par \par Bioimpedance spectroscopy performed today with L-Dex -1.5 RUE (post-operative baseline -1.6)

## 2023-02-09 NOTE — HISTORY OF PRESENT ILLNESS
[FreeTextEntry1] : Ms. Flowers is a 47 year old female with history of bilateral breast cancer.  8/11/17 S/p bilat BCS with R SLN, bilat breast reduction, oncoplastic surgery and biozorb insertion on for RIGHT UIQ [id number] Infiltrating ductal Cancer, margins 1.2 cm, Gr 2. LN 1/4, ER/[place] positive and Efr1nfq neg.  S/p L SLNBx due to microinvasion on final path 10/13/17.  Started tamoxifen 10/24/17.  Finished EBRT 2/18/18 Dr. Nogueira rad onc. \par \par She reports that she is working with occupational therapy which helping her chest wall tightness.  She increase her gabapentin to 300 mg 3 times a day and states this is helping.  Denies any side effects.  Reports an episode where she felt like her arms were swelling.  Today she denies any swelling redness heaviness or erythema.

## 2023-02-09 NOTE — ASSESSMENT
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 \par -Continue occupational therapy, breast rehabilitation program for myofascial release and manual therapy\par -Case discussed with Jeannette Montgomery, can continue with myofascial release, follow precautions per orthopedics  \par \par #Neuropathic Pain:\par -Continue gabapentin 300mg TID-Rx sent \par \par #At risk for lymphedema:\par -No no clinical signs of lymphedema on exam.\par -Bioimpedance spectrsocopy performed today with no significant increase in L-dex score \par -Patient has compression sleeve and gauntlet 20-30mmHg to be worn if develops symptoms, she was instructed to put on and call me immediately.\par -6 months surveillance with bioimpedance spectroscopy, August 2023  \par \par Follow up in 2 months.  \par \par

## 2023-02-13 ENCOUNTER — NON-APPOINTMENT (OUTPATIENT)
Age: 48
End: 2023-02-13

## 2023-02-21 ENCOUNTER — APPOINTMENT (OUTPATIENT)
Dept: BREAST CENTER | Facility: CLINIC | Age: 48
End: 2023-02-21
Payer: MEDICAID

## 2023-02-21 VITALS
DIASTOLIC BLOOD PRESSURE: 91 MMHG | HEIGHT: 62 IN | HEART RATE: 92 BPM | SYSTOLIC BLOOD PRESSURE: 160 MMHG | BODY MASS INDEX: 33.13 KG/M2 | WEIGHT: 180 LBS | TEMPERATURE: 97.3 F

## 2023-02-21 DIAGNOSIS — Z00.00 ENCOUNTER FOR GENERAL ADULT MEDICAL EXAMINATION W/OUT ABNORMAL FINDINGS: ICD-10-CM

## 2023-02-21 DIAGNOSIS — Z91.89 OTHER SPECIFIED PERSONAL RISK FACTORS, NOT ELSEWHERE CLASSIFIED: ICD-10-CM

## 2023-02-21 PROCEDURE — 99214 OFFICE O/P EST MOD 30 MIN: CPT | Mod: 1L

## 2023-02-21 NOTE — HISTORY OF PRESENT ILLNESS
[FreeTextEntry1] : 46 y/o BRCA neg female here for f.u for CBE and review recent MRI. She has a hx of bilat BCT, pt is 5 years out. Pt denies any breast lesions, discharge or masses.\par 8/11/17 S/p bilat BCS with R SLN, bilat breast reduction, oncoplastic surgery and biozorb insertion on for RIGHT UIQ [id number] Infiltrating ductal Cancer, margins 1.2 cm, Gr 2. LN 1/4, ER/[place] positive and [id number] neg. Oncotype was 7. LEFT was pT1mic, N0, Margins neg at 5 mm. ER+/IL neg and Her2 neg. s/p L SLNBx due to microinvasion on final path 10/13/17. \par \par Sees medical oncologist: aWde Alejo, Started tamoxifen 10/2017. She continues to cycle monthly\par Seeing a new GYN, Dr. Marsha Stanley with [facility], next week. \par Finished EBRT 2/18/18 Dr. Nogueira rad onc. Discharged plastic surgery Dr. Gatica.\par Sees Rheum - Dr. Charles. Started Cosentyx, also on leflunomide.\par Had R shoulder arthroscopy 5/22 w/ Dr. Ti Coombs, still continues to have pain. \par Sees Dr. Dugan for PMR, on gabapentin 300mg TID. Last Sozo was 2/23 and within normal limits. \par \par 1/8/21, ZP, MRI: bilat scattered nonspecific enhancing foci, post surg changes, no susp findings, no significant adenopathy, continued bilat MRI f/u rec in 1 year, BR1\par 6/30/21, ZP, Bilat sMMG: het dense, no sup findings, biozorb in places and surgical clips in axilla, rec U/S for dense breasts and mmg 1 year, BR2\par 6/30/21, ZP, Bilat U/S: R post surg changes 1:00 w/o assoc mass, no susp findings, L post sutg changes several nodes ranging up to 1.2cm stable, subcentimeter cyst present RA region, no susp findings, no adenopathy bilat, BR2\par 1/21/22, ZP, MRI: Bilat scattered enhancing nonspecific foci, no susp findings, post surg changes noted, no sig lymphadenopathy, no MRI evidence of malignancy, BR2\par \par 6/27/22: ZP sMMG: Dense breast: biozorbs in place, BR2. \par 6/27/22 ZP Fofana/S: Right 1:00 4 cm FTN, subcm cluster of cysts at 2:00, Left 1:00 9cm FTN, postop. BR2. \par 2/9/23 ZP, MRI: Het FG tissue. Scattered enhancing nonspecific foci bilaterally. No susp enhancing lesions bilaterally. Postsurg changes noted bilaterally. No sign lymphadenopathy. No MRI evidence of malignancy. BR2

## 2023-02-21 NOTE — ASSESSMENT
[FreeTextEntry1] : 48 y/o BRCA neg female here for f.u for CBE and review recent MRI. She has a hx of bilat BCT, pt is 5 years out. Pt denies any breast lesions, discharge or masses.\par 8/11/17 S/p bilat BCS with R SLN, bilat breast reduction, oncoplastic surgery and biozorb insertion on for RIGHT UIQ [id number] Infiltrating ductal Cancer, margins 1.2 cm, Gr 2. LN 1/4, ER/NH positive and Vmr2brx neg. Oncotype was 7. LEFT was pT1mic, N0, Margins neg at 5 mm. ER+/NH neg and Her2 neg. s/p L SLNBx due to microinvasion on final path 10/13/17. \par \par Sees medical oncologist: Wade Alejo, Started tamoxifen 10/2017. She continues to cycle monthly\par Seeing a new GYN, Dr. Marsha Stanley with [facility], next week. \par Finished EBRT 2/18/18 Dr. Nogueira rad onc. Discharged plastic surgery Dr. Gatica.\par Sees Rheum - Dr. Charles. Started Cosentyx, also on leflunomide.\par Had R shoulder arthroscopy 5/22 w/ Dr. Ti Coombs, still continues to have pain. \par Sees Dr. Dugan for PMR, on gabapentin 300mg TID. Last Sozo was 2/23 and within normal limits. \par \par 1/8/21, ZP, MRI: bilat scattered nonspecific enhancing foci, post surg changes, no susp findings, no significant adenopathy, continued bilat MRI f/u rec in 1 year, BR1\par 6/30/21, ZP, Bilat sMMG: het dense, no sup findings, biozorb in places and surgical clips in axilla, rec U/S for dense breasts and mmg 1 year, BR2\par 6/30/21, ZP, Bilat U/S: R post surg changes 1:00 w/o assoc mass, no susp findings, L post sutg changes several nodes ranging up to 1.2cm stable, subcentimeter cyst present RA region, no susp findings, no adenopathy bilat, BR2\par 1/21/22, ZP, MRI: Bilat scattered enhancing nonspecific foci, no susp findings, post surg changes noted, no sig lymphadenopathy, no MRI evidence of malignancy, BR2\par \par 6/27/22: ZP sMMG: Dense breast: biozorbs in place, BR2. \par 6/27/22 ZP Fofana/S: Right 1:00 4 cm FTN, subcm cluster of cysts at 2:00, Left 1:00 9cm FTN, postop. BR2. \par 2/9/23 ZP, MRI: Het FG tissue. Scattered enhancing nonspecific foci bilaterally. No susp enhancing lesions bilaterally. Postsurg changes noted bilaterally. No sign lymphadenopathy. No MRI evidence of malignancy. BR2\par \par CBE: Bilat reduction scars. Excellent cosmesis. Bilat lumpy bumpy breasts. No adenopathy. CORIE\par ROM limited at shoulders - R>L, patient continues to have pain s/p surgery. No lymphedema present at this time, and sozo within normal limits this month.\par \par Reviewed MRI, no suspicious findings. Discussed with patient while she is 5 years out given age of diagnose would still recommend she continue with screening MRI. She is agreeable to plan. MMG and US due 7/23, will follow up with myself or GYN at that time for CBE. Continue to follow with Dr. Dugan. Continue with PT.

## 2023-02-21 NOTE — DATA REVIEWED
[FreeTextEntry1] : 2/9/23 ZP, MRI: Het FG tissue. Scattered enhancing nonspecific foci bilaterally. No susp enhancing lesions bilaterally. Postsurg changes noted bilaterally. No sign lymphadenopathy. No MRI evidence of malignancy. BR2

## 2023-02-21 NOTE — PHYSICAL EXAM
[Normocephalic] : normocephalic [Atraumatic] : atraumatic [Supple] : supple [No Supraclavicular Adenopathy] : no supraclavicular adenopathy [No Thyromegaly] : no thyromegaly [Examined in the supine and seated position] : examined in the supine and seated position [No dominant masses] : no dominant masses in right breast  [No dominant masses] : no dominant masses left breast [No Nipple Retraction] : no left nipple retraction [No Nipple Discharge] : no left nipple discharge [No Axillary Lymphadenopathy] : no left axillary lymphadenopathy [No Edema] : no edema [No Swelling] : no swelling [Full ROM] : full range of motion [No Rashes] : no rashes [No Ulceration] : no ulceration [de-identified] : R limited ROM more than L - secondary to pain.

## 2023-02-21 NOTE — PAST MEDICAL HISTORY
[Menstruating] : The patient is menstruating [Menarche Age ____] : age at menarche was [unfilled] [Definite ___ (Date)] : the last menstrual period was [unfilled] [Total Preg ___] : G[unfilled] [Living ___] : Living: [unfilled] [Age At Live Birth ___] : Age at live birth: [unfilled] [FreeTextEntry6] : none [FreeTextEntry7] : 6 mos [FreeTextEntry8] : 6 mos

## 2023-03-02 ENCOUNTER — APPOINTMENT (OUTPATIENT)
Dept: RHEUMATOLOGY | Facility: CLINIC | Age: 48
End: 2023-03-02
Payer: MEDICAID

## 2023-03-02 VITALS
HEART RATE: 88 BPM | HEIGHT: 62 IN | OXYGEN SATURATION: 99 % | SYSTOLIC BLOOD PRESSURE: 126 MMHG | DIASTOLIC BLOOD PRESSURE: 90 MMHG | TEMPERATURE: 98.4 F

## 2023-03-02 PROCEDURE — 99214 OFFICE O/P EST MOD 30 MIN: CPT

## 2023-03-02 RX ORDER — PREDNISONE 10 MG/1
10 TABLET ORAL
Qty: 7 | Refills: 0 | Status: DISCONTINUED | COMMUNITY
Start: 2022-12-08 | End: 2023-03-02

## 2023-03-10 ENCOUNTER — NON-APPOINTMENT (OUTPATIENT)
Age: 48
End: 2023-03-10

## 2023-04-11 ENCOUNTER — APPOINTMENT (OUTPATIENT)
Dept: PHYSICAL MEDICINE AND REHAB | Facility: CLINIC | Age: 48
End: 2023-04-11

## 2023-04-14 ENCOUNTER — NON-APPOINTMENT (OUTPATIENT)
Age: 48
End: 2023-04-14

## 2023-04-17 ENCOUNTER — NON-APPOINTMENT (OUTPATIENT)
Age: 48
End: 2023-04-17

## 2023-04-25 ENCOUNTER — OUTPATIENT (OUTPATIENT)
Dept: OUTPATIENT SERVICES | Facility: HOSPITAL | Age: 48
LOS: 1 days | Discharge: HOME | End: 2023-04-25

## 2023-04-25 DIAGNOSIS — Z98.890 OTHER SPECIFIED POSTPROCEDURAL STATES: Chronic | ICD-10-CM

## 2023-04-25 DIAGNOSIS — C50.919 MALIGNANT NEOPLASM OF UNSPECIFIED SITE OF UNSPECIFIED FEMALE BREAST: ICD-10-CM

## 2023-04-28 ENCOUNTER — APPOINTMENT (OUTPATIENT)
Dept: CARDIOLOGY | Facility: CLINIC | Age: 48
End: 2023-04-28
Payer: MEDICAID

## 2023-04-28 VITALS
DIASTOLIC BLOOD PRESSURE: 83 MMHG | HEIGHT: 62 IN | TEMPERATURE: 97.6 F | HEART RATE: 66 BPM | WEIGHT: 179 LBS | SYSTOLIC BLOOD PRESSURE: 139 MMHG | BODY MASS INDEX: 32.94 KG/M2 | OXYGEN SATURATION: 100 %

## 2023-04-28 PROCEDURE — 93000 ELECTROCARDIOGRAM COMPLETE: CPT

## 2023-04-28 PROCEDURE — 99215 OFFICE O/P EST HI 40 MIN: CPT | Mod: 25

## 2023-04-28 NOTE — HISTORY OF PRESENT ILLNESS
[FreeTextEntry1] : 47F presents for f/u\par Sent in by: Dr Stein\par PMD: lottie Herrmann. \par  \par Previously,\par pt seen here for an initial eval 4/2022, endorsing SOB and decreased exercise tolerance. pt on immunosuppressive therapy for several years for ankylosing spondylitis. tte done at that time grossly unremarkable. pt also had an exercise stress test, no evidence of ischemia.\par last seen 11/22 with dizziness immediately following a LE venous ablation. went to the ER at [facility], states trop negative, d dimer negative, cxr clear. and d/c from the ER. \par at last visit, pt comfortable appearing.  tte and LE duplex done at that time, both grossly unremarkable\par \par pt now presents for follow up. \par today,\par \par pt was hospitalized at [facility] 4/9-1/15 with a uti and human meta pneumovirus, also with hypertensive urgency on arrival. pt states she was started on BP meds while there, lopressor 12.5 bid,  bp today here 139/83. \par pt wants to stop it.\par pt checks her bp at home, usually gets 120/70s. \par \par pt feeling well, no complaints, no cp sob, no HA, blurry vision. denies palpitations\par \par \par ekg today showing SR 63 bpm\par \par \par Exercise: walk daily 3-5 miles, pilates. physical therapy\par Diet: none\par \par Prior cardiac workup: none\par Recent labs:\par  \par  \par Med hx: hx of breast CA, ? ankylosing spondylitis. \par OBGYN hx: 2 children, +gest dm. regular menstrual cycles. No hx of miscarriage. \par Sx hx: breast lumpectomy\par Fam hx: breast, lung, pancreatic CA, leukemia. \par Social hx: lives in [place], with sons. (recently  11/21). not working currently. no tob. wine several times a week. no drugs. \par Meds: gabapentin, leflunomide (immunosupressant for RA) tamoxifen methotrexate. \par Allergies: nkda\par

## 2023-04-28 NOTE — REVIEW OF SYSTEMS
[Fever] : no fever [Headache] : no headache [Weight Gain (___ Lbs)] : no recent weight gain [Chills] : no chills [Feeling Fatigued] : not feeling fatigued [Weight Loss (___ Lbs)] : no recent weight loss [Blurry Vision] : no blurred vision [Sore Throat] : no sore throat [SOB] : no shortness of breath [Dyspnea on exertion] : not dyspnea during exertion [Chest Discomfort] : no chest discomfort [Lower Ext Edema] : no extremity edema [Palpitations] : no palpitations [Orthopnea] : no orthopnea [Cough] : no cough [Wheezing] : no wheezing [Nausea] : no nausea [Vomiting] : no vomiting [Dizziness] : no dizziness [Confusion] : no confusion was observed [Easy Bleeding] : no tendency for easy bleeding [Easy Bruising] : no tendency for easy bruising

## 2023-04-28 NOTE — DISCUSSION/SUMMARY
[FreeTextEntry1] : 47F presents for f/u\par \par 1. elevated BP\par -pt started on toprol 12.5 in while in the hospital for uti and viral pna (on admission there found to be in hypertensive urgency)\par -pt does not with to cont toprol\par -d/w pt benefits of continuing\par -pt would like to stop it\par -would keep bp log and pt to call in one month to discuss readings\par \par \par f/u 6 months\par >40 min spent on complete encounter\par  [EKG obtained to assist in diagnosis and management of assessed problem(s)] : EKG obtained to assist in diagnosis and management of assessed problem(s)

## 2023-05-02 ENCOUNTER — APPOINTMENT (OUTPATIENT)
Dept: HEMATOLOGY ONCOLOGY | Facility: CLINIC | Age: 48
End: 2023-05-02
Payer: MEDICAID

## 2023-05-02 ENCOUNTER — EMERGENCY (EMERGENCY)
Facility: HOSPITAL | Age: 48
LOS: 1 days | Discharge: HOME | End: 2023-05-02
Attending: STUDENT IN AN ORGANIZED HEALTH CARE EDUCATION/TRAINING PROGRAM | Admitting: STUDENT IN AN ORGANIZED HEALTH CARE EDUCATION/TRAINING PROGRAM
Payer: MEDICAID

## 2023-05-02 VITALS
BODY MASS INDEX: 32.74 KG/M2 | TEMPERATURE: 96.5 F | RESPIRATION RATE: 16 BRPM | WEIGHT: 178.99 LBS | HEART RATE: 90 BPM | DIASTOLIC BLOOD PRESSURE: 84 MMHG | OXYGEN SATURATION: 98 % | SYSTOLIC BLOOD PRESSURE: 134 MMHG

## 2023-05-02 VITALS
TEMPERATURE: 99 F | HEART RATE: 91 BPM | DIASTOLIC BLOOD PRESSURE: 80 MMHG | RESPIRATION RATE: 16 BRPM | OXYGEN SATURATION: 100 % | SYSTOLIC BLOOD PRESSURE: 140 MMHG

## 2023-05-02 VITALS
TEMPERATURE: 98 F | SYSTOLIC BLOOD PRESSURE: 148 MMHG | OXYGEN SATURATION: 100 % | DIASTOLIC BLOOD PRESSURE: 76 MMHG | HEART RATE: 85 BPM | RESPIRATION RATE: 16 BRPM

## 2023-05-02 DIAGNOSIS — H53.9 UNSPECIFIED VISUAL DISTURBANCE: ICD-10-CM

## 2023-05-02 DIAGNOSIS — Z98.890 OTHER SPECIFIED POSTPROCEDURAL STATES: Chronic | ICD-10-CM

## 2023-05-02 LAB
ALBUMIN SERPL ELPH-MCNC: 3.8 G/DL — SIGNIFICANT CHANGE UP (ref 3.3–5)
ALP SERPL-CCNC: 54 U/L — SIGNIFICANT CHANGE UP (ref 40–120)
ALT FLD-CCNC: 16 U/L — SIGNIFICANT CHANGE UP (ref 4–33)
ANION GAP SERPL CALC-SCNC: 12 MMOL/L — SIGNIFICANT CHANGE UP (ref 7–14)
AST SERPL-CCNC: 30 U/L — SIGNIFICANT CHANGE UP (ref 4–32)
BASOPHILS # BLD AUTO: 0.03 K/UL — SIGNIFICANT CHANGE UP (ref 0–0.2)
BASOPHILS NFR BLD AUTO: 0.7 % — SIGNIFICANT CHANGE UP (ref 0–2)
BILIRUB SERPL-MCNC: 0.4 MG/DL — SIGNIFICANT CHANGE UP (ref 0.2–1.2)
BUN SERPL-MCNC: 11 MG/DL — SIGNIFICANT CHANGE UP (ref 7–23)
CALCIUM SERPL-MCNC: 9.1 MG/DL — SIGNIFICANT CHANGE UP (ref 8.4–10.5)
CHLORIDE SERPL-SCNC: 104 MMOL/L — SIGNIFICANT CHANGE UP (ref 98–107)
CO2 SERPL-SCNC: 20 MMOL/L — LOW (ref 22–31)
CREAT SERPL-MCNC: 0.64 MG/DL — SIGNIFICANT CHANGE UP (ref 0.5–1.3)
CRP SERPL-MCNC: 15.5 MG/L — HIGH
EGFR: 109 ML/MIN/1.73M2 — SIGNIFICANT CHANGE UP
EOSINOPHIL # BLD AUTO: 0.06 K/UL — SIGNIFICANT CHANGE UP (ref 0–0.5)
EOSINOPHIL NFR BLD AUTO: 1.4 % — SIGNIFICANT CHANGE UP (ref 0–6)
ERYTHROCYTE [SEDIMENTATION RATE] IN BLOOD: 26 MM/HR — HIGH (ref 4–25)
GLUCOSE SERPL-MCNC: 90 MG/DL — SIGNIFICANT CHANGE UP (ref 70–99)
HCT VFR BLD CALC: 34.5 % — SIGNIFICANT CHANGE UP (ref 34.5–45)
HGB BLD-MCNC: 10.7 G/DL — LOW (ref 11.5–15.5)
IANC: 2.56 K/UL — SIGNIFICANT CHANGE UP (ref 1.8–7.4)
IMM GRANULOCYTES NFR BLD AUTO: 0.2 % — SIGNIFICANT CHANGE UP (ref 0–0.9)
LYMPHOCYTES # BLD AUTO: 1.18 K/UL — SIGNIFICANT CHANGE UP (ref 1–3.3)
LYMPHOCYTES # BLD AUTO: 27.6 % — SIGNIFICANT CHANGE UP (ref 13–44)
MCHC RBC-ENTMCNC: 30.8 PG — SIGNIFICANT CHANGE UP (ref 27–34)
MCHC RBC-ENTMCNC: 31 GM/DL — LOW (ref 32–36)
MCV RBC AUTO: 99.4 FL — SIGNIFICANT CHANGE UP (ref 80–100)
MONOCYTES # BLD AUTO: 0.43 K/UL — SIGNIFICANT CHANGE UP (ref 0–0.9)
MONOCYTES NFR BLD AUTO: 10.1 % — SIGNIFICANT CHANGE UP (ref 2–14)
NEUTROPHILS # BLD AUTO: 2.56 K/UL — SIGNIFICANT CHANGE UP (ref 1.8–7.4)
NEUTROPHILS NFR BLD AUTO: 60 % — SIGNIFICANT CHANGE UP (ref 43–77)
NRBC # BLD: 0 /100 WBCS — SIGNIFICANT CHANGE UP (ref 0–0)
NRBC # FLD: 0 K/UL — SIGNIFICANT CHANGE UP (ref 0–0)
PLATELET # BLD AUTO: 190 K/UL — SIGNIFICANT CHANGE UP (ref 150–400)
POTASSIUM SERPL-MCNC: 5 MMOL/L — SIGNIFICANT CHANGE UP (ref 3.5–5.3)
POTASSIUM SERPL-SCNC: 5 MMOL/L — SIGNIFICANT CHANGE UP (ref 3.5–5.3)
PROT SERPL-MCNC: 7.4 G/DL — SIGNIFICANT CHANGE UP (ref 6–8.3)
RBC # BLD: 3.47 M/UL — LOW (ref 3.8–5.2)
RBC # FLD: 13 % — SIGNIFICANT CHANGE UP (ref 10.3–14.5)
SODIUM SERPL-SCNC: 136 MMOL/L — SIGNIFICANT CHANGE UP (ref 135–145)
WBC # BLD: 4.27 K/UL — SIGNIFICANT CHANGE UP (ref 3.8–10.5)
WBC # FLD AUTO: 4.27 K/UL — SIGNIFICANT CHANGE UP (ref 3.8–10.5)

## 2023-05-02 PROCEDURE — 70496 CT ANGIOGRAPHY HEAD: CPT | Mod: 26,MA

## 2023-05-02 PROCEDURE — 93010 ELECTROCARDIOGRAM REPORT: CPT

## 2023-05-02 PROCEDURE — 99215 OFFICE O/P EST HI 40 MIN: CPT

## 2023-05-02 PROCEDURE — 70498 CT ANGIOGRAPHY NECK: CPT | Mod: 26,MA

## 2023-05-02 PROCEDURE — 99285 EMERGENCY DEPT VISIT HI MDM: CPT

## 2023-05-02 NOTE — HISTORY OF PRESENT ILLNESS
[de-identified] : Ms. Flowers is a [place] female who on June 2, 2017 saw her gynecologist, Dr. Corrigan, as she had menses twice in the same month and subsequently felt tired. She was then referred for routine screening mammogram, which was performed on June 5, 2017 with the finding of a right breast mass with associated architectural distortion suspicious for malignancy with ultrasound-guided core biopsy recommended. The patient also consequently had a bilateral breast ultrasound on the same date with a finding of a hypoechoic mass with irregular margins and posterior shadowing at the 1 o'clock axis of the right breast corresponding to the findings on the mammogram with no further suspicious findings; ultrasound-guided core biopsy was recommended. She ultimately had an ultrasound-guided core biopsy performed on June 12, 2017 with a finding of invasive moderately differentiated duct carcinoma, with the largest focus of invasive carcinoma measuring up to 5 mm with evidence of DCIS, intermediate nuclear grade, solid and cribriform types, with no evidence of lymphovascular invasion, ER positive (77%), MT positive (83%), and HER-2/carmen (1+) and negative. The patient then went on to have a bilateral breast MRI with the right breast showing, within the upper inner quadrant, a spiculated enhancing mass corresponding to the biopsy-proven cancer measuring up to 2 cm in the mediolateral direction, and 1.7 cm in the anterior-posterior direction, and 1.5 cm in the craniocaudal direction; left breast showed, within the upper outer quadrant, a somewhat serpiginous area of enhancement with the more anterior aspect becoming more nodular and measuring up to 1.1 cm in size, with additional scattered nonspecific enhancing foci; the axillae were unremarkable. 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 The patient subsequently was seen with complaint of pain and burning sensation and a new "lump" at the left breast biopsy site with a comment from the radiologist of a small hematoma with surrounding ecchymosis present on July 22, 2017, with instructions for warm compresses and pressure. The patient ultimately saw Dr. Caroline Benítez and underwent a bilateral lumpectomy and reduction mammoplasty, which was performed on August 11, 2017, at [facility] with a finding in the right breast of an invasive ductal carcinoma, moderately differentiated, measuring 2 cm in greatest diameter, nuclear, [employer] score 7/9, with evidence of DCIS and LCIS present in addition to fibrocystic changes with margins negative for carcinoma, with 0/3 sentinel lymph nodes involved with carcinoma; the left breast excisional biopsy showed evidence of ductal carcinoma in situ with microinvasion, with DCIS being of high nuclear grade, solid and comedo type with central necrosis, evidence of lobular carcinoma in situ, with margins of resection negative for DCIS and microinvasion. The patient returned to the operating room on October 13, 2017 for a left sentinel lymph node biopsy with a finding of 0/3 left sentinel lymph nodes involved with carcinoma. She subsequently had Oncotype DX testing sent off on her right breast carcinoma with a finding of a recurrence score of 7, correlating to an 8% risk of distant recurrence within 10 years should she take tamoxifen alone. The patient saw a medical oncologist, Dr. Maldonado, at [facility] who recommended adjuvant chemotherapy with CMF. She also had BRCA testing sent off, specifically a BRCA 1/2 [employer] 3-site mutation panel, which returned negative of note.\par \par She saw me in initial consultation in 11/17 and I recommended that she proceed with further genetic predisposition testing which returned negative for other known genetic predisposition mutations. Started Tamoxifen on October 24th 2017. She completed RT at [facility] on 2/13/18. \par \par Disease: breast cancer  \par Pathology: right breast IDC; left breast DCIS \par TNM stage: T1, N0, M0 \par AJCC Stage: 1 \par \par In the past she had c/o increasing arthralgias and joint swelling. She followed up with her PCP and was found to have Lyme IgG Ab P23 s/p doxycycline 100mg BID x 21 days in the beginning of June 2019.  She was prescribed another 21 days but refused to take it due to side effects.  Since then, she reported improvement in her forgetfulness, lightheadedness, fatigue, hair thinning.  Fevers and night sweats resolved. Thyroid workup neg, mildly elevated TSH.  \par \par She was last seen in 7/19. In the interim she c/o gradually worsening arthralgias and body aches especially since 11/19. She saw a new PCP - Dr Grimm who then referred her for an ID consult, Dr Kaba, and had no serologic evidence of Lyme Dz, or other active infections. She then had a rheum consult with Dr. Charles, who assessed:\par \par "44 year old female presents with polyarticular joint pains and myalgias of unclear etiology. Some of her symptoms, including the distribution of joints (MCP's and PIP's), joint swelling, elevated CRP, and family history, are suggestive of rheumatoid arthritis, though other symptoms, including pain outside of the joints and lack of prolonged AM stiffness, are atypical. I have therefore ordered some more bloodwork and x-rays as further workup. She will follow up with me again in 2 weeks to review her results. In the meantime, I have also started her on a trial of low-dose prednisone."\par \par Pt reports prednisone did not lead to any improvement so she d/c'd it.  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 DDx also includes AE's from tamoxifen, though less likely.\par  - Rx naproxen 500mg BID.\par  - x-rays of L-spine, S-I joints.\par \par 44 year old female with polyarticular joint pains and low back pain and found to have (+)HLA-B27. 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 DDx also includes AE's from tamoxifen, though less likely.\par  - Rx naproxen 500mg BID.\par  - x-rays of L-spine, S-I joints.\par \par She ultimately was seen by Dr Charles (rheum). Found  CRP elevated, diffuse pain, undifferentiated spondyloarthropathy, inflammatory arthritis, muscle cramps, polyarticular joint pains and low back pain and found to have (+)HLA-B27. Presentation suggestive of undifferentiated spondyloarthropathy vs seronegative RA. MRI L-spine revealed degenerative changes / no evidence of spondylitis. DDx also included AE's from tamoxifen, though less likely. Symptoms had been improving on sulfasalazine, but then lost effect. Pt was recommended to start methotrexate. \par \par  \par  [de-identified] : Seen today for a f/u visit.. \par \par She cont on tamoxifen and denies an treatment related side effects. \par \par She c/o persistent intermittent, arthralgias, low back pain, muscles aches and generalized fatigue. She remains very frustrated at not feeling better from her autoimmune disorder(s). \par \par She describes infreq positional vertigo. No change over time.\par \par Despite her joint pain an d swelling she manages to walk approx 3 mi / day and notes over 6k steps daily. \par \par Approx 2 mo ago reports transient loss of vision in her L eye - "a small slit of vision was left" - lasting approx 3 minutes, then fully resolved.   Has ongoing eye sensitivity to light.\par \par She c/o prior R rotator cuff repair in 5/22 with persistent and increasing pain P shoulder afterwards - saw another orthopod and MRI revd per pt with the finding of "tear in the biceps tendon" per pt.  Got IA steroids with good relief. \par \par 6/22 mammo/sono neg\par 2/23 MRI neg / benign findings.

## 2023-05-02 NOTE — ED PROVIDER NOTE - TOBACCO USE
Phone call to follow up regarding her breast pain and apt with Dr. Christal Yun. She is to keep her follow up as scheduled for June and call if symptoms worsen or new symptoms develop. Eddy Wade, RN, MSN, APRN-CNP, [id number]  Advanced Oncology Certified Nurse Practitioner  Department of Breast Surgery  [facility]/  [facility] in collaboration with Dr. Booker Fiore.  Tommie/Shantal Serna Never smoker

## 2023-05-02 NOTE — REVIEW OF SYSTEMS
[Negative] : Endocrine [Fever] : no fever [Chills] : no chills [Night Sweats] : no night sweats [Recent Change In Weight] : ~T no recent weight change [Abdominal Pain] : no abdominal pain [Vomiting] : no vomiting [Constipation] : no constipation [Diarrhea] : no diarrhea [FreeTextEntry2] : as above [FreeTextEntry3] : as above [FreeTextEntry7] : as above [FreeTextEntry9] : as above

## 2023-05-02 NOTE — ED ADULT NURSE NOTE - OBJECTIVE STATEMENT
A&Ox4. ambulatory. sent by PCP for light sensitivity, ,blurry vision, loss of vision 2 months ago  and , dizziness. NAD. pt denies SOB, chest pain, weakness, urinary symptoms, HA, n/v/d, fevers, chills. respirations are even and un labored. skin intact. 20g placed to LAC. labs drawn and sent. safety precautions maintained.

## 2023-05-02 NOTE — ED PROVIDER NOTE - CLINICAL SUMMARY MEDICAL DECISION MAKING FREE TEXT BOX
47-year-old female with history of breast cancer, hypertension presenting with chief complaint of transient left-sided headache and vision changes, now resolved.  Given history, patient was sent in for ER work-up.  Patient neuro intact with intact visual acuity on my exam.   completely asymptomatic at this time.  Spoke to patient's oncologist who was not aware that patient was in the ER, patient states that she was sent in by the ophthalmologist.  Has an appointment with outpatient ophthalmology tomorrow, does not have neurology follow-up.  Will obtain basic labs including ESR CRP while GCA/temporal arteritis  less likely given no symptoms at this time.  Will obtain CT angio of the head and neck to   For intracranial cause.  Reassess to dispo.  Likely outpatient follow-up with neurology and ophthalmology.

## 2023-05-02 NOTE — ED PROVIDER NOTE - OBJECTIVE STATEMENT
47-year-old female with past medical history of hypertension, breast cancer currently on tamoxifen and Rasuvo presenting with chief complaint of transient vision loss in the left eye that occurred 3 weeks ago.  Patient states that her symptoms are currently not present.  Was associated with mild left-sided headache.  States that she called both her oncologist and her rheumatologist, was referred to ophthalmologist who gave her an appointment tomorrow but recommend that she come to the ER for further evaluation.  Patient has no complaints at this time.

## 2023-05-02 NOTE — ED PROVIDER NOTE - PROGRESS NOTE DETAILS
Dinesh: pt signed out to me a 4 pm. CT with signs consistent with pseudotumor cerebri. Pt denies current HA or visual changes. She has follow up with ophthalmology tomorrow. Also rec neuro follow up. Discussed return recs. DC

## 2023-05-02 NOTE — PHYSICAL EXAM
[Fully active, able to carry on all pre-disease performance without restriction] : Status 0 - Fully active, able to carry on all pre-disease performance without restriction [Normal] : affect appropriate [de-identified] : R s/p LE with well healed scar, w/o nipple retraction skin dimpling or palp masses. L s/p LE with well healed scar, no nipple retraction skin dimpling or palp masses. B/L ax neg

## 2023-05-02 NOTE — ED PROVIDER NOTE - PATIENT PORTAL LINK FT
You can access the FollowMyHealth Patient Portal offered by [facility] by registering at the following website: [email or web address]. By joining [employer]’s FollowMyHealth portal, you will also be able to view your health information using other applications (apps) compatible with our system.

## 2023-05-02 NOTE — ED PROVIDER NOTE - NSFOLLOWUPINSTRUCTIONS_ED_ALL_ED_FT
follow up with your ophthalmologist tomorrow.  follow up with neurology    Idiopathic Intracranial Hypertension    Idiopathic intracranial hypertension (IIH) is a condition that increases pressure around the brain. The fluid that surrounds the brain and spinal cord (cerebrospinal fluid, or CSF) increases and causes the pressure. Idiopathic means that the cause of this condition is not known.    IIH affects the brain and spinal cord (neurological disorder). If this condition is not treated, it can cause vision loss or blindness.    What are the causes?  The cause of this condition is not known.    What increases the risk?  The following factors may make you more likely to develop this condition:  Being very overweight (obese).  Being a female between the ages of 20 and 50 years old, who has not gone through menopause.  Taking certain medicines, such as birth control or steroids.  What are the signs or symptoms?  Symptoms of this condition include:  Headaches. This is the most common symptom.  Brief episodes of total blindness.  Double vision, blurred vision, or poor side (peripheral) vision.  Pain in the shoulders or neck.  Nausea and vomiting.  A sound like rushing water or a pulsing sound within the ears (pulsatile tinnitus), or ringing in the ears.  How is this diagnosed?  This condition may be diagnosed based on:  Your symptoms and medical history.  Imaging tests of the brain, such as:  CT scan.  MRI.  Magnetic resonance venogram (MRV) to check the veins.  Diagnostic lumbar puncture. This is a procedure to remove and examine a sample of cerebrospinal fluid. This procedure can determine whether too much fluid may be causing IIH.  A thorough eye exam to check for swelling or nerve damage in the eyes.  How is this treated?  Treatment for this condition depends on the symptoms. The goal of treatment is to decrease the pressure around your brain. Common treatments include:  Weight loss through healthy eating, salt restriction, and exercise, if you are overweight.  Medicines to decrease the production of spinal fluid and lower the pressure within your skull.  Medicines to prevent or treat headaches.  Other treatments may include:  Surgery to place drains (shunts) in your brain for removing excess fluid.  Lumbar puncture to remove excess cerebrospinal fluid.  Follow these instructions at home:  If you are overweight or obese, work with your health care provider to lose weight.  Take over-the-counter and prescription medicines only as told by your health care provider.  Ask your health care provider if the medicine prescribed to you requires you to avoid driving or using machinery.  Do not use any products that contain nicotine or tobacco, such as cigarettes, e-cigarettes, and chewing tobacco. If you need help quitting, ask your health care provider.  Keep all follow-up visits as told by your health care provider. This is important.  Contact a health care provider if:  You have changes in your vision, such as:  Double vision.  Blurred vision.  Poor peripheral vision.  Get help right away if:  You have any of the following symptoms and they get worse or do not get better:  Headaches.  Nausea.  Vomiting.  Sudden trouble seeing.  Summary  Idiopathic intracranial hypertension (IIH) is a condition that increases pressure around the brain. The cause is not known (is idiopathic).  The most common symptom of IIH is headaches. Vision changes, pain in the shoulders or neck, nausea, and vomiting may also occur.  Treatment for this condition depends on your symptoms. The goal of treatment is to decrease the pressure around your brain.  If you are overweight or obese, work with your health care provider to lose weight.  Take over-the-counter and prescription medicines only as told by your health care provider.  This information is not intended to replace advice given to you by your health care provider. Make sure you discuss any questions you have with your health care provider.

## 2023-05-02 NOTE — ED ADULT NURSE NOTE - CHIEF COMPLAINT QUOTE
Pt sent by PCP with sensitivity to light L eye ,blurry vision, loss of vision 2 mths ago  and , dizziness..  Denies chest pain, headache

## 2023-05-06 ENCOUNTER — APPOINTMENT (OUTPATIENT)
Dept: OPHTHALMOLOGY | Facility: CLINIC | Age: 48
End: 2023-05-06

## 2023-05-09 ENCOUNTER — APPOINTMENT (OUTPATIENT)
Dept: PHYSICAL MEDICINE AND REHAB | Facility: CLINIC | Age: 48
End: 2023-05-09
Payer: MEDICAID

## 2023-05-09 VITALS
BODY MASS INDEX: 32.76 KG/M2 | WEIGHT: 178 LBS | DIASTOLIC BLOOD PRESSURE: 87 MMHG | HEIGHT: 62 IN | SYSTOLIC BLOOD PRESSURE: 141 MMHG

## 2023-05-09 PROCEDURE — 99214 OFFICE O/P EST MOD 30 MIN: CPT

## 2023-05-09 NOTE — ASSESSMENT
[FreeTextEntry1] : 47 year old female presenting for evaluation.\par \par #Postmastectomy pain syndrome:\par -Continue occupational therapy, breast rehabilitation program for myofascial release and manual therapy\par \par #Neuropathic Pain:\par -Continue gabapentin 300mg TID\par \par #At risk for lymphedema:\par -No no clinical signs of lymphedema on exam.\par -Patient has compression sleeve and gauntlet 20-30mmHg to be worn if develops symptoms, she was instructed to put on and call me immediately.\par -6 months surveillance with bioimpedance spectroscopy,  next surveillance in August 2023  \par \par Follow up in 3 months.  \par \par

## 2023-05-09 NOTE — HISTORY OF PRESENT ILLNESS
[FreeTextEntry1] : Ms. Flowers is a 47 year old female with history of bilateral breast cancer.  8/11/17 S/p bilat BCS with R SLN, bilat breast reduction, oncoplastic surgery and biozorb insertion on for RIGHT UIQ [id number] Infiltrating ductal Cancer, margins 1.2 cm, Gr 2. LN 1/4, ER/UT positive and Yqr4vso neg.  S/p L SLNBx due to microinvasion on final path 10/13/17.  Started tamoxifen 10/24/17.  Finished EBRT 2/18/18 Dr. Nogueira rad onc. \par \par She reports that she overall feels that her chest wall pain is improving.  Continue with occupational therapy.  Continues on gabapentin states is helpful denies any side effects.  Occasionally feels swelling that comes and goes but denies any today.  Denies any redness or erythema.

## 2023-05-12 ENCOUNTER — APPOINTMENT (OUTPATIENT)
Dept: OPHTHALMOLOGY | Facility: CLINIC | Age: 48
End: 2023-05-12

## 2023-05-15 ENCOUNTER — APPOINTMENT (OUTPATIENT)
Dept: OPHTHALMOLOGY | Facility: CLINIC | Age: 48
End: 2023-05-15
Payer: MEDICAID

## 2023-05-15 ENCOUNTER — NON-APPOINTMENT (OUTPATIENT)
Age: 48
End: 2023-05-15

## 2023-05-15 PROCEDURE — 92133 CPTRZD OPH DX IMG PST SGM ON: CPT

## 2023-05-15 PROCEDURE — 92004 COMPRE OPH EXAM NEW PT 1/>: CPT

## 2023-05-15 PROCEDURE — 92083 EXTENDED VISUAL FIELD XM: CPT

## 2023-05-18 ENCOUNTER — APPOINTMENT (OUTPATIENT)
Dept: RHEUMATOLOGY | Facility: CLINIC | Age: 48
End: 2023-05-18
Payer: MEDICAID

## 2023-05-18 VITALS
HEIGHT: 62 IN | HEART RATE: 98 BPM | SYSTOLIC BLOOD PRESSURE: 142 MMHG | DIASTOLIC BLOOD PRESSURE: 88 MMHG | OXYGEN SATURATION: 98 % | TEMPERATURE: 97.7 F

## 2023-05-18 PROCEDURE — 99215 OFFICE O/P EST HI 40 MIN: CPT

## 2023-05-18 RX ORDER — ZOSTER VACCINE RECOMBINANT, ADJUVANTED 50 MCG/0.5
50 KIT INTRAMUSCULAR
Qty: 1 | Refills: 1 | Status: ACTIVE | COMMUNITY
Start: 2023-05-18

## 2023-05-18 RX ORDER — DICLOFENAC SODIUM 75 MG/1
75 TABLET, DELAYED RELEASE ORAL
Refills: 0 | Status: DISCONTINUED | COMMUNITY
End: 2023-05-18

## 2023-05-18 NOTE — ASSESSMENT
[FreeTextEntry1] : 47 year old female with polyarticular joint pains and low back pain and found to have (+)HLA-B27 as well as diffuse enthesopathy - suggestive of undifferentiated spondyloarthropathy.  MRI L-spine reveals degenerative changes / no evidence of spondylitis.  MRI pelvis w/ no evidence of sacroiliitis.  Symptoms had been improving on sulfasalazine, but it then lost effect.  Had mild improvement on MTX, but did not tolerate it (abd pain/diarrhea). Had improved overall on MTX SC (Rasuvo) + sulfasalazine, but pt experiencing abd pain of unclear etiology - possibly due to SSZ.  Was then much improved on Rasuvo + leflunomide 20mg daily, but symptoms again began to recur.  Had been unable to use biologics until recently given recent hx of breast CA, though she has now been in remission for 5 years.  Had been improving on Enbrel + leflunomide, though currently again w/ joint pains.   Pt also recently found to have a full thickness tear of the supraspinatus in the right shoulder - now s/p surgery.\par   - Cont Cosentyx, leflunomide 20mg daily for now.\par   - Hepatitis panel negative 11/21.  Quantiferon negative 8/2022.\par   - Flu vaccine (10/22), Pneumovax (10/21), Prevnar (8/20) UTD.  Pt has received the COVID19 vaccine + booster  + bivalent booster.  Recommended Shingrix.\par   - Cont diclofenac 75mg BID prn for now.\par   - Reiterated importance of exercise.\par   - warm compresses\par   - OTC topical analgesics.\par   - Ortho f/u.

## 2023-05-18 NOTE — HISTORY OF PRESENT ILLNESS
[___ Month(s) Ago] : [unfilled] month(s) ago [Fatigue] : fatigue [Arthralgias] : arthralgias [Myalgias] : myalgias [FreeTextEntry1] : JoInt pains have improved somewhat on Cosentyx.  Still w/ severe right shoulder pain - recent MRI showed complete teat of the biceps tendon.  She saw another orthopedist, who performed  a C-S injection, after which her shoulder pain improved.  \par  [Anorexia] : no anorexia [Weight Loss] : no weight loss [Malaise] : no malaise [Fever] : no fever [Chills] : no chills [Malar Facial Rash] : no malar facial rash [Skin Lesions] : no lesions [Skin Nodules] : no skin nodules [Oral Ulcers] : no oral ulcers [Cough] : no cough [Dry Mouth] : no dry mouth [Dysphonia] : no dysphonia [Dysphagia] : no dysphagia [Shortness of Breath] : no shortness of breath [Chest Pain] : no chest pain [Joint Swelling] : no joint swelling [Joint Warmth] : no joint warmth [Joint Deformity] : no joint deformity [Decreased ROM] : no decreased range of motion [Morning Stiffness] : no morning stiffness [Falls] : no falls [Difficulty Standing] : no difficulty standing [Difficulty Walking] : no difficulty walking [Dyspnea] : no dyspnea [Muscle Weakness] : no muscle weakness [Muscle Spasms] : no muscle spasms [Muscle Cramping] : no muscle cramping [Visual Changes] : no visual changes [Eye Pain] : no eye pain [Eye Redness] : no eye redness [Dry Eyes] : no dry eyes

## 2023-05-18 NOTE — ASSESSMENT
[FreeTextEntry1] : 47 year old female with polyarticular joint pains and low back pain and found to have (+)HLA-B27 as well as diffuse enthesopathy - suggestive of undifferentiated spondyloarthropathy.  MRI L-spine reveals degenerative changes / no evidence of spondylitis.  MRI pelvis w/ no evidence of sacroiliitis.  Symptoms had been improving on sulfasalazine, but it then lost effect.  Had mild improvement on MTX, but did not tolerate it (abd pain/diarrhea). Had improved overall on MTX SC (Rasuvo) + sulfasalazine, but pt began experiencing abd pain of unclear etiology - possibly due to SSZ.  Was then much improved on Rasuvo + leflunomide 20mg daily, but symptoms again began to recur.  Had been unable to use biologics until recently given recent hx of breast CA, though she has now been in remission for >5 years.  Had been improving on Enbrel + leflunomide, though the effect dsoon wore off.  Now improved on Cosentyx + leflunomide   Pt also recently found to have a full thickness tear of the supraspinatus in the right shoulder - now s/p surgery, though recent MRI revealed a re-tear.  Also w/ sevewre B/L knee pain (R>L) of unclear etiology - x-rays unremarkable.\par   - Cont Cosentyx, leflunomide 20mg daily for now.\par   - Hepatitis panel negative 11/21.  Quantiferon negative 8/2022.\par   - Flu vaccine (10/22), Pneumovax (10/21), Prevnar (8/20) UTD.  Pt has received the COVID19 vaccine + booster  + bivalent booster.  Recommended Shingrix.\par   - Cont diclofenac 75mg BID prn for now.\par   - Reiterated importance of exercise.\par   - warm compresses\par   - OTC topical analgesics.\par   - Ortho f/u.\par   - MRI right knee

## 2023-05-18 NOTE — PHYSICAL EXAM
[General Appearance - In No Acute Distress] : in no acute distress [General Appearance - Alert] : alert [Sclera] : the sclera and conjunctiva were normal [Outer Ear] : the ears and nose were normal in appearance [Oropharynx] : the oropharynx was normal [Neck Appearance] : the appearance of the neck was normal [Jugular Venous Distention Increased] : there was no jugular-venous distention [Neck Cervical Mass (___cm)] : no neck mass was observed [Thyroid Diffuse Enlargement] : the thyroid was not enlarged [Thyroid Nodule] : there were no palpable thyroid nodules [Auscultation Breath Sounds / Voice Sounds] : lungs were clear to auscultation bilaterally [Heart Sounds] : normal S1 and S2 [Heart Rate And Rhythm] : heart rate was normal and rhythm regular [Heart Sounds Gallop] : no gallops [Murmurs] : no murmurs [Heart Sounds Pericardial Friction Rub] : no pericardial rub [Edema] : there was no peripheral edema [Bowel Sounds] : normal bowel sounds [Abdomen Soft] : soft [Abdomen Tenderness] : non-tender [Abdomen Mass (___ Cm)] : no abdominal mass palpated [Cervical Lymph Nodes Enlarged Posterior Bilaterally] : posterior cervical [Cervical Lymph Nodes Enlarged Anterior Bilaterally] : anterior cervical [Supraclavicular Lymph Nodes Enlarged Bilaterally] : supraclavicular [Skin Color & Pigmentation] : normal skin color and pigmentation [Skin Turgor] : normal skin turgor [] : no rash [No Focal Deficits] : no focal deficits [Oriented To Time, Place, And Person] : oriented to person, place, and time [Impaired Insight] : insight and judgment were intact [Affect] : the affect was normal [FreeTextEntry1] : no active synovitis; B/L shoulders w/ pain upon movement in all planes (R>L); B/L knees w/ pain upon flexion/extension;  normal ROM in rest of joints

## 2023-06-05 ENCOUNTER — NON-APPOINTMENT (OUTPATIENT)
Age: 48
End: 2023-06-05

## 2023-06-06 ENCOUNTER — NON-APPOINTMENT (OUTPATIENT)
Age: 48
End: 2023-06-06

## 2023-06-14 RX ORDER — PREDNISONE 10 MG/1
10 TABLET ORAL
Qty: 30 | Refills: 1 | Status: ACTIVE | COMMUNITY
Start: 2023-06-14 | End: 1900-01-01

## 2023-06-26 ENCOUNTER — NON-APPOINTMENT (OUTPATIENT)
Age: 48
End: 2023-06-26

## 2023-06-26 ENCOUNTER — APPOINTMENT (OUTPATIENT)
Dept: OPHTHALMOLOGY | Facility: CLINIC | Age: 48
End: 2023-06-26
Payer: MEDICAID

## 2023-06-26 PROCEDURE — 92014 COMPRE OPH EXAM EST PT 1/>: CPT

## 2023-06-29 ENCOUNTER — APPOINTMENT (OUTPATIENT)
Dept: RHEUMATOLOGY | Facility: CLINIC | Age: 48
End: 2023-06-29
Payer: MEDICAID

## 2023-06-29 ENCOUNTER — APPOINTMENT (OUTPATIENT)
Dept: PHYSICAL MEDICINE AND REHAB | Facility: CLINIC | Age: 48
End: 2023-06-29
Payer: MEDICAID

## 2023-06-29 VITALS
HEIGHT: 62 IN | OXYGEN SATURATION: 99 % | HEART RATE: 82 BPM | DIASTOLIC BLOOD PRESSURE: 78 MMHG | BODY MASS INDEX: 32.56 KG/M2 | SYSTOLIC BLOOD PRESSURE: 128 MMHG | TEMPERATURE: 97.6 F

## 2023-06-29 VITALS
HEART RATE: 80 BPM | SYSTOLIC BLOOD PRESSURE: 130 MMHG | WEIGHT: 178 LBS | BODY MASS INDEX: 32.76 KG/M2 | HEIGHT: 62 IN | DIASTOLIC BLOOD PRESSURE: 87 MMHG | RESPIRATION RATE: 14 BRPM

## 2023-06-29 PROCEDURE — 99215 OFFICE O/P EST HI 40 MIN: CPT

## 2023-06-29 PROCEDURE — 99213 OFFICE O/P EST LOW 20 MIN: CPT

## 2023-06-29 NOTE — HISTORY OF PRESENT ILLNESS
[___ Month(s) Ago] : [unfilled] month(s) ago [Fatigue] : fatigue [Arthralgias] : arthralgias [Myalgias] : myalgias [FreeTextEntry1] : Pt c/o severe LBP, worse since last visit.  She also c/o severe pain in her B/L feet/ankles - primarily w/ walking / minimal pain at rest.  No significant improvement w/ prednisone.  B/L knee pain has improved s/p C-S injections by orthopedics 2 days ago.   [Anorexia] : no anorexia [Weight Loss] : no weight loss [Malaise] : no malaise [Fever] : no fever [Chills] : no chills [Malar Facial Rash] : no malar facial rash [Skin Lesions] : no lesions [Skin Nodules] : no skin nodules [Oral Ulcers] : no oral ulcers [Cough] : no cough [Dry Mouth] : no dry mouth [Dysphonia] : no dysphonia [Dysphagia] : no dysphagia [Shortness of Breath] : no shortness of breath [Chest Pain] : no chest pain [Joint Swelling] : no joint swelling [Joint Warmth] : no joint warmth [Joint Deformity] : no joint deformity [Decreased ROM] : no decreased range of motion [Morning Stiffness] : no morning stiffness [Falls] : no falls [Difficulty Standing] : no difficulty standing [Difficulty Walking] : no difficulty walking [Dyspnea] : no dyspnea [Muscle Weakness] : no muscle weakness [Muscle Spasms] : no muscle spasms [Muscle Cramping] : no muscle cramping [Visual Changes] : no visual changes [Eye Pain] : no eye pain [Eye Redness] : no eye redness [Dry Eyes] : no dry eyes

## 2023-06-29 NOTE — ASSESSMENT
[FreeTextEntry1] : 47 year old female with polyarticular joint pains and low back pain and found to have (+)HLA-B27 as well as diffuse enthesopathy - suggestive of undifferentiated spondyloarthropathy.  MRI L-spine reveals degenerative changes / no evidence of spondylitis.  MRI pelvis w/ no evidence of sacroiliitis.  Symptoms had been improving on sulfasalazine, but it then lost effect.  Had mild improvement on MTX, but did not tolerate it (abd pain/diarrhea). Had improved overall on MTX SC (Rasuvo) + sulfasalazine, but pt began experiencing abd pain of unclear etiology - possibly due to SSZ.  Was then much improved on Rasuvo + leflunomide 20mg daily, but symptoms again began to recur.  Had been unable to use biologics until recently given recent hx of breast CA, though she has now been in remission for >5 years.  Had been improving on Enbrel + leflunomide, though the effect soon wore off.  Now improved on Cosentyx + leflunomide, though still w/ joint pains.  Pt also recently found to have a full thickness tear of the supraspinatus in the right shoulder - now s/p surgery, though recent MRI revealed a re-tear.  Also w/ severe B/L knee pain (R>L) - MRI revealed a medial meniscal tear.  LBP currently worse. Also now w/ severe B/L ankles/foot pain - appears non-inflammatory / no significant improvement w/ prednisone.\par   - Increase Cosentyx to 300mg qmonthly.  Cont leflunomide 20mg daily for now.\par   - Hepatitis panel negative 11/21.  Quantiferon negative 8/2022.\par   - Flu vaccine (10/22), Pneumovax (10/21), Prevnar (8/20) UTD.  Pt has received the COVID19 vaccine + booster  + bivalent booster.  Recommended Shingrix.\par   - Check labs. \par   - D/C diclofenac, start Celebrex 100mg daily prn.  Can increase to 200mg daily prn as tolerated..\par   - Reiterated importance of exercise.\par   - warm compresses\par   - OTC topical analgesics.\par   - Ortho f/u.\par   - x-rays B/L feet / ankles.

## 2023-06-29 NOTE — ASSESSMENT
[FreeTextEntry1] : 47 year old female presenting for evaluation.\par \par #At risk for lymphedema:\par -She was concerned about acute exacerbation of edema in LUE, suspect likely due to steroid use.  Has since resolved.\par -No clinical signs of lymphedema on exam.\par -Tape measurements with no significant difference.\par -Obtain US LUE to rule out DVT\par -Patient has compression sleeve and gauntlet 20-30mmHg to be worn if develops symptoms, she was instructed to put on and call me immediately.\par \par Close follow up in 1 month.  \par \par

## 2023-06-29 NOTE — PHYSICAL EXAM
[General Appearance - Alert] : alert [General Appearance - In No Acute Distress] : in no acute distress [Sclera] : the sclera and conjunctiva were normal [Outer Ear] : the ears and nose were normal in appearance [Oropharynx] : the oropharynx was normal [Neck Appearance] : the appearance of the neck was normal [Neck Cervical Mass (___cm)] : no neck mass was observed [Jugular Venous Distention Increased] : there was no jugular-venous distention [Thyroid Diffuse Enlargement] : the thyroid was not enlarged [Thyroid Nodule] : there were no palpable thyroid nodules [Auscultation Breath Sounds / Voice Sounds] : lungs were clear to auscultation bilaterally [Heart Rate And Rhythm] : heart rate was normal and rhythm regular [Heart Sounds] : normal S1 and S2 [Heart Sounds Gallop] : no gallops [Murmurs] : no murmurs [Heart Sounds Pericardial Friction Rub] : no pericardial rub [Edema] : there was no peripheral edema [Bowel Sounds] : normal bowel sounds [Abdomen Soft] : soft [Abdomen Tenderness] : non-tender [Abdomen Mass (___ Cm)] : no abdominal mass palpated [Cervical Lymph Nodes Enlarged Posterior Bilaterally] : posterior cervical [Cervical Lymph Nodes Enlarged Anterior Bilaterally] : anterior cervical [Supraclavicular Lymph Nodes Enlarged Bilaterally] : supraclavicular [Skin Color & Pigmentation] : normal skin color and pigmentation [Skin Turgor] : normal skin turgor [] : no rash [No Focal Deficits] : no focal deficits [Oriented To Time, Place, And Person] : oriented to person, place, and time [Impaired Insight] : insight and judgment were intact [Affect] : the affect was normal [FreeTextEntry1] : no active synovitis; B/L shoulders w/ pain upon movement in all planes (R>L); B/L knees w/ pain upon flexion/extension; B/L ankles w/ (+)tenderness, worst laterally;  normal ROM in rest of joints

## 2023-06-29 NOTE — PHYSICAL EXAM
[FreeTextEntry1] : Gen: Patient is A&O x 3, NAD\par HEENT: EOMI, hearing grossly normal\par Resp: regular, non - labored\par CV: pulses regular\par Skin: no rashes, erythema\par Lymph: no clubbing, cyanosis, edema in b/l UE\par Inspection: no instability or misalignment\par ROM: Right shoulder abduction ~165 degrees \par Palpation: TTP right pectoral muscles \par Sensation: intact to light touch\par Reflexes: 1+ and symmetric throughout\par Strength: 5/5 throughout\par Special tests: -Hawkin's sign bilateral \par Gait: normal, non-antalgic\par \par Measurements were taken of the bilateral upper extremities which were as follows:\par \par Left:\par 15 cm above the elbow: 37.5 cm \par 10 cm above the elbow: 31.5 cm \par 10 cm below the elbow: 20.5 cm \par 15 cm below the elbow: 18 cm \par wrist (at the ulnar styloid): 14.5 cm\par Hand: 18 cm\par base of 2nd digit: 6 cm\par \par Right:\par 15 cm above the elbow: 37.5 cm\par 10 cm above the elbow: 31 cm\par 10 cm below the elbow: 20.5 cm\par 15 cm below the elbow: 18 cm\par wrist (at the ulnar styloid): 15 cm\par Hand: 17.5 cm\par base of 2nd digit: 6 cm \par \par

## 2023-06-29 NOTE — HISTORY OF PRESENT ILLNESS
[FreeTextEntry1] : Ms. Flowers is a 47 year old female with history of bilateral breast cancer.  8/11/17 S/p bilat BCS with R SLN, bilat breast reduction, oncoplastic surgery and biozorb insertion on for RIGHT UIQ [id number] Infiltrating ductal Cancer, margins 1.2 cm, Gr 2. LN 1/4, ER/ME positive and Gdt1xgy neg.  S/p L SLNBx due to microinvasion on final path 10/13/17.  Started tamoxifen 10/24/17.  Finished EBRT 2/18/18 Dr. Nogueira rad onc. \par \par She reports that she last week had noticed swelling in her arms specifically the left in her bilateral feet.  She reports that she was started on a course of steroids for her acute arthralgias.  She reports that since this time the swelling has gone down and resolved.  Denies any redness erythema or increased warmth.

## 2023-07-07 ENCOUNTER — TRANSCRIPTION ENCOUNTER (OUTPATIENT)
Age: 48
End: 2023-07-07

## 2023-07-10 ENCOUNTER — APPOINTMENT (OUTPATIENT)
Dept: BREAST CENTER | Facility: CLINIC | Age: 48
End: 2023-07-10
Payer: MEDICAID

## 2023-07-10 ENCOUNTER — APPOINTMENT (OUTPATIENT)
Dept: OPHTHALMOLOGY | Facility: CLINIC | Age: 48
End: 2023-07-10

## 2023-07-10 DIAGNOSIS — N64.4 MASTODYNIA: ICD-10-CM

## 2023-07-10 DIAGNOSIS — R92.2 INCONCLUSIVE MAMMOGRAM: ICD-10-CM

## 2023-07-10 DIAGNOSIS — M25.572 PAIN IN LEFT ANKLE AND JOINTS OF LEFT FOOT: ICD-10-CM

## 2023-07-10 DIAGNOSIS — Z80.3 FAMILY HISTORY OF MALIGNANT NEOPLASM OF BREAST: ICD-10-CM

## 2023-07-10 DIAGNOSIS — M79.672 PAIN IN LEFT FOOT: ICD-10-CM

## 2023-07-10 DIAGNOSIS — Z85.3 PERSONAL HISTORY OF MALIGNANT NEOPLASM OF BREAST: ICD-10-CM

## 2023-07-10 PROCEDURE — 99214 OFFICE O/P EST MOD 30 MIN: CPT

## 2023-07-10 NOTE — PHYSICAL EXAM
[Normocephalic] : normocephalic [Atraumatic] : atraumatic [Supple] : supple [No Supraclavicular Adenopathy] : no supraclavicular adenopathy [No Thyromegaly] : no thyromegaly [Examined in the supine and seated position] : examined in the supine and seated position [No dominant masses] : no dominant masses in right breast  [No dominant masses] : no dominant masses left breast [No Nipple Retraction] : no left nipple retraction [No Nipple Discharge] : no left nipple discharge [No Axillary Lymphadenopathy] : no left axillary lymphadenopathy [No Edema] : no edema [No Swelling] : no swelling [No Rashes] : no rashes [No Ulceration] : no ulceration [de-identified] : \par CBE: Bilat reduction scars. Excellent cosmesis. Bilat lumpy bumpy breasts. No adenopathy. CORIE\par ROM limited at shoulders - R>L,  [de-identified] : Recent Rotator cuff surgery

## 2023-07-10 NOTE — ASSESSMENT
[FreeTextEntry1] : 46 y/o BRCA neg female here for f.u for CBE and review recent MRI. She has a hx of bilat BCT, pt is almost 6 years out. Pt denies any breast lesions, discharge or masses.\par 8/11/17 S/p bilat BCS with R SLN, bilat breast reduction, oncoplastic surgery and biozorb insertion on for RIGHT UIQ [id number] Infiltrating ductal Cancer, margins 1.2 cm, Gr 2. LN 1/4, ER/OK positive and Acw4vwr neg. Oncotype was 7. LEFT was pT1mic, N0, Margins neg at 5 mm. ER+/OK neg and Her2 neg. s/p L SLNBx due to microinvasion on final path 10/13/17. \par \par Here today for follow up. She continues to have intermittent breast pain, decreased range of motion, history of shoulder surgery. \par Sees Dr. Dugan for PMR, on gabapentin 300mg TID.\par Medical oncologist: Wade Alejo, Started tamoxifen 10/2017. She continues to cycle monthly\par Finished EBRT 2/18/18 Dr. Nogueira rad onc. Discharged plastic surgery Dr. Gatica.\par Sees Rheum - Dr. Charles. On Cosentyx.\par Had R shoulder arthroscopy 5/22 w/ Dr. Ti Coombs, still continues to have pain. \par \par 2/9/23 ZP, MRI: Het FG tissue. Scattered enhancing nonspecific foci bilaterally. No susp enhancing lesions bilaterally. Postsurg changes noted bilaterally. No sign lymphadenopathy. No MRI evidence of malignancy. BR2\par \par 7/3/23 ZP, sMMG: Het dense. Stable postlumpectomy changes bilaterally. Stable benign bilat calcifications. No susp masses or clustered mircocalcs. No significant interval change. Impression: no mmg evidence of malignancy. BR2\par 7/3/23 ZP, US: R- 1:00 4cmfn stable appearance of of lumpectomy scar. L- 1:00 9cmfn stable cystic changes within lumpectomy scar. RA stable cystic changes associated with the skin. BR2\par \par CBE: Bilat reduction scars. Excellent cosmesis. Bilat lumpy bumpy breasts. No adenopathy. CORIE\par ROM limited at shoulders - R>L, patient continues to have pain s/p surgery. No lymphedema present at this time, and sozo within normal limits this month.\par \par Reviewed MRI, no suspicious findings. Discussed with patient while she is 5 years out given age of diagnose would still recommend she continue with screening MRI. She is agreeable to plan. She would like to continue following up with us. She has had a lot of medical issues from her autoimmune ankylosing spondylitis. \par She wants to get a shingles vaccine and needs to get a letter from her rheumatologist. \par  MMG and US due 7/23, will follow up with myself or GYN at that time for CBE. Continue to follow with Dr. Dugan. Continue with PT. \par  vital signs

## 2023-07-10 NOTE — HISTORY OF PRESENT ILLNESS
[FreeTextEntry1] : 48 y/o BRCA neg female here for f.u for CBE and review recent MRI. She has a hx of bilat BCT, pt is almost 6 years out. Pt denies any breast lesions, discharge or masses.\par 8/11/17 S/p bilat BCS with R SLN, bilat breast reduction, oncoplastic surgery and biozorb insertion on for RIGHT UIQ [id number] Infiltrating ductal Cancer, margins 1.2 cm, Gr 2. LN 1/4, ER/NE positive and Dig2vum neg. Oncotype was 7. LEFT was pT1mic, N0, Margins neg at 5 mm. ER+/NE neg and Her2 neg. s/p L SLNBx due to microinvasion on final path 10/13/17. \par \par Here today for follow up. She continues to have intermittent breast pain, decreased range of motion, history of shoulder surgery. \par Sees Dr. Dugan for PMR, on gabapentin 300mg TID.\par Medical oncologist: Wade Alejo, Started tamoxifen 10/2017. She continues to cycle monthly\par Finished EBRT 2/18/18 Dr. Nogueira rad onc. Discharged plastic surgery Dr. Gatica.\par Sees Rheum - Dr. Charles. On Cosentyx.\par Had R shoulder arthroscopy 5/22 w/ Dr. Ti Coombs, still continues to have pain. \par \par 2/9/23 ZP, MRI: Het FG tissue. Scattered enhancing nonspecific foci bilaterally. No susp enhancing lesions bilaterally. Postsurg changes noted bilaterally. No sign lymphadenopathy. No MRI evidence of malignancy. BR2\par \par 7/3/23 ZP, sMMG: Het dense. Stable postlumpectomy changes bilaterally. Stable benign bilat calcifications. No susp masses or clustered mircocalcs. No significant interval change. Impression: no mmg evidence of malignancy. BR2\par 7/3/23 ZP, US: R- 1:00 4cmfn stable appearance of of lumpectomy scar. L- 1:00 9cmfn stable cystic changes within lumpectomy scar. RA stable cystic changes associated with the skin. BR2\par

## 2023-07-10 NOTE — REVIEW OF SYSTEMS
[Arthralgias] : arthralgias [Joint Swelling] : joint swelling [Joint Stiffness] : joint stiffness [Limb Pain] : limb pain [Negative] : Heme/Lymph

## 2023-07-10 NOTE — DATA REVIEWED
[FreeTextEntry1] : 7/3/23 ZP, sMMG: Het dense. Stable postlumpectomy changes bilaterally. Stable benign bilat calcifications. No susp masses or clustered mircocalcs. No significant interval change. Impression: no mmg evidence of malignancy. BR2\par \par 7/3/23 ZP, US: R- 1:00 4cmfn stable appearance of of lumpectomy scar. L- 1:00 9cmfn stable cystic changes within lumpectomy scar. RA stable cystic changes associated with the skin. BR2

## 2023-07-25 ENCOUNTER — APPOINTMENT (OUTPATIENT)
Dept: RHEUMATOLOGY | Facility: CLINIC | Age: 48
End: 2023-07-25
Payer: MEDICAID

## 2023-07-25 VITALS
TEMPERATURE: 97.5 F | OXYGEN SATURATION: 97 % | DIASTOLIC BLOOD PRESSURE: 70 MMHG | BODY MASS INDEX: 32.19 KG/M2 | HEART RATE: 79 BPM | WEIGHT: 176 LBS | SYSTOLIC BLOOD PRESSURE: 122 MMHG

## 2023-07-25 DIAGNOSIS — M54.50 LOW BACK PAIN, UNSPECIFIED: ICD-10-CM

## 2023-07-25 PROCEDURE — 99214 OFFICE O/P EST MOD 30 MIN: CPT

## 2023-08-08 ENCOUNTER — APPOINTMENT (OUTPATIENT)
Dept: PHYSICAL MEDICINE AND REHAB | Facility: CLINIC | Age: 48
End: 2023-08-08
Payer: MEDICAID

## 2023-08-08 PROCEDURE — 99214 OFFICE O/P EST MOD 30 MIN: CPT

## 2023-08-08 NOTE — PHYSICAL EXAM
[FreeTextEntry1] : Gen: Patient is A&O x 3, NAD HEENT: EOMI, hearing grossly normal. Resp: regular, non - labored CV: pulses regular Skin: no rashes, erythema Lymph: Subtle edema in left proximal upper extremity Inspection: no instability or misalignment ROM: Right shoulder abduction ~165 degrees  Palpation: TTP chest wall  Sensation: intact to light touch Reflexes: 1+ and symmetric throughout Strength: 5/5 throughout Special tests: -Hawkin's sign bilateral  Gait: normal, non-antalgic  Measurements were taken of the bilateral upper extremities which were as follows:  Left: 15 cm above the elbow: 37.5 cm (37.5 cm)  10 cm above the elbow: 34 cm (31.5 cm)  10 cm below the elbow: 21.1 cm (20.5 cm)  15 cm below the elbow: 18.2 cm (18 cm)  wrist (at the ulnar styloid): 14. 7 cm (14.5 cm) Hand: 18 cm (18 cm) base of 2nd digit: 5.7 cm (6 cm)  Right: 15 cm above the elbow: 37.5 cm (37.5 cm) 10 cm above the elbow: 32 cm (31 cm) 10 cm below the elbow: 21. 5 cm (20.5 cm) 15 cm below the elbow: 17. 5 cm (18 cm) wrist (at the ulnar styloid): 15 cm (15 cm) Hand: 18 cm (17.5 cm) base of 2nd digit: 5.9 cm (6 cm)

## 2023-08-08 NOTE — ASSESSMENT
[FreeTextEntry1] : 47 year old female presenting for evaluation  #Lymphedema of left upper extremity  -US reviewed, negative for DVT -Tape measurements done today with an increase in measurements proximally on the left upper extremity -Discussed options of compression vs CDT, she would like to trial compression first  -Start compression sleeve and gauntlet 20-30mmHg  -If no improvement then will start therapy for manual lymphatic drainage/CDT -Continue self-manual lymphatic drainage  #Neuropathic Pain: -Controlled but interested in decreasing dose due to concern for brain fog  -Decrease to gabapentin 300 mg HS   #Postmastectomy pain syndrome: -Improved -Completed occupational therapy, breast rehabilitation program for myofascial release and manual therapy -Continue home exercises  -Close follow up in 1 month.

## 2023-08-08 NOTE — HISTORY OF PRESENT ILLNESS
[FreeTextEntry1] : Ms. Flowers is a 47 year old female with history of bilateral breast cancer.  8/11/17 S/p bilat BCS with R SLN, bilat breast reduction, oncoplastic surgery and biozorb insertion on for RIGHT UIQ [id number] Infiltrating ductal Cancer, margins 1.2 cm, Gr 2. LN 1/4, ER/KS positive and [id number] neg.  S/p L SLNBx due to microinvasion on final path 10/13/17.  Started tamoxifen 10/24/17.  Finished EBRT 2/18/18 Dr. Nogueira rad onc.   She feels like her left upper extremity swelling and heaviness worsened the past 2 weeks. She wears LUE compressive garment sparingly and only wore it once since the last visit. She does self-lymphatic manual drainage every day. Denies any redness erythema or increased warmth.  She takes Gabapentin and it helps the pain. She is worried that it is causing her brain fog so interested in adjusting the dose.

## 2023-08-08 NOTE — END OF VISIT
[] : Fellow [FreeTextEntry3] : I have personally seen and examined the patient. I fully participated in the care of this patient. I have made amendments to the documentation where necessary, and agree with the history, physical exam, and plan as documented by the Fellow, Dr. Benítez.

## 2023-08-29 ENCOUNTER — APPOINTMENT (OUTPATIENT)
Dept: RHEUMATOLOGY | Facility: CLINIC | Age: 48
End: 2023-08-29

## 2023-09-08 NOTE — ASSESSMENT
[FreeTextEntry1] : 47 year old female with polyarticular joint pains and low back pain and found to have (+)HLA-B27 as well as diffuse enthesopathy - suggestive of undifferentiated spondyloarthropathy.  MRI L-spine reveals degenerative changes / no evidence of spondylitis.  MRI pelvis w/ no evidence of sacroiliitis.  Symptoms had been improving on sulfasalazine, but it then lost effect.  Had mild improvement on MTX, but did not tolerate it (abd pain/diarrhea). Had improved overall on MTX SC (Rasuvo) + sulfasalazine, but pt began experiencing abd pain of unclear etiology - possibly due to SSZ.  Was then much improved on Rasuvo + leflunomide 20mg daily, but symptoms again began to recur.  Had been unable to use biologics until recently given recent hx of breast CA, though she has now been in remission for >5 years.  Had been improving on Enbrel + leflunomide, though the effect soon wore off.  Now much improved on Cosentyx + leflunomide.  Currently still w/ joint pains, though appear to be due to other etiologies.  Pt also recently found to have a full thickness tear of the supraspinatus in the right shoulder - now s/p surgery, though recent MRI revealed a re-tear.  Also w/ severe B/L knee pain (R>L) - MRI revealed a medial meniscal tear - improved s/p recent corticosteroid injections by orthopedics.  LBP currently worse - repeat MRI revealed OA changed.. Also now w/ severe B/L ankle/foot pain - appears non-inflammatory / no significant improvement w/ prednisone.  (Addendum 9/8/20223:  also w/ no improvement s/p 6 weeks of conservative treatment, including an exercise regimen and NSAID's). MRI left foot w/ mild chonfrolamalcia.   - Cont Cosentyx 300mg qmonthly, leflunomide 20mg daily.   - Hepatitis panel negative 11/21.  Quantiferon negative 8/2022.   - Flu vaccine (10/22), Pneumovax (10/21), Prevnar (8/20) UTD.  Pt has received the COVID19 vaccine + booster  + bivalent booster.  Previously recommended Shingrix.   - Cont Celebrex 100mg daily prn.  Can increase to 200mg daily prn as tolerated..   - Reiterated importance of exercise, wesly back and foot/ankle.   - warm compresses   - OTC topical analgesics.   - Ortho f/u.

## 2023-09-08 NOTE — HISTORY OF PRESENT ILLNESS
[___ Month(s) Ago] : [unfilled] month(s) ago [Fatigue] : fatigue [Arthralgias] : arthralgias [Myalgias] : myalgias [FreeTextEntry1] : Still w/ severe pain in her lower back, shoulders, and in her B/L feet/ankles.  B/L knee pain remains improved s/p recent C-S injections by orthopedics.   No new complaints, [Anorexia] : no anorexia [Weight Loss] : no weight loss [Malaise] : no malaise [Fever] : no fever [Chills] : no chills [Malar Facial Rash] : no malar facial rash [Skin Lesions] : no lesions [Skin Nodules] : no skin nodules [Oral Ulcers] : no oral ulcers [Cough] : no cough [Dry Mouth] : no dry mouth [Dysphonia] : no dysphonia [Dysphagia] : no dysphagia [Shortness of Breath] : no shortness of breath [Chest Pain] : no chest pain [Joint Swelling] : no joint swelling [Joint Warmth] : no joint warmth [Joint Deformity] : no joint deformity [Decreased ROM] : no decreased range of motion [Morning Stiffness] : no morning stiffness [Falls] : no falls [Difficulty Standing] : no difficulty standing [Difficulty Walking] : no difficulty walking [Dyspnea] : no dyspnea [Muscle Weakness] : no muscle weakness [Muscle Spasms] : no muscle spasms [Muscle Cramping] : no muscle cramping [Visual Changes] : no visual changes [Eye Pain] : no eye pain [Eye Redness] : no eye redness [Dry Eyes] : no dry eyes

## 2023-09-11 ENCOUNTER — APPOINTMENT (OUTPATIENT)
Dept: PHYSICAL MEDICINE AND REHAB | Facility: CLINIC | Age: 48
End: 2023-09-11
Payer: MEDICAID

## 2023-09-11 VITALS
HEART RATE: 79 BPM | SYSTOLIC BLOOD PRESSURE: 148 MMHG | DIASTOLIC BLOOD PRESSURE: 85 MMHG | RESPIRATION RATE: 14 BRPM | WEIGHT: 173 LBS | BODY MASS INDEX: 31.83 KG/M2 | HEIGHT: 62 IN

## 2023-09-11 PROCEDURE — 99214 OFFICE O/P EST MOD 30 MIN: CPT

## 2023-10-10 RX ORDER — SECUKINUMAB 150 MG/ML
150 INJECTION SUBCUTANEOUS
Qty: 2 | Refills: 11 | Status: ACTIVE | COMMUNITY
Start: 2023-01-19 | End: 1900-01-01

## 2023-10-27 ENCOUNTER — APPOINTMENT (OUTPATIENT)
Dept: CARDIOLOGY | Facility: CLINIC | Age: 48
End: 2023-10-27

## 2023-10-30 ENCOUNTER — OUTPATIENT (OUTPATIENT)
Dept: OUTPATIENT SERVICES | Facility: HOSPITAL | Age: 48
LOS: 1 days | Discharge: HOME | End: 2023-10-30

## 2023-10-30 ENCOUNTER — APPOINTMENT (OUTPATIENT)
Dept: CARDIOLOGY | Facility: CLINIC | Age: 48
End: 2023-10-30

## 2023-10-30 DIAGNOSIS — C50.919 MALIGNANT NEOPLASM OF UNSPECIFIED SITE OF UNSPECIFIED FEMALE BREAST: ICD-10-CM

## 2023-10-30 DIAGNOSIS — Z98.890 OTHER SPECIFIED POSTPROCEDURAL STATES: Chronic | ICD-10-CM

## 2023-11-02 ENCOUNTER — APPOINTMENT (OUTPATIENT)
Dept: RHEUMATOLOGY | Facility: CLINIC | Age: 48
End: 2023-11-02
Payer: MEDICAID

## 2023-11-02 VITALS
TEMPERATURE: 98.2 F | HEIGHT: 62 IN | HEART RATE: 96 BPM | SYSTOLIC BLOOD PRESSURE: 122 MMHG | DIASTOLIC BLOOD PRESSURE: 82 MMHG | OXYGEN SATURATION: 99 %

## 2023-11-02 PROCEDURE — 99214 OFFICE O/P EST MOD 30 MIN: CPT

## 2023-11-03 ENCOUNTER — APPOINTMENT (OUTPATIENT)
Dept: HEMATOLOGY ONCOLOGY | Facility: CLINIC | Age: 48
End: 2023-11-03
Payer: MEDICAID

## 2023-11-03 VITALS
WEIGHT: 173 LBS | RESPIRATION RATE: 16 BRPM | HEIGHT: 61.97 IN | TEMPERATURE: 97.7 F | HEART RATE: 85 BPM | OXYGEN SATURATION: 99 % | BODY MASS INDEX: 31.83 KG/M2 | SYSTOLIC BLOOD PRESSURE: 150 MMHG | DIASTOLIC BLOOD PRESSURE: 89 MMHG

## 2023-11-03 PROCEDURE — 99214 OFFICE O/P EST MOD 30 MIN: CPT

## 2023-11-14 ENCOUNTER — APPOINTMENT (OUTPATIENT)
Dept: PHYSICAL MEDICINE AND REHAB | Facility: CLINIC | Age: 48
End: 2023-11-14
Payer: MEDICAID

## 2023-11-14 VITALS
RESPIRATION RATE: 15 BRPM | SYSTOLIC BLOOD PRESSURE: 144 MMHG | WEIGHT: 172 LBS | DIASTOLIC BLOOD PRESSURE: 90 MMHG | HEIGHT: 61.97 IN | BODY MASS INDEX: 31.65 KG/M2

## 2023-11-14 PROCEDURE — 99214 OFFICE O/P EST MOD 30 MIN: CPT

## 2023-11-14 RX ORDER — PREGABALIN 25 MG/1
25 CAPSULE ORAL AT BEDTIME
Qty: 30 | Refills: 0 | Status: ACTIVE | COMMUNITY
Start: 2023-11-14 | End: 1900-01-01

## 2023-12-08 ENCOUNTER — NON-APPOINTMENT (OUTPATIENT)
Age: 48
End: 2023-12-08

## 2023-12-12 ENCOUNTER — APPOINTMENT (OUTPATIENT)
Dept: PHYSICAL MEDICINE AND REHAB | Facility: CLINIC | Age: 48
End: 2023-12-12
Payer: MEDICAID

## 2023-12-12 VITALS
WEIGHT: 170 LBS | BODY MASS INDEX: 32.1 KG/M2 | HEART RATE: 67 BPM | RESPIRATION RATE: 14 BRPM | HEIGHT: 61 IN | DIASTOLIC BLOOD PRESSURE: 85 MMHG | SYSTOLIC BLOOD PRESSURE: 136 MMHG

## 2023-12-12 PROCEDURE — 99214 OFFICE O/P EST MOD 30 MIN: CPT

## 2023-12-18 ENCOUNTER — APPOINTMENT (OUTPATIENT)
Dept: OPHTHALMOLOGY | Facility: CLINIC | Age: 48
End: 2023-12-18
Payer: MEDICAID

## 2023-12-18 ENCOUNTER — NON-APPOINTMENT (OUTPATIENT)
Age: 48
End: 2023-12-18

## 2023-12-18 PROCEDURE — 92014 COMPRE OPH EXAM EST PT 1/>: CPT

## 2023-12-18 PROCEDURE — 92083 EXTENDED VISUAL FIELD XM: CPT

## 2024-01-08 ENCOUNTER — APPOINTMENT (OUTPATIENT)
Dept: INTERNAL MEDICINE | Facility: CLINIC | Age: 49
End: 2024-01-08

## 2024-01-08 ENCOUNTER — OUTPATIENT (OUTPATIENT)
Dept: OUTPATIENT SERVICES | Facility: HOSPITAL | Age: 49
LOS: 1 days | End: 2024-01-08

## 2024-01-08 DIAGNOSIS — Z98.890 OTHER SPECIFIED POSTPROCEDURAL STATES: Chronic | ICD-10-CM

## 2024-01-11 ENCOUNTER — APPOINTMENT (OUTPATIENT)
Dept: PHYSICAL MEDICINE AND REHAB | Facility: CLINIC | Age: 49
End: 2024-01-11
Payer: MEDICAID

## 2024-01-11 VITALS
WEIGHT: 170 LBS | HEIGHT: 61 IN | SYSTOLIC BLOOD PRESSURE: 160 MMHG | RESPIRATION RATE: 14 BRPM | DIASTOLIC BLOOD PRESSURE: 90 MMHG | HEART RATE: 69 BPM | BODY MASS INDEX: 32.1 KG/M2

## 2024-01-11 PROCEDURE — 99214 OFFICE O/P EST MOD 30 MIN: CPT

## 2024-01-11 RX ORDER — PREGABALIN 25 MG/1
25 CAPSULE ORAL
Qty: 90 | Refills: 1 | Status: ACTIVE | COMMUNITY
Start: 2023-12-12 | End: 1900-01-01

## 2024-01-11 NOTE — ASSESSMENT
[FreeTextEntry1] : 48 year old female presenting for evaluation.  #Lymphedema of left upper extremity -Patient without subjective symptoms, measurements stable  -compression sleeve and gauntlet 20-30mmHg-prn for symptoms  -Continue self-manual lymphatic drainage -Monitor -No clinical signs to suggest DVT  #Postmastectomy pain syndrome -Continue OT, case discussed with Ellen Ritchie-OT, continue with chest wall stretching/myofascial release   #Neurophatic pain -Stopped gabapentin -Continue pregabalin to 25mg morning, 50mg nightly  -rx sent -[facility] # [id number]  Follow up in 2 months.

## 2024-01-11 NOTE — REVIEW OF SYSTEMS
[Muscle Pain] : no muscle pain [Muscle Weakness] : no muscle weakness [Negative] : Heme/Lymph [FreeTextEntry9] : +Chest wall pain

## 2024-01-11 NOTE — HISTORY OF PRESENT ILLNESS
[FreeTextEntry1] : Ms. Flowers is a 48 year old female w/ PMHx of bilateral breast cancer. 8/11/17 S/p bilat BCS with R SLN, bilat breast reduction, oncoplastic surgery and biozorb insertion on for RIGHT UIQ [id number] Infiltrating ductal Cancer, margins 1.2 cm, Gr 2. LN 1/4, ER/IA positive and [id number] neg. S/p L SLNBx due to microinvasion on final path 10/13/17. Started tamoxifen 10/24/17. Finished EBRT 2/18/18 Dr. Nogueira rad onc.   She reports that pregabalin is helping.  Feels like it is helping with her chest wall burning.  She started occupational therapy thinks that this is helping.  Still feels tightness in her chest wall.  Denies any worsening swelling in her left upper extremity.  No redness erythema or increased warmth.

## 2024-01-11 NOTE — PHYSICAL EXAM
[FreeTextEntry1] : Gen: Patient is A&O x 3, NAD HEENT: EOMI, hearing grossly normal. Resp: regular, non - labored CV: pulses regular Skin: no rashes, erythema Lymph: No edema in UE, no pitting  Inspection: no instability or misalignment ROM: Left shoulder abduction 175 degrees, right shoulder 160 Palpation: TTP left chest wall Sensation: Intact to light touch Reflexes: 1+ and symmetric throughout Strength: 5/5 throughout Special tests: -Hawkin's sign bilateral Gait: normal, non-antalgic  Measurements were taken of the bilateral upper extremities which were as follows:  Left: 15 cm above the elbow: 35 cm 34 cm 36.5 cm 36.5 cm (37.5 cm) 10 cm above the elbow: 30 cm 30 cm 32.5 cm 32 cm (34 cm) 10 cm below the elbow: 20 cm 21 cm 21.5 cm 20.5 cm (21.1 cm) 15 cm below the elbow: 17.5 cm 18 cm 18 cm 17.5 cm (18.2 cm) wrist (at the ulnar styloid):14 cm 14.5 cm 15 cm 14.5 cm (14. 7 cm) Hand: 17 cm 17 cm 18 cm 17.5 cm (18 cm) base of 2nd digit: 6 cm 6 cm 6 cm 6 cm (5.7 cm)  Right: 15 cm above the elbow: 35.5 cm 37 cm 37.5 cm (37.5 cm) 10 cm above the elbow: 30.5 cm 32 cm 32 cm (31 cm) 10 cm below the elbow: 21.5 cm 21.5 cm 21.5 cm (20.5 cm) 15 cm below the elbow: 18 cm 18 cm 17. 5 cm (18 cm) wrist (at the ulnar styloid): 14.5 cm 15 cm 15 cm (15 cm) Hand: 17.5 cm 18 cm 18 cm (17.5 cm) base of 2nd digit: 6 cm 6 cm 5.9 cm (6 cm).

## 2024-01-19 ENCOUNTER — APPOINTMENT (OUTPATIENT)
Dept: RHEUMATOLOGY | Facility: CLINIC | Age: 49
End: 2024-01-19
Payer: MEDICAID

## 2024-01-19 VITALS
DIASTOLIC BLOOD PRESSURE: 72 MMHG | SYSTOLIC BLOOD PRESSURE: 140 MMHG | OXYGEN SATURATION: 98 % | BODY MASS INDEX: 32.1 KG/M2 | WEIGHT: 170 LBS | HEART RATE: 78 BPM | HEIGHT: 61 IN

## 2024-01-19 DIAGNOSIS — M79.671 PAIN IN RIGHT FOOT: ICD-10-CM

## 2024-01-19 DIAGNOSIS — M79.672 PAIN IN RIGHT FOOT: ICD-10-CM

## 2024-01-19 PROCEDURE — 99214 OFFICE O/P EST MOD 30 MIN: CPT

## 2024-01-19 PROCEDURE — G2211 COMPLEX E/M VISIT ADD ON: CPT

## 2024-01-19 NOTE — PHYSICAL EXAM
[General Appearance - Alert] : alert [General Appearance - In No Acute Distress] : in no acute distress [Sclera] : the sclera and conjunctiva were normal [Outer Ear] : the ears and nose were normal in appearance [Oropharynx] : the oropharynx was normal [Neck Appearance] : the appearance of the neck was normal [Neck Cervical Mass (___cm)] : no neck mass was observed [Thyroid Diffuse Enlargement] : the thyroid was not enlarged [Jugular Venous Distention Increased] : there was no jugular-venous distention [Thyroid Nodule] : there were no palpable thyroid nodules [Auscultation Breath Sounds / Voice Sounds] : lungs were clear to auscultation bilaterally [Heart Rate And Rhythm] : heart rate was normal and rhythm regular [Heart Sounds] : normal S1 and S2 [Heart Sounds Gallop] : no gallops [Murmurs] : no murmurs [Heart Sounds Pericardial Friction Rub] : no pericardial rub [Edema] : there was no peripheral edema [Bowel Sounds] : normal bowel sounds [Abdomen Soft] : soft [Abdomen Tenderness] : non-tender [Abdomen Mass (___ Cm)] : no abdominal mass palpated [Cervical Lymph Nodes Enlarged Posterior Bilaterally] : posterior cervical [Cervical Lymph Nodes Enlarged Anterior Bilaterally] : anterior cervical [Supraclavicular Lymph Nodes Enlarged Bilaterally] : supraclavicular [Skin Color & Pigmentation] : normal skin color and pigmentation [Skin Turgor] : normal skin turgor [] : no rash [No Focal Deficits] : no focal deficits [Oriented To Time, Place, And Person] : oriented to person, place, and time [Impaired Insight] : insight and judgment were intact [Affect] : the affect was normal [FreeTextEntry1] : no active synovitis; B/L shoulders w/ pain upon movement in all planes (R>L); B/L knees w/ pain upon flexion/extension; B/L ankles w/ (+)tenderness, worst laterally;  normal ROM in rest of joints

## 2024-01-19 NOTE — ASSESSMENT
[FreeTextEntry1] : 48 year old female with polyarticular joint pains and low back pain and found to have (+)HLA-B27 as well as diffuse enthesopathy - suggestive of undifferentiated spondyloarthropathy. MRI L-spine reveals degenerative changes / no evidence of spondylitis. MRI pelvis w/ no evidence of sacroiliitis. Symptoms had been improving on sulfasalazine, but it then lost effect. Had mild improvement on MTX, but did not tolerate it (abd pain/diarrhea). Had improved overall on MTX SC (Rasuvo) + sulfasalazine, but pt began experiencing abd pain of unclear etiology - possibly due to SSZ. Was then much improved on Rasuvo + leflunomide 20mg daily, but symptoms again began to recur. Had been unable to use biologics until recently given recent hx of breast CA, though she has now been in remission for >5 years. Had been improving on Enbrel + leflunomide, though the effect soon wore off.  Had been much improved on Cosentyx + leflunomide, though currently flaring as she has been off then due to several recent infections. Also w/ joint pains that appear to be due to other etiologies. Pt also recently found to have a full thickness tear of the supraspinatus in the right shoulder - now s/p surgery, though recent MRI revealed a re-tear. Also w/ severe B/L knee pain (R>L) - MRI revealed a medial meniscal tear - improved s/p recent corticosteroid injections by orthopedics. LBP currently worse - repeat MRI revealed OA changed.. Also now w/ severe B/L ankle/foot pain - appears non-inflammatory / no significant improvement w/ prednisone.  Pt has failed a physician-ordered exercise regimen, which she has been performing every day for the past 3 months and no improvement w/ NSAID's. MRI left foot w/ mild chondrolamalcia.  - Will restart Cosentyx 300mg qmonthly, leflunomide 20mg daily once current URI symptoms (primarily sore throat) resolve.  - Hepatitis panel negative 11/21. Quantiferon negative 8/2022.  - Flu vaccine (11/23), Pneumovax (10/21), Prevnar (8/20) UTD. Pt has received the COVID19 vaccine + booster + bivalent booster. She has received the first dose of Shingrix (9/23).  - Cont Celebrex 100mg daily prn. Can increase to 200mg daily prn as tolerated.  - Check labs, including Quantiferon.  - Reiterated importance of exercise, wesly back and foot/ankle.  - warm compresses  - OTC topical analgesics.  - Ortho f/u.  - MRI B/L ankles as pt has failed a physician-ordered exercise regimen, which she has been performing every day for the past 3 months and no improvement w/ NSAID's

## 2024-01-19 NOTE — HISTORY OF PRESENT ILLNESS
[___ Month(s) Ago] : [unfilled] month(s) ago [Fatigue] : fatigue [Arthralgias] : arthralgias [Myalgias] : myalgias [FreeTextEntry1] : Pt reports that she has experienced several infections since last visit - first a sinus infection, then a yeast infection, then a URI.  She has therefore been holding her RA meds and her joint pains have therefore been flaring, wesly in her hands and shoulders. Also w/ pain in her right Achilles tendon area. Feeling "the same" since last visit.  B/L ankle pain worse (R>L).  Still w/ LBP, unchanged overall though with "good days and bad days".   B/L knee pain remains improved s/p recent C-S injections by orthopedics, though experiences pain w/ prolonged walking.   No new complaints, [Anorexia] : no anorexia [Weight Loss] : no weight loss [Malaise] : no malaise [Fever] : no fever [Chills] : no chills [Malar Facial Rash] : no malar facial rash [Skin Lesions] : no lesions [Skin Nodules] : no skin nodules [Oral Ulcers] : no oral ulcers [Cough] : no cough [Dry Mouth] : no dry mouth [Dysphonia] : no dysphonia [Dysphagia] : no dysphagia [Shortness of Breath] : no shortness of breath [Chest Pain] : no chest pain [Joint Swelling] : no joint swelling [Joint Warmth] : no joint warmth [Joint Deformity] : no joint deformity [Decreased ROM] : no decreased range of motion [Morning Stiffness] : no morning stiffness [Falls] : no falls [Difficulty Standing] : no difficulty standing [Difficulty Walking] : no difficulty walking [Dyspnea] : no dyspnea [Muscle Weakness] : no muscle weakness [Muscle Spasms] : no muscle spasms [Muscle Cramping] : no muscle cramping [Visual Changes] : no visual changes [Eye Pain] : no eye pain [Eye Redness] : no eye redness [Dry Eyes] : no dry eyes

## 2024-02-04 ENCOUNTER — NON-APPOINTMENT (OUTPATIENT)
Age: 49
End: 2024-02-04

## 2024-02-05 ENCOUNTER — APPOINTMENT (OUTPATIENT)
Dept: MRI IMAGING | Facility: CLINIC | Age: 49
End: 2024-02-05

## 2024-02-06 ENCOUNTER — APPOINTMENT (OUTPATIENT)
Dept: RHEUMATOLOGY | Facility: CLINIC | Age: 49
End: 2024-02-06

## 2024-02-21 ENCOUNTER — NON-APPOINTMENT (OUTPATIENT)
Age: 49
End: 2024-02-21

## 2024-02-29 ENCOUNTER — RESULT REVIEW (OUTPATIENT)
Age: 49
End: 2024-02-29

## 2024-02-29 ENCOUNTER — APPOINTMENT (OUTPATIENT)
Dept: MRI IMAGING | Facility: CLINIC | Age: 49
End: 2024-02-29
Payer: MEDICAID

## 2024-02-29 ENCOUNTER — OUTPATIENT (OUTPATIENT)
Dept: OUTPATIENT SERVICES | Facility: HOSPITAL | Age: 49
LOS: 1 days | End: 2024-02-29
Payer: MEDICAID

## 2024-02-29 DIAGNOSIS — Z00.8 ENCOUNTER FOR OTHER GENERAL EXAMINATION: ICD-10-CM

## 2024-02-29 DIAGNOSIS — Z98.890 OTHER SPECIFIED POSTPROCEDURAL STATES: Chronic | ICD-10-CM

## 2024-02-29 PROCEDURE — 73721 MRI JNT OF LWR EXTRE W/O DYE: CPT | Mod: 26,LT

## 2024-02-29 PROCEDURE — 73721 MRI JNT OF LWR EXTRE W/O DYE: CPT

## 2024-03-07 ENCOUNTER — APPOINTMENT (OUTPATIENT)
Dept: RHEUMATOLOGY | Facility: CLINIC | Age: 49
End: 2024-03-07
Payer: MEDICAID

## 2024-03-07 VITALS
TEMPERATURE: 97.4 F | SYSTOLIC BLOOD PRESSURE: 130 MMHG | HEART RATE: 91 BPM | OXYGEN SATURATION: 99 % | DIASTOLIC BLOOD PRESSURE: 85 MMHG

## 2024-03-07 PROCEDURE — 99214 OFFICE O/P EST MOD 30 MIN: CPT

## 2024-03-07 PROCEDURE — G2211 COMPLEX E/M VISIT ADD ON: CPT | Mod: NC,1L

## 2024-03-07 NOTE — HISTORY OF PRESENT ILLNESS
[___ Month(s) Ago] : [unfilled] month(s) ago [Fatigue] : fatigue [Arthralgias] : arthralgias [Myalgias] : myalgias [FreeTextEntry1] : Feeling "the same" since last visit.  Still w/ polyarticular joint pains, worst in her B/L ankles (pt now reports the pain is worst in her heels) and right shoulder.  Also still w/ pain in her left 2nd finger radiating into her palm.  No new complaints.  [Anorexia] : no anorexia [Weight Loss] : no weight loss [Malaise] : no malaise [Fever] : no fever [Chills] : no chills [Malar Facial Rash] : no malar facial rash [Skin Lesions] : no lesions [Oral Ulcers] : no oral ulcers [Skin Nodules] : no skin nodules [Cough] : no cough [Dry Mouth] : no dry mouth [Dysphonia] : no dysphonia [Dysphagia] : no dysphagia [Shortness of Breath] : no shortness of breath [Chest Pain] : no chest pain [Joint Swelling] : no joint swelling [Joint Warmth] : no joint warmth [Joint Deformity] : no joint deformity [Decreased ROM] : no decreased range of motion [Morning Stiffness] : no morning stiffness [Falls] : no falls [Difficulty Standing] : no difficulty standing [Dyspnea] : no dyspnea [Difficulty Walking] : no difficulty walking [Muscle Weakness] : no muscle weakness [Muscle Spasms] : no muscle spasms [Muscle Cramping] : no muscle cramping [Visual Changes] : no visual changes [Eye Pain] : no eye pain [Eye Redness] : no eye redness [Dry Eyes] : no dry eyes

## 2024-03-07 NOTE — PHYSICAL EXAM
[General Appearance - Alert] : alert [Sclera] : the sclera and conjunctiva were normal [General Appearance - In No Acute Distress] : in no acute distress [Outer Ear] : the ears and nose were normal in appearance [Oropharynx] : the oropharynx was normal [Neck Cervical Mass (___cm)] : no neck mass was observed [Neck Appearance] : the appearance of the neck was normal [Jugular Venous Distention Increased] : there was no jugular-venous distention [Thyroid Diffuse Enlargement] : the thyroid was not enlarged [Thyroid Nodule] : there were no palpable thyroid nodules [Auscultation Breath Sounds / Voice Sounds] : lungs were clear to auscultation bilaterally [Heart Rate And Rhythm] : heart rate was normal and rhythm regular [Heart Sounds] : normal S1 and S2 [Heart Sounds Gallop] : no gallops [Murmurs] : no murmurs [Heart Sounds Pericardial Friction Rub] : no pericardial rub [Bowel Sounds] : normal bowel sounds [Edema] : there was no peripheral edema [Abdomen Tenderness] : non-tender [Abdomen Soft] : soft [Abdomen Mass (___ Cm)] : no abdominal mass palpated [Cervical Lymph Nodes Enlarged Anterior Bilaterally] : anterior cervical [Cervical Lymph Nodes Enlarged Posterior Bilaterally] : posterior cervical [Supraclavicular Lymph Nodes Enlarged Bilaterally] : supraclavicular [Skin Turgor] : normal skin turgor [Skin Color & Pigmentation] : normal skin color and pigmentation [] : no rash [No Focal Deficits] : no focal deficits [Oriented To Time, Place, And Person] : oriented to person, place, and time [Affect] : the affect was normal [Impaired Insight] : insight and judgment were intact [FreeTextEntry1] : no active synovitis; B/L shoulders w/ pain upon movement in all planes (R>L); B/L knees w/ pain upon flexion/extension; B/L ankles w/ (+)tenderness, worst laterally;  normal ROM in rest of joints

## 2024-03-07 NOTE — ASSESSMENT
[FreeTextEntry1] : 48 year old female with polyarticular joint pains and low back pain and found to have (+)HLA-B27 as well as diffuse enthesopathy - suggestive of undifferentiated spondyloarthropathy. MRI L-spine reveals degenerative changes / no evidence of spondylitis. MRI pelvis w/ no evidence of sacroiliitis. Symptoms had been improving on sulfasalazine, but it then lost effect. Had mild improvement on MTX, but did not tolerate it (abd pain/diarrhea). Had improved overall on MTX SC (Rasuvo) + sulfasalazine, but pt began experiencing abd pain of unclear etiology - possibly due to SSZ. Was then much improved on Rasuvo + leflunomide 20mg daily, but symptoms again began to recur. Had been unable to use biologics until recently given recent hx of breast CA, though she has now been in remission for >5 years. Had been improving on Enbrel + leflunomide, though the effect soon wore off.  Had been much improved on Cosentyx + leflunomide, though currently flaring as she had been off them due to several recent infections.  Now slowly improving since restarting.  Also w/ joint pains that appear to be due to other etiologies. Pt also recently found to have a full thickness tear of the supraspinatus in the right shoulder - now s/p surgery, though recent MRI revealed a re-tear. Also w/ severe B/L knee pain (R>L) - MRI revealed a medial meniscal tear - improved s/p recent corticosteroid injections by orthopedics. LBP currently worse - repeat MRI revealed OA changed.. Also now w/ severe B/L ankle/foot pain - most c/w plantar fasciitis.  MRI left foot w/ mild chondrolamalcia.  - Cont Cosentyx 300mg qmonthly, leflunomide 20mg daily.  - Hepatitis panel negative 11/21. Quantiferon negative 1/2024.  - Flu vaccine (11/23), Pneumovax (10/21), Prevnar (8/20) UTD. Pt has received the COVID19 vaccine + booster + bivalent booster. She has received the first dose of Shingrix (9/23).  - Cont Celebrex 100mg daily prn. Can increase to 200mg daily prn as tolerated.  - Check labs.  - Reiterated importance of exercise, wesly back and foot/ankle.  - warm compresses  - OTC topical analgesics.  - Ortho f/u.  - Recommended plantar fascia exercises.  - Roll frozen water bottle under heels  - Refer to podiatry for orthotics and/or corticosteroid injections.

## 2024-03-13 ENCOUNTER — APPOINTMENT (OUTPATIENT)
Dept: CARDIOLOGY | Facility: CLINIC | Age: 49
End: 2024-03-13
Payer: MEDICAID

## 2024-03-13 ENCOUNTER — NON-APPOINTMENT (OUTPATIENT)
Age: 49
End: 2024-03-13

## 2024-03-13 VITALS
SYSTOLIC BLOOD PRESSURE: 149 MMHG | HEIGHT: 62 IN | OXYGEN SATURATION: 100 % | DIASTOLIC BLOOD PRESSURE: 87 MMHG | HEART RATE: 81 BPM | BODY MASS INDEX: 30.73 KG/M2 | TEMPERATURE: 97.6 F | WEIGHT: 167 LBS | RESPIRATION RATE: 16 BRPM

## 2024-03-13 DIAGNOSIS — R42 DIZZINESS AND GIDDINESS: ICD-10-CM

## 2024-03-13 DIAGNOSIS — I10 ESSENTIAL (PRIMARY) HYPERTENSION: ICD-10-CM

## 2024-03-13 PROCEDURE — 93000 ELECTROCARDIOGRAM COMPLETE: CPT

## 2024-03-13 PROCEDURE — G2211 COMPLEX E/M VISIT ADD ON: CPT | Mod: NC,1L

## 2024-03-13 PROCEDURE — 99215 OFFICE O/P EST HI 40 MIN: CPT | Mod: 25

## 2024-03-13 RX ORDER — LOSARTAN POTASSIUM 25 MG/1
25 TABLET, FILM COATED ORAL
Qty: 90 | Refills: 1 | Status: ACTIVE | COMMUNITY
Start: 2024-03-13 | End: 1900-01-01

## 2024-03-13 NOTE — REVIEW OF SYSTEMS
[Fever] : no fever [Headache] : no headache [Weight Gain (___ Lbs)] : no recent weight gain [Chills] : no chills [Weight Loss (___ Lbs)] : no recent weight loss [Feeling Fatigued] : not feeling fatigued [Blurry Vision] : no blurred vision [Sore Throat] : no sore throat [Dyspnea on exertion] : not dyspnea during exertion [SOB] : no shortness of breath [Lower Ext Edema] : no extremity edema [Chest Discomfort] : no chest discomfort [Palpitations] : no palpitations [Orthopnea] : no orthopnea [Cough] : no cough [Nausea] : no nausea [Wheezing] : no wheezing [Vomiting] : no vomiting [Dizziness] : no dizziness [Confusion] : no confusion was observed [Easy Bleeding] : no tendency for easy bleeding [Easy Bruising] : no tendency for easy bruising

## 2024-03-13 NOTE — DISCUSSION/SUMMARY
[FreeTextEntry1] : 47F presents for f/u  1. elevated BP -prev on toprol, pt d/c it -d/w pt benefits of continuing bp meds -will start losartan 25 today  -cont bp log   f/u 3-4 months 40 min spent on complete encounter  [EKG obtained to assist in diagnosis and management of assessed problem(s)] : EKG obtained to assist in diagnosis and management of assessed problem(s)

## 2024-03-13 NOTE — PHYSICAL EXAM
[Well Developed] : well developed [No Acute Distress] : no acute distress [Well Nourished] : well nourished [Normal Venous Pressure] : normal venous pressure [Normal S1, S2] : normal S1, S2 [No Murmur] : no murmur [Clear Lung Fields] : clear lung fields [Good Air Entry] : good air entry [Soft] : abdomen soft [Non Tender] : non-tender [No Edema] : no edema [Normal Gait] : normal gait [Moves all extremities] : moves all extremities [No Focal Deficits] : no focal deficits [Alert and Oriented] : alert and oriented

## 2024-03-13 NOTE — HISTORY OF PRESENT ILLNESS
[FreeTextEntry1] : 47F presents for f/u Sent in by: Dr Stein PMD: lottie Herrmann.  Previously, pt seen here for an initial eval 4/2022, endorsing SOB and decreased exercise tolerance. pt on immunosuppressive therapy for several years for ankylosing spondylitis. tte done at that time grossly unremarkable. pt also had an exercise stress test, no evidence of ischemia. last seen 11/22 with dizziness immediately following a LE venous ablation. went to the ER at [place], states trop negative, d dimer negative, cxr clear. and d/c from the ER. pt comfortable appearing. tte and LE duplex done at that time, both grossly unremarkable last seen 4/23, pt recently  hospitalized at [place] 4/9-1/15 with a uti and human meta pneumovirus, also with hypertensive urgency on arrival. pt states she was started on BP meds while there, lopressor 12.5 bid, bp today here 139/83. pt states they are going to stop lopressor.   pt now presents for follow up. today,  pt feeling well today, +shoulder pain.  denies cp sob palpitations syncope  states chronic dizziness  pt checks bp at home, usually gets 120s/70s. not on any bp meds. states its only elevated when she is in pain or when she is at  the doctor.   pt feeling well, no complaints, no cp sob, no HA, blurry vision. denies palpitations  ekg today showing SR 76 bpm  Exercise: walk daily 3-5 miles, pilates. physical therapy Diet: none  Prior cardiac workup: none Recent labs:  Med hx: hx of breast CA, ? ankylosing spondylitis. OBGYN hx: 2 children, +gest dm. regular menstrual cycles. No hx of miscarriage. Sx hx: breast lumpectomy Fam hx: breast, lung, pancreatic CA, leukemia. Social hx: lives in [place], with sons. (recently  11/21). not working currently. no tob. wine several times a week. no drugs. Meds: gabapentin, leflunomide (immunosupressant for RA) tamoxifen methotrexate. Allergies: nkda

## 2024-03-14 ENCOUNTER — APPOINTMENT (OUTPATIENT)
Dept: PHYSICAL MEDICINE AND REHAB | Facility: CLINIC | Age: 49
End: 2024-03-14
Payer: MEDICAID

## 2024-03-14 VITALS
SYSTOLIC BLOOD PRESSURE: 146 MMHG | HEIGHT: 62 IN | RESPIRATION RATE: 14 BRPM | BODY MASS INDEX: 30.73 KG/M2 | HEART RATE: 86 BPM | WEIGHT: 167 LBS | DIASTOLIC BLOOD PRESSURE: 89 MMHG

## 2024-03-14 DIAGNOSIS — Z91.89 OTHER SPECIFIED PERSONAL RISK FACTORS, NOT ELSEWHERE CLASSIFIED: ICD-10-CM

## 2024-03-14 PROCEDURE — 99214 OFFICE O/P EST MOD 30 MIN: CPT

## 2024-03-14 NOTE — PHYSICAL EXAM
[FreeTextEntry1] : Gen: Patient is A&O x 3, NAD HEENT: EOMI, hearing grossly normal. Resp: regular, non - labored CV: pulses regular Skin: no rashes, erythema Lymph: No edema in UE, no pitting  Inspection: no instability or misalignment ROM: Left shoulder abduction 175 degrees, right shoulder 160 Palpation: TTP left chest wall, +Fibrosis  Sensation: Intact to light touch Reflexes: 1+ and symmetric throughout Strength: 5/5 throughout Special tests: -Hawkin's sign bilateral Gait: normal, non-antalgic  Measurements were taken of the bilateral upper extremities which were as follows:  Left: 15 cm above the elbow: 35 cm 34 cm 36.5 cm 36.5 cm (37.5 cm) 10 cm above the elbow: 30 cm 30 cm 32.5 cm 32 cm (34 cm) 10 cm below the elbow: 20 cm 21 cm 21.5 cm 20.5 cm (21.1 cm) 15 cm below the elbow: 17.5 cm 18 cm 18 cm 17.5 cm (18.2 cm) wrist (at the ulnar styloid):14 cm 14.5 cm 15 cm 14.5 cm (14. 7 cm) Hand: 17 cm 17 cm 18 cm 17.5 cm (18 cm) base of 2nd digit: 6 cm 6 cm 6 cm 6 cm (5.7 cm)  Right: 15 cm above the elbow: 35.5 cm 37 cm 37.5 cm (37.5 cm) 10 cm above the elbow: 30.5 cm 32 cm 32 cm (31 cm) 10 cm below the elbow: 21.5 cm 21.5 cm 21.5 cm (20.5 cm) 15 cm below the elbow: 18 cm 18 cm 17. 5 cm (18 cm) wrist (at the ulnar styloid): 14.5 cm 15 cm 15 cm (15 cm) Hand: 17.5 cm 18 cm 18 cm (17.5 cm) base of 2nd digit: 6 cm 6 cm 5.9 cm (6 cm).

## 2024-03-14 NOTE — ASSESSMENT
[FreeTextEntry1] : 48 year old female presenting for evaluation.  #Lymphedema of left upper extremity -Patient without subjective symptoms, measurements stable  -compression sleeve and gauntlet 20-30mmHg-prn for symptoms  -Continue self-manual lymphatic drainage -Monitor -No clinical signs to suggest DVT  #Postmastectomy pain syndrome -Continue OT, case discussed with Ellen Ritchie-OT, trial chip pad   #Neurophatic pain -Stopped gabapentin -Increase pregabalin to 50mg morning, 100mg nightly  -rx sent -[facility] #  [id number]  Follow up in 1 month.

## 2024-03-14 NOTE — HISTORY OF PRESENT ILLNESS
[FreeTextEntry1] : Ms. Flowers is a 48 year old female w/ PMHx of bilateral breast cancer. 8/11/17 S/p bilat BCS with R SLN, bilat breast reduction, oncoplastic surgery and biozorb insertion on for RIGHT UIQ [id number] Infiltrating ductal Cancer, margins 1.2 cm, Gr 2. LN 1/4, ER/MO positive and [id number] neg. S/p L SLNBx due to microinvasion on final path 10/13/17. Started tamoxifen 10/24/17. Finished EBRT 2/18/18 Dr. Nogueira rad onc.   She reports that she is feeling more tightness in her breast region.  She states her arm lymphedema has been stable.  Occasionally has a compression sleeve.  No erythema increased warmth.  Taking pregabalin thinks is helping but still feeling some burning pain.  No side effects current medication.  Working with OT.

## 2024-03-21 ENCOUNTER — RX RENEWAL (OUTPATIENT)
Age: 49
End: 2024-03-21

## 2024-03-21 RX ORDER — CELECOXIB 100 MG/1
100 CAPSULE ORAL
Qty: 60 | Refills: 1 | Status: ACTIVE | COMMUNITY
Start: 2023-06-29 | End: 1900-01-01

## 2024-04-18 ENCOUNTER — APPOINTMENT (OUTPATIENT)
Dept: PHYSICAL MEDICINE AND REHAB | Facility: CLINIC | Age: 49
End: 2024-04-18
Payer: MEDICAID

## 2024-04-18 VITALS
HEART RATE: 91 BPM | DIASTOLIC BLOOD PRESSURE: 89 MMHG | HEIGHT: 62 IN | WEIGHT: 167 LBS | BODY MASS INDEX: 30.73 KG/M2 | SYSTOLIC BLOOD PRESSURE: 155 MMHG | RESPIRATION RATE: 14 BRPM

## 2024-04-18 PROCEDURE — 99214 OFFICE O/P EST MOD 30 MIN: CPT

## 2024-04-18 NOTE — ASSESSMENT
[FreeTextEntry1] : 48 year old female presenting for evaluation.  #Lymphedema of left upper extremity -Measurement taken today -Continue compression sleeve and gauntlet 20-30mmHg daily  -Continue self-manual lymphatic drainage -Monitor -No clinical signs to suggest DVT -Discussed if worsens to alert me and will start CDT   #Postmastectomy pain syndrome -Continue OT  #Neurophatic pain -Continue pregabalin to 50mg morning, 100mg nightly  -rx sent -[facility] # [id number]  Follow up in 6 weeks

## 2024-04-18 NOTE — HISTORY OF PRESENT ILLNESS
[FreeTextEntry1] : Ms. Flowers is a 48 year old female w/ PMHx of bilateral breast cancer. 8/11/17 S/p bilat BCS with R SLN, bilat breast reduction, oncoplastic surgery and biozorb insertion on for RIGHT UIQ [id number] Infiltrating ductal Cancer, margins 1.2 cm, Gr 2. LN 1/4, ER/AK positive and [id number] neg. S/p L SLNBx due to microinvasion on final path 10/13/17. Started tamoxifen 10/24/17. Finished EBRT 2/18/18 Dr. Nogueira rad onc.   Since last visit: She feels tightness/swelling in the left arm. She continues in OT therapy.  She wears her compression sleeve.  Reports pregabalin is helping with chest wall pain.  No major side effects. No erythema or increased warmth in arm.

## 2024-04-18 NOTE — PHYSICAL EXAM
[FreeTextEntry1] : Gen: Patient is A&O x 3, NAD HEENT: EOMI, hearing grossly normal. Resp: regular, non - labored CV: pulses regular Skin: no rashes, erythema Lymph: mild edema in LUE when compared to right, no erythema or increased warmth  Inspection: no instability or misalignment ROM: FROM of bilateral shoulder Palpation: TTP left chest wall Sensation: Intact to light touch Reflexes: 1+ and symmetric throughout Strength: 5/5 throughout Special tests: + Hawkin's sign on right Gait: normal, non-antalgic  Measurements were taken of the bilateral upper extremities which were as follows:  Left: 15 cm above the elbow: 35.5 cm, 35 cm 34 cm 36.5 cm 36.5 cm (37.5 cm) 10 cm above the elbow: 31 cm, 30 cm 30 cm 32.5 cm 32 cm (34 cm) 10 cm below the elbow: 21 cm, 20 cm 21 cm 21.5 cm 20.5 cm (21.1 cm) 15 cm below the elbow: 17.5 cm, 17.5 cm 18 cm 18 cm 17.5 cm (18.2 cm) wrist (at the ulnar styloid): 15 cm, 14 cm 14.5 cm 15 cm 14.5 cm (14. 7 cm) Hand: 17.5 cm, 17 cm 17 cm 18 cm 17.5 cm (18 cm) base of 2nd digit: 6 cm, 6 cm 6 cm 6 cm 6 cm (5.7 cm)  Right: 15 cm above the elbow: 35 cm, 35.5 cm 37 cm 37.5 cm (37.5 cm) 10 cm above the elbow: 31 cm, 30.5 cm 32 cm 32 cm (31 cm) 10 cm below the elbow: 21 cm, 21.5 cm 21.5 cm 21.5 cm (20.5 cm) 15 cm below the elbow:  17.5 cm, 18 cm 18 cm 17. 5 cm (18 cm) wrist (at the ulnar styloid): 15 cm, 14.5 cm 15 cm 15 cm (15 cm) Hand: 17.5 cm, 17.5 cm 18 cm 18 cm (17.5 cm) base of 2nd digit: 6 cm, 6 cm 6 cm 5.9 cm (6 cm).

## 2024-04-18 NOTE — END OF VISIT
[] : Resident [FreeTextEntry3] : I have personally seen and examined the patient. I fully participated in the care of this patient. I have made amendments to the documentation where necessary, and agree with the history, physical exam, and plan as documented by the Resident, Dr. Carrillo.

## 2024-04-18 NOTE — REVIEW OF SYSTEMS
[Negative] : Heme/Lymph [Cough] : cough [Muscle Pain] : no muscle pain [Muscle Weakness] : no muscle weakness [FreeTextEntry6] : + congestion [FreeTextEntry9] : +Chest wall pain

## 2024-05-01 ENCOUNTER — APPOINTMENT (OUTPATIENT)
Dept: RHEUMATOLOGY | Facility: CLINIC | Age: 49
End: 2024-05-01
Payer: MEDICAID

## 2024-05-01 VITALS
WEIGHT: 167 LBS | BODY MASS INDEX: 30.73 KG/M2 | SYSTOLIC BLOOD PRESSURE: 141 MMHG | OXYGEN SATURATION: 98 % | HEART RATE: 74 BPM | HEIGHT: 62 IN | DIASTOLIC BLOOD PRESSURE: 83 MMHG

## 2024-05-01 PROCEDURE — G2211 COMPLEX E/M VISIT ADD ON: CPT | Mod: NC,1L

## 2024-05-01 PROCEDURE — 99214 OFFICE O/P EST MOD 30 MIN: CPT

## 2024-05-01 NOTE — ASSESSMENT
[FreeTextEntry1] : 48 year old female with polyarticular joint pains and low back pain and found to have (+)[id number] as well as diffuse enthesopathy - suggestive of undifferentiated spondyloarthropathy. MRI L-spine reveals degenerative changes / no evidence of spondylitis. MRI pelvis w/ no evidence of sacroiliitis. Symptoms had been improving on sulfasalazine, but it then lost effect. Had mild improvement on MTX, but did not tolerate it (abd pain/diarrhea). Had improved overall on MTX SC (Rasuvo) + sulfasalazine, but pt began experiencing abd pain of unclear etiology - possibly due to SSZ. Was then much improved on Rasuvo + leflunomide 20mg daily, but symptoms again began to recur. Had been unable to use biologics until recently given recent hx of breast CA, though she has now been in remission for >5 years. Had been improving on Enbrel + leflunomide, though the effect soon wore off.  Has been much improved on Cosentyx + leflunomide, though currently worse as she had been off them due to several recent infections (though no inflammatory symptoms).  Also w/ joint pains that appear to be due to other etiologies. Pt also recently found to have a full thickness tear of the supraspinatus in the right shoulder - now s/p surgery, though recent MRI revealed a re-tear. Also w/ severe B/L knee pain (R>L) - MRI revealed a medial meniscal tear - improved s/p recent corticosteroid injections by orthopedics. LBP currently worse - repeat MRI revealed OA unchanged.. Also now w/ severe B/L ankle/foot pain - most c/w plantar fasciitis.  MRI left foot w/ mild chondrolamalcia.  - Restart Cosentyx 300mg qmonthly, leflunomide 20mg daily as soon as current yeast infection resolves..  - Hepatitis panel negative 11/21. Quantiferon negative 1/2024.  - Flu vaccine (11/23), Pneumovax (10/21), Prevnar (8/20) UTD. Pt has received the COVID19 vaccine + booster + bivalent booster. She has received the first dose of Shingrix (9/23).  - Cont Celebrex 100mg daily prn. Can increase to 200mg daily prn as tolerated.  - Check labs.  - Reiterated importance of exercise, wesly back and foot/ankle.  - warm compresses  - OTC topical analgesics.  - Ortho f/u.  - Recommended plantar fascia exercises.  - Roll frozen water bottle under heels  - Awaiting podiatry eval for orthotics and/or corticosteroid injections.  - Minimize activities placing stress on the elbow  - ice packs to elbows  - tennis elbow band

## 2024-05-01 NOTE — HISTORY OF PRESENT ILLNESS
[___ Month(s) Ago] : [unfilled] month(s) ago [Fatigue] : fatigue [Arthralgias] : arthralgias [Myalgias] : myalgias [FreeTextEntry1] : 5/1/2024:  Pt reports that she has been experiencing frequent infections, including several yeast infections, a sinus infection, and a "nose infection" - she therefore has been holding Cosentyx.  She therefore c/o diffuse joint pain, though she denies any significant inflammatory symptoms.  She also reports that she has been experiencing intermittent episodes of redness and blurriness in her eyes.   3/7/2024:  Feeling "the same" since last visit.  Still w/ polyarticular joint pains, worst in her B/L ankles (pt now reports the pain is worst in her heels) and right shoulder.  Also still w/ pain in her left 2nd finger radiating into her palm.  No new complaints.  [Anorexia] : no anorexia [Weight Loss] : no weight loss [Malaise] : no malaise [Fever] : no fever [Chills] : no chills [Malar Facial Rash] : no malar facial rash [Skin Lesions] : no lesions [Skin Nodules] : no skin nodules [Oral Ulcers] : no oral ulcers [Cough] : no cough [Dry Mouth] : no dry mouth [Dysphonia] : no dysphonia [Dysphagia] : no dysphagia [Shortness of Breath] : no shortness of breath [Chest Pain] : no chest pain [Joint Swelling] : no joint swelling [Joint Warmth] : no joint warmth [Joint Deformity] : no joint deformity [Decreased ROM] : no decreased range of motion [Morning Stiffness] : no morning stiffness [Falls] : no falls [Difficulty Standing] : no difficulty standing [Difficulty Walking] : no difficulty walking [Dyspnea] : no dyspnea [Muscle Weakness] : no muscle weakness [Muscle Spasms] : no muscle spasms [Muscle Cramping] : no muscle cramping [Visual Changes] : no visual changes [Eye Pain] : no eye pain [Eye Redness] : no eye redness [Dry Eyes] : no dry eyes

## 2024-05-01 NOTE — PHYSICAL EXAM
[General Appearance - Alert] : alert [General Appearance - In No Acute Distress] : in no acute distress [Sclera] : the sclera and conjunctiva were normal [Outer Ear] : the ears and nose were normal in appearance [Oropharynx] : the oropharynx was normal [Neck Appearance] : the appearance of the neck was normal [Neck Cervical Mass (___cm)] : no neck mass was observed [Jugular Venous Distention Increased] : there was no jugular-venous distention [Thyroid Diffuse Enlargement] : the thyroid was not enlarged [Thyroid Nodule] : there were no palpable thyroid nodules [Auscultation Breath Sounds / Voice Sounds] : lungs were clear to auscultation bilaterally [Heart Rate And Rhythm] : heart rate was normal and rhythm regular [Heart Sounds] : normal S1 and S2 [Heart Sounds Gallop] : no gallops [Murmurs] : no murmurs [Heart Sounds Pericardial Friction Rub] : no pericardial rub [Edema] : there was no peripheral edema [Bowel Sounds] : normal bowel sounds [Abdomen Soft] : soft [Abdomen Tenderness] : non-tender [Abdomen Mass (___ Cm)] : no abdominal mass palpated [Cervical Lymph Nodes Enlarged Posterior Bilaterally] : posterior cervical [Supraclavicular Lymph Nodes Enlarged Bilaterally] : supraclavicular [Cervical Lymph Nodes Enlarged Anterior Bilaterally] : anterior cervical [Skin Color & Pigmentation] : normal skin color and pigmentation [Skin Turgor] : normal skin turgor [] : no rash [No Focal Deficits] : no focal deficits [Oriented To Time, Place, And Person] : oriented to person, place, and time [Impaired Insight] : insight and judgment were intact [Affect] : the affect was normal [FreeTextEntry1] : no active synovitis; B/L elbows w/ (+)tenderness over medial epicondyles, (+)pain upon resisted wrist extension;  B/L shoulders w/ pain upon movement in all planes (R>L); B/L knees w/ pain upon flexion/extension; B/L ankles w/ (+)tenderness, worst laterally;  normal ROM in rest of joints

## 2024-05-14 ENCOUNTER — OUTPATIENT (OUTPATIENT)
Dept: OUTPATIENT SERVICES | Facility: HOSPITAL | Age: 49
LOS: 1 days | Discharge: HOME | End: 2024-05-14

## 2024-05-14 DIAGNOSIS — Z98.890 OTHER SPECIFIED POSTPROCEDURAL STATES: Chronic | ICD-10-CM

## 2024-05-14 DIAGNOSIS — C50.919 MALIGNANT NEOPLASM OF UNSPECIFIED SITE OF UNSPECIFIED FEMALE BREAST: ICD-10-CM

## 2024-05-14 PROBLEM — Z92.89 HISTORY OF MAMMOGRAM: Status: RESOLVED | Noted: 2024-05-14 | Resolved: 2024-05-14

## 2024-05-14 PROBLEM — Z79.810 USE OF TAMOXIFEN (NOLVADEX): Status: ACTIVE | Noted: 2021-01-27

## 2024-05-14 PROBLEM — D05.12 DUCTAL CARCINOMA IN SITU (DCIS) OF LEFT BREAST: Status: ACTIVE | Noted: 2018-01-29

## 2024-05-14 PROBLEM — Z85.3 HISTORY OF MALIGNANT NEOPLASM OF LEFT BREAST: Status: RESOLVED | Noted: 2023-07-10 | Resolved: 2024-05-14

## 2024-05-21 ENCOUNTER — APPOINTMENT (OUTPATIENT)
Dept: HEMATOLOGY ONCOLOGY | Facility: CLINIC | Age: 49
End: 2024-05-21
Payer: MEDICAID

## 2024-05-21 VITALS
WEIGHT: 169.32 LBS | TEMPERATURE: 97.2 F | BODY MASS INDEX: 30.76 KG/M2 | DIASTOLIC BLOOD PRESSURE: 98 MMHG | OXYGEN SATURATION: 98 % | SYSTOLIC BLOOD PRESSURE: 152 MMHG | HEART RATE: 80 BPM | HEIGHT: 62.01 IN | RESPIRATION RATE: 16 BRPM

## 2024-05-21 DIAGNOSIS — D05.12 INTRADUCTAL CARCINOMA IN SITU OF LEFT BREAST: ICD-10-CM

## 2024-05-21 DIAGNOSIS — C50.911 MALIGNANT NEOPLASM OF UNSPECIFIED SITE OF RIGHT FEMALE BREAST: ICD-10-CM

## 2024-05-21 DIAGNOSIS — Z17.0 MALIGNANT NEOPLASM OF UNSPECIFIED SITE OF RIGHT FEMALE BREAST: ICD-10-CM

## 2024-05-21 DIAGNOSIS — Z85.3 PERSONAL HISTORY OF MALIGNANT NEOPLASM OF BREAST: ICD-10-CM

## 2024-05-21 DIAGNOSIS — Z79.810 LONG TERM (CURRENT) USE OF SELECTIVE ESTROGEN RECEPTOR MODULATORS (SERMS): ICD-10-CM

## 2024-05-21 DIAGNOSIS — Z63.5 DISRUPTION OF FAMILY BY SEPARATION AND DIVORCE: ICD-10-CM

## 2024-05-21 DIAGNOSIS — Z01.00 ENCOUNTER FOR EXAMINATION OF EYES AND VISION W/OUT ABNORMAL FINDINGS: ICD-10-CM

## 2024-05-21 DIAGNOSIS — C50.919 MALIGNANT NEOPLASM OF UNSPECIFIED SITE OF UNSPECIFIED FEMALE BREAST: ICD-10-CM

## 2024-05-21 DIAGNOSIS — Z92.89 PERSONAL HISTORY OF OTHER MEDICAL TREATMENT: ICD-10-CM

## 2024-05-21 PROCEDURE — 99215 OFFICE O/P EST HI 40 MIN: CPT

## 2024-05-21 RX ORDER — TAMOXIFEN CITRATE 20 MG/1
20 TABLET, FILM COATED ORAL DAILY
Qty: 90 | Refills: 1 | Status: ACTIVE | COMMUNITY
Start: 1900-01-01 | End: 1900-01-01

## 2024-05-21 SDOH — SOCIAL STABILITY - SOCIAL INSECURITY: DISRUPTION OF FAMILY BY SEPARATION AND DIVORCE: Z63.5

## 2024-05-21 NOTE — PHYSICAL EXAM
[Fully active, able to carry on all pre-disease performance without restriction] : Status 0 - Fully active, able to carry on all pre-disease performance without restriction [Normal] : affect appropriate [de-identified] : no dominant mass

## 2024-05-21 NOTE — HISTORY OF PRESENT ILLNESS
[Disease: _____________________] : Disease: [unfilled] [T: ___] : T[unfilled] [N: ___] : N[unfilled] [AJCC Stage: ____] : AJCC Stage: [unfilled] [de-identified] : 6/5/2017-Screening mammogram-with the finding of a right breast mass with associated architectural distortion suspicious for malignancy with ultrasound-guided core biopsy recommended. Bilateral breast ultrasound on the same date-with a finding of a hypoechoic mass with irregular margins and posterior shadowing at the 1 o'clock axis of the right breast corresponding to the findings on the mammogram with no further suspicious findings; ultrasound-guided core biopsy was recommended.   6/12/2017-Ultrasound-guided core biopsy right breast-with a finding of invasive moderately differentiated duct carcinoma, with the largest focus of invasive carcinoma measuring up to 5 mm with evidence of DCIS, intermediate nuclear grade, solid and cribriform types, with no evidence of lymphovascular invasion, ER positive (77%), MN positive (83%), and HER-2/carmen (1+) and negative. The patient then went on to have a bilateral   6/30/2017-Breast MRI-within the upper inner quadrant, a spiculated enhancing mass corresponding to the biopsy-proven cancer measuring up to 2 cm in the mediolateral direction, and 1.7 cm in the anterior-posterior direction, and 1.5 cm in the craniocaudal direction; left breast showed, within the upper outer quadrant, a somewhat serpiginous area of enhancement with the more anterior aspect becoming more nodular and measuring up to 1.1 cm in size, with additional scattered nonspecific enhancing foci; the axillae were unremarkable. 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   8/11/2017-S/P bilateral lumpectomy and reduction mammoplasty at [facility] (Dr. Caroline Benítez)-with a finding in the right breast of an invasive ductal carcinoma, moderately differentiated, measuring 2 cm in greatest diameter, nuclear, Franklin score 7/9, with evidence of DCIS and LCIS present in addition to fibrocystic changes with margins negative for carcinoma, with 0/3 sentinel lymph nodes involved with carcinoma;  the left breast excisional biopsy showed evidence of ductal carcinoma in situ with microinvasion, with DCIS being of high nuclear grade, solid and comedo type with central necrosis, evidence of lobular carcinoma in situ, with margins of resection negative for DCIS and microinvasion.   10/13/2017-S/P left sentinel lymph node biopsy with a finding of 0/3 left sentinel lymph nodes involved with carcinoma.  Oncotype DX testing for right breast carcinoma with a recurrence score of 7, correlating to an 8% risk of distant recurrence within 10 years should she take tamoxifen alone. The patient saw a medical oncologist, Dr. Maldonado, at [facility] who recommended adjuvant chemotherapy with CMF.   Saw Dr. Alejo who recommended endocrine therapy.  Started Tamoxifen on October 24th 2017.  She completed RT at [place] () on 2/13/18.  Patient was under the medical oncology care of Dr. Alejo until 1/2024.  [de-identified] : Invasive duct carcinoma [de-identified] :  ER positive (77%), AZ positive (83%), and HER-2/carmen (1+), negative; Ki 67 of 7.91% [de-identified] : BRCA 1/2 [employer] 3-site mutation panel, which reportedly returned negative ([facility] 6/2017).  Left breast -Stage IA (T1mi N0) microinvasive ductal carcinoma.  [de-identified] : This is my initial office visit with patient who was under the oncology care of Dr. Alejo, who has relocated his practice.   Patient sees rheumatology q 2-3 months for undifferentiated spondyloarthropathy, ?ankylosing spondylitis-on biologic therapy. Getting lymphedema therapy LUE; sees Dr. Dugan. Has colposcopy/bx. tomorrow. Has h/o HPV. Menses irregular with heavy flow at times. No longer working.

## 2024-05-21 NOTE — ASSESSMENT
[FreeTextEntry1] : CBC, CMP, breast pathology reports, mammogram/breast ultrasound report, breast MRI report, 11/3/2023 oncology note, 5/1/24 rheumatology note reviewed. #1) Stage IIA (T1cN1a) RIGHT breast invasive duct carcinoma, ER+/AL+/Her2- (1+), s/p right BCS/SLND 8/2017 Oncotype DX testing for right breast carcinoma with a recurrence score of 7, correlating to an 8% risk of distant recurrence within 10 years should she take tamoxifen alone.  Saw Dr. Alejo who recommended endocrine therapy, and started Tamoxifen on October 24th 2017. She completed RT at [place] () on 2/13/18.  --Reviewed diagnosis and management options -- Reviewed potential side effects of tamoxifen including but not limited to cataracts, thromboembolic disease, uterine cancer/sarcoma.  Patient consents to continue tamoxifen at this time.  Advised of at least yearly ophthalmologic and pelvic/Pap smear exams. --clinically CORIE  7/3/2023-Mammogram/breast US-benign [id number] 2. 2/4/2024-Breast MRI-benign [id number] 2. --next breast imaging ordered by surgeon  #2) Stage IA (T1miN0) LEFT breast Microinvasive ductal carcinoma/DCIS, s/p left BCS 8/2017/SLNB 10/2017.  --clinically CORIE.   Patient was given the opportunity to ask questions.  Her questions have been answered to her apparent satisfaction at this time.  She expressed her understanding and willingness to comply with recommended follow-up.  -->Tamoxifen 20 mg PO daily; RTO 6 months.

## 2024-05-21 NOTE — CONSULT LETTER
[Consult Letter:] : I had the pleasure of evaluating your patient, [unfilled]. [Please see my note below.] : Please see my note below. [Consult Closing:] : Thank you very much for allowing me to participate in the care of this patient.  If you have any questions, please do not hesitate to contact me. [Sincerely,] : Sincerely, [Dear  ___] : Dear  [unfilled], [FreeTextEntry3] : Ester Larson MD

## 2024-05-21 NOTE — RESULTS/DATA
[FreeTextEntry1] : 4/29/2024-hemoglobin 11.5, hematocrit 35.3, WBC 3.8 with 53% neutrophils, platelet count 180,000.  Creatinine 0.66, total bilirubin/alkaline phosphatase/AST/ALT within normal limits.

## 2024-05-30 ENCOUNTER — APPOINTMENT (OUTPATIENT)
Dept: PHYSICAL MEDICINE AND REHAB | Facility: CLINIC | Age: 49
End: 2024-05-30
Payer: MEDICAID

## 2024-05-30 VITALS
RESPIRATION RATE: 12 BRPM | HEIGHT: 62 IN | HEART RATE: 92 BPM | DIASTOLIC BLOOD PRESSURE: 87 MMHG | SYSTOLIC BLOOD PRESSURE: 140 MMHG | WEIGHT: 169 LBS | BODY MASS INDEX: 31.1 KG/M2

## 2024-05-30 DIAGNOSIS — G89.28 OTHER CHRONIC POSTPROCEDURAL PAIN: ICD-10-CM

## 2024-05-30 DIAGNOSIS — R60.9 EDEMA, UNSPECIFIED: ICD-10-CM

## 2024-05-30 DIAGNOSIS — I89.0 LYMPHEDEMA, NOT ELSEWHERE CLASSIFIED: ICD-10-CM

## 2024-05-30 DIAGNOSIS — M79.2 NEURALGIA AND NEURITIS, UNSPECIFIED: ICD-10-CM

## 2024-05-30 PROCEDURE — 99214 OFFICE O/P EST MOD 30 MIN: CPT

## 2024-05-30 RX ORDER — PREGABALIN 50 MG/1
50 CAPSULE ORAL
Qty: 90 | Refills: 1 | Status: ACTIVE | COMMUNITY
Start: 2024-03-14 | End: 1900-01-01

## 2024-05-30 NOTE — HISTORY OF PRESENT ILLNESS
[FreeTextEntry1] : Ms. Flowers is a 48 year old female w/ PMHx of bilateral breast cancer. 8/11/17 S/p bilat BCS with R SLN, bilat breast reduction, oncoplastic surgery and biozorb insertion on for RIGHT UIQ [id number] Infiltrating ductal Cancer, margins 1.2 cm, Gr 2. LN 1/4, ER/GA positive and Rui4waq neg. S/p L SLNBx due to microinvasion on final path 10/13/17. Started tamoxifen 10/24/17. Finished EBRT 2/18/18 Dr. Nogueira rad onc.   Since last visit: She reports that chest wall pain is improving with OT.  Pregabalin is helping.  Denies any major side effects.  Reports that she feels that she is getting more swelling and fullness in her left upper extremity.  No erythema or increased warmth.  She has been wearing compression sleeve regularly.

## 2024-05-30 NOTE — REVIEW OF SYSTEMS
[Cough] : cough [Muscle Pain] : no muscle pain [Muscle Weakness] : no muscle weakness [Negative] : Heme/Lymph [FreeTextEntry9] : +Chest wall pain

## 2024-05-30 NOTE — ASSESSMENT
[FreeTextEntry1] : 48 year old female presenting for evaluation.  #Lymphedema of left upper extremity -Obtain US LUE to rule out DVT -Continue compression sleeve and gauntlet 20-30mmHg daily  -Start MLD with PT -Case discussed with jay ashley-PT, start MLD   #Postmastectomy pain syndrome -Continue OT  #Neurophatic pain -Continue pregabalin to 50mg morning, 100mg nightly  -rx sent -[facility] Stop # [id number]  Follow up in 6 weeks

## 2024-05-30 NOTE — PHYSICAL EXAM
[FreeTextEntry1] : Gen: Patient is A&O x 3, NAD HEENT: EOMI, hearing grossly normal. Resp: regular, non - labored CV: pulses regular Skin: no rashes, erythema Lymph: mild edema in LUE when compared to right, no erythema or increased warmth  Inspection: no instability or misalignment ROM: FROM of bilateral shoulder Palpation: TTP left chest wall Sensation: Intact to light touch Reflexes: 1+ and symmetric throughout Strength: 5/5 throughout Special tests: + Hawkin's sign on right Gait: normal, non-antalgic  Past Measurements of the bilateral upper extremities which were as follows:  Left: 15 cm above the elbow: 35.5 cm, 35 cm 34 cm 36.5 cm 36.5 cm (37.5 cm) 10 cm above the elbow: 31 cm, 30 cm 30 cm 32.5 cm 32 cm (34 cm) 10 cm below the elbow: 21 cm, 20 cm 21 cm 21.5 cm 20.5 cm (21.1 cm) 15 cm below the elbow: 17.5 cm, 17.5 cm 18 cm 18 cm 17.5 cm (18.2 cm) wrist (at the ulnar styloid): 15 cm, 14 cm 14.5 cm 15 cm 14.5 cm (14. 7 cm) Hand: 17.5 cm, 17 cm 17 cm 18 cm 17.5 cm (18 cm) base of 2nd digit: 6 cm, 6 cm 6 cm 6 cm 6 cm (5.7 cm)  Right: 15 cm above the elbow: 35 cm, 35.5 cm 37 cm 37.5 cm (37.5 cm) 10 cm above the elbow: 31 cm, 30.5 cm 32 cm 32 cm (31 cm) 10 cm below the elbow: 21 cm, 21.5 cm 21.5 cm 21.5 cm (20.5 cm) 15 cm below the elbow:  17.5 cm, 18 cm 18 cm 17. 5 cm (18 cm) wrist (at the ulnar styloid): 15 cm, 14.5 cm 15 cm 15 cm (15 cm) Hand: 17.5 cm, 17.5 cm 18 cm 18 cm (17.5 cm) base of 2nd digit: 6 cm, 6 cm 6 cm 5.9 cm (6 cm).

## 2024-07-02 ENCOUNTER — APPOINTMENT (OUTPATIENT)
Dept: RHEUMATOLOGY | Facility: CLINIC | Age: 49
End: 2024-07-02
Payer: MEDICAID

## 2024-07-02 VITALS
HEART RATE: 86 BPM | RESPIRATION RATE: 17 BRPM | OXYGEN SATURATION: 99 % | SYSTOLIC BLOOD PRESSURE: 124 MMHG | DIASTOLIC BLOOD PRESSURE: 84 MMHG | HEIGHT: 62 IN

## 2024-07-02 DIAGNOSIS — M47.816 SPONDYLOSIS W/OUT MYELOPATHY OR RADICULOPATHY, LUMBAR REGION: ICD-10-CM

## 2024-07-02 DIAGNOSIS — M77.01 MEDIAL EPICONDYLITIS, RIGHT ELBOW: ICD-10-CM

## 2024-07-02 DIAGNOSIS — M15.9 POLYOSTEOARTHRITIS, UNSPECIFIED: ICD-10-CM

## 2024-07-02 DIAGNOSIS — M19.90 UNSPECIFIED OSTEOARTHRITIS, UNSPECIFIED SITE: ICD-10-CM

## 2024-07-02 DIAGNOSIS — M46.90 UNSPECIFIED INFLAMMATORY SPONDYLOPATHY, SITE UNSPECIFIED: ICD-10-CM

## 2024-07-02 DIAGNOSIS — M25.572 PAIN IN RIGHT ANKLE AND JOINTS OF RIGHT FOOT: ICD-10-CM

## 2024-07-02 DIAGNOSIS — M47.819 SPONDYLOSIS W/OUT MYELOPATHY OR RADICULOPATHY, SITE UNSPECIFIED: ICD-10-CM

## 2024-07-02 DIAGNOSIS — M25.562 PAIN IN RIGHT KNEE: ICD-10-CM

## 2024-07-02 DIAGNOSIS — M25.561 PAIN IN RIGHT KNEE: ICD-10-CM

## 2024-07-02 DIAGNOSIS — M75.101 UNSPECIFIED ROTATOR CUFF TEAR OR RUPTURE OF RIGHT SHOULDER, NOT SPECIFIED AS TRAUMATIC: ICD-10-CM

## 2024-07-02 DIAGNOSIS — R52 PAIN, UNSPECIFIED: ICD-10-CM

## 2024-07-02 DIAGNOSIS — Z79.60 LONG TERM (CURRENT) USE OF UNSPECIFIED IMMUNOMODULATORS AND IMMUNOSUPPRESSANTS: ICD-10-CM

## 2024-07-02 DIAGNOSIS — M77.02 MEDIAL EPICONDYLITIS, RIGHT ELBOW: ICD-10-CM

## 2024-07-02 DIAGNOSIS — R25.2 CRAMP AND SPASM: ICD-10-CM

## 2024-07-02 DIAGNOSIS — M25.571 PAIN IN RIGHT ANKLE AND JOINTS OF RIGHT FOOT: ICD-10-CM

## 2024-07-02 PROCEDURE — 99214 OFFICE O/P EST MOD 30 MIN: CPT

## 2024-07-02 PROCEDURE — G2211 COMPLEX E/M VISIT ADD ON: CPT | Mod: NC,1L

## 2024-07-10 ENCOUNTER — OUTPATIENT (OUTPATIENT)
Dept: OUTPATIENT SERVICES | Facility: HOSPITAL | Age: 49
LOS: 1 days | End: 2024-07-10

## 2024-07-10 ENCOUNTER — APPOINTMENT (OUTPATIENT)
Dept: INTERNAL MEDICINE | Facility: CLINIC | Age: 49
End: 2024-07-10

## 2024-07-10 DIAGNOSIS — Z98.890 OTHER SPECIFIED POSTPROCEDURAL STATES: Chronic | ICD-10-CM

## 2024-07-11 ENCOUNTER — APPOINTMENT (OUTPATIENT)
Dept: BREAST CENTER | Facility: CLINIC | Age: 49
End: 2024-07-11
Payer: MEDICAID

## 2024-07-11 ENCOUNTER — APPOINTMENT (OUTPATIENT)
Dept: PHYSICAL MEDICINE AND REHAB | Facility: CLINIC | Age: 49
End: 2024-07-11
Payer: MEDICAID

## 2024-07-11 VITALS
HEART RATE: 86 BPM | DIASTOLIC BLOOD PRESSURE: 80 MMHG | HEIGHT: 62 IN | OXYGEN SATURATION: 99 % | BODY MASS INDEX: 31.28 KG/M2 | TEMPERATURE: 97.2 F | SYSTOLIC BLOOD PRESSURE: 124 MMHG | WEIGHT: 170 LBS

## 2024-07-11 VITALS
HEIGHT: 62 IN | BODY MASS INDEX: 31.28 KG/M2 | DIASTOLIC BLOOD PRESSURE: 80 MMHG | RESPIRATION RATE: 14 BRPM | WEIGHT: 170 LBS | HEART RATE: 86 BPM | SYSTOLIC BLOOD PRESSURE: 124 MMHG

## 2024-07-11 DIAGNOSIS — G89.28 OTHER CHRONIC POSTPROCEDURAL PAIN: ICD-10-CM

## 2024-07-11 DIAGNOSIS — I89.0 LYMPHEDEMA, NOT ELSEWHERE CLASSIFIED: ICD-10-CM

## 2024-07-11 DIAGNOSIS — M79.2 NEURALGIA AND NEURITIS, UNSPECIFIED: ICD-10-CM

## 2024-07-11 DIAGNOSIS — R92.333 MAMMOGRAPHIC HETEROGENEOUS DENSITY, BILATERAL BREASTS: ICD-10-CM

## 2024-07-11 DIAGNOSIS — N63.0 UNSPECIFIED LUMP IN UNSPECIFIED BREAST: ICD-10-CM

## 2024-07-11 DIAGNOSIS — Z91.89 OTHER SPECIFIED PERSONAL RISK FACTORS, NOT ELSEWHERE CLASSIFIED: ICD-10-CM

## 2024-07-11 DIAGNOSIS — Z00.00 ENCOUNTER FOR GENERAL ADULT MEDICAL EXAMINATION W/OUT ABNORMAL FINDINGS: ICD-10-CM

## 2024-07-11 PROCEDURE — 99214 OFFICE O/P EST MOD 30 MIN: CPT

## 2024-07-13 ENCOUNTER — NON-APPOINTMENT (OUTPATIENT)
Age: 49
End: 2024-07-13

## 2024-07-15 ENCOUNTER — APPOINTMENT (OUTPATIENT)
Dept: CARDIOLOGY | Facility: CLINIC | Age: 49
End: 2024-07-15

## 2024-07-17 ENCOUNTER — NON-APPOINTMENT (OUTPATIENT)
Age: 49
End: 2024-07-17

## 2024-07-24 ENCOUNTER — OUTPATIENT (OUTPATIENT)
Dept: OUTPATIENT SERVICES | Facility: HOSPITAL | Age: 49
LOS: 1 days | Discharge: HOME | End: 2024-07-24

## 2024-07-24 DIAGNOSIS — Z98.890 OTHER SPECIFIED POSTPROCEDURAL STATES: Chronic | ICD-10-CM

## 2024-07-24 DIAGNOSIS — C50.919 MALIGNANT NEOPLASM OF UNSPECIFIED SITE OF UNSPECIFIED FEMALE BREAST: ICD-10-CM

## 2024-08-01 ENCOUNTER — APPOINTMENT (OUTPATIENT)
Dept: HEMATOLOGY ONCOLOGY | Facility: CLINIC | Age: 49
End: 2024-08-01

## 2024-08-01 NOTE — HISTORY OF PRESENT ILLNESS
[Disease: _____________________] : Disease: [unfilled] [T: ___] : T[unfilled] [N: ___] : N[unfilled] [AJCC Stage: ____] : AJCC Stage: [unfilled] [de-identified] : Yumi Flowers is a 48 yr old female with bilateral breast cancer diagnosed at age 42 (6/12/2017)   6/5/2017-Screening mammogram-with the finding of a right breast mass with associated architectural distortion suspicious for malignancy with ultrasound-guided core biopsy recommended. Bilateral breast ultrasound on the same date-with a finding of a hypoechoic mass with irregular margins and posterior shadowing at the 1 o'clock axis of the right breast corresponding to the findings on the mammogram with no further suspicious findings; ultrasound-guided core biopsy was recommended.   6/12/2017-Ultrasound-guided core biopsy right breast-with a finding of invasive moderately differentiated duct carcinoma, with the largest focus of invasive carcinoma measuring up to 5 mm with evidence of DCIS, intermediate nuclear grade, solid and cribriform types, with no evidence of lymphovascular invasion, ER positive (77%), IL positive (83%), and HER-2/carmen (1+) and negative.   6/30/2017-Breast MRI-within the upper inner quadrant, a spiculated enhancing mass corresponding to the biopsy-proven cancer measuring up to 2 cm in the mediolateral direction, and 1.7 cm in the anterior-posterior direction, and 1.5 cm in the craniocaudal direction; left breast showed, within the upper outer quadrant, a somewhat serpiginous area of enhancement with the more anterior aspect becoming more nodular and measuring up to 1.1 cm in size, with additional scattered nonspecific enhancing foci; the axillae were unremarkable.   7/11/[id number] left breast biopsy-with a finding of ductal carcinoma in situ with high nuclear grade and central necrosis, with one small focus of lobular carcinoma in situ noted. ER 78.35 IL 85.14%  8/11/2017-S/P bilateral lumpectomy and reduction mammoplasty at [facility] (Dr. Caroline Benítez)-with a finding in the right breast of an invasive ductal carcinoma, moderately differentiated, measuring 2 cm in greatest diameter, nuclear, [place] score 7/9, with evidence of DCIS and LCIS present in addition to fibrocystic changes with margins negative for carcinoma,  with 1 sentinel lymph node positive for metastatic adenocarcinoma, one lymph node with micrometastasis <2mm ; the left breast excisional biopsy showed evidence of ductal carcinoma in situ with microinvasion, with DCIS being of high nuclear grade, solid and comedo type with central necrosis, evidence of lobular carcinoma in situ, with margins of resection negative for DCIS and microinvasion.   10/13/2017-S/P left sentinel lymph node biopsy with a finding of 0/3 left sentinel lymph nodes involved with carcinoma.  8/30/24 Oncotype DX testing for right breast carcinoma with a recurrence score of 7, correlating to an 8% risk of distant recurrence within 10 years should she take tamoxifen alone. The patient saw a medical oncologist, Dr. Maldonado, at [facility] who recommended adjuvant chemotherapy with CMF.   Saw Dr. Alejo who recommended endocrine therapy.  Started Tamoxifen on October 24th 2017.  She completed RT at [place] () on 2/13/18.  Patient was under the medical oncology care of Dr. Alejo until 1/2024.  [de-identified] : Invasive duct carcinoma [de-identified] :  ER positive (77%), [place] positive (83%), and HER-2/carmen (1+), negative; Ki 67 of 7.91% [de-identified] : BRCA 1/2 [employer] 3-site mutation panel, which reportedly returned negative ([facility] 6/2017).  Left breast -Stage IA (T1mi N0) microinvasive ductal carcinoma.

## 2024-08-01 NOTE — ASSESSMENT
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  10/13/2017-S/P left sentinel lymph node biopsy with a finding of 0/3 left sentinel lymph nodes involved with carcinoma. Saw Dr. Alejo who recommended endocrine therapy, and started Tamoxifen on October 24th 2017. She completed RT at [place] () on 2/13/18. 7/3/2023-Mammogram/breast US-benign [id number] 2. 2/4/2024-Breast MRI-benign [id number] 2.         -->Tamoxifen 20 mg PO daily; RTO 6 months.

## 2024-08-01 NOTE — PHYSICAL EXAM
[Fully active, able to carry on all pre-disease performance without restriction] : Status 0 - Fully active, able to carry on all pre-disease performance without restriction [Normal] : affect appropriate [de-identified] : no dominant mass

## 2024-08-07 ENCOUNTER — APPOINTMENT (OUTPATIENT)
Dept: HEMATOLOGY ONCOLOGY | Facility: CLINIC | Age: 49
End: 2024-08-07

## 2024-08-07 PROBLEM — Z51.81 ENCOUNTER FOR MONITORING TAMOXIFEN THERAPY: Status: ACTIVE | Noted: 2024-08-07

## 2024-08-07 PROCEDURE — G2211 COMPLEX E/M VISIT ADD ON: CPT | Mod: NC,1L

## 2024-08-07 PROCEDURE — 99215 OFFICE O/P EST HI 40 MIN: CPT

## 2024-08-07 NOTE — ASSESSMENT
[FreeTextEntry1] : 48 yr old premenopausal female with bilateral breast cancer diagnosed at age 42  (6/12/2017)   #1) Stage IIA (T1cN1a) RIGHT breast invasive duct carcinoma, ER 77% NE 83%, HEr2 1+/negative. #2) Stage IA (T1miN0) LEFT breast Microinvasive ductal carcinoma/DCIS, ER 78.35 NE 85.14%  8/11/2017 S/P bilateral lumpectomy/SLND, and reduction mammoplasty  8/30/27 Oncotype DX testing for right breast carcinoma with a recurrence score of 7, correlating to an 8% risk of distant recurrence within 10 years should she take tamoxifen alone.  10/13/2017-S/P left sentinel lymph node biopsy with a finding of 0/3 left sentinel lymph nodes involved with carcinoma. She started Tamoxifen on October 24th 2017. She completed RT at [place] () on 2/13/18.  2/4/2024-Breast MRI-benign [id number] 2. 7/8/2024 MMG/sono - CORIE  Her prior records were extensively reviewed.  LMP 6/2024  Plan -Continue Tamoxifen 20 mg PO daily, started 10/24/17. Plan for 10 years of treatment -continue GYN follow up -Continue follow up with Dr. Caroline Benítez.  MRI breast 2/2025 -RTO in 6 months.

## 2024-08-07 NOTE — ASSESSMENT
[FreeTextEntry1] : 48 yr old premenopausal female with bilateral breast cancer diagnosed at age 42  (6/12/2017)   #1) Stage IIA (T1cN1a) RIGHT breast invasive duct carcinoma, ER 77% MT 83%, HEr2 1+/negative. #2) Stage IA (T1miN0) LEFT breast Microinvasive ductal carcinoma/DCIS, ER 78.35 MT 85.14%  8/11/2017 S/P bilateral lumpectomy/SLND, and reduction mammoplasty  8/30/27 Oncotype DX testing for right breast carcinoma with a recurrence score of 7, correlating to an 8% risk of distant recurrence within 10 years should she take tamoxifen alone.  10/13/2017-S/P left sentinel lymph node biopsy with a finding of 0/3 left sentinel lymph nodes involved with carcinoma. She started Tamoxifen on October 24th 2017. She completed RT at [place] () on 2/13/18.  2/4/2024-Breast MRI-benign [id number] 2. 7/8/2024 MMG/sono - CORIE  Her prior records were extensively reviewed.  LMP 6/2024  Plan -Continue Tamoxifen 20 mg PO daily, started 10/24/17. Plan for 10 years of treatment -continue GYN follow up -Continue follow up with Dr. Caroline Benítez.  MRI breast 2/2025 -RTO in 6 months.

## 2024-08-07 NOTE — HISTORY OF PRESENT ILLNESS
[de-identified] : Yumi Flowers is a 48 yr old female with bilateral breast cancer diagnosed at age 42 (6/12/2017)  6/5/2017-Screening mammogram-with the finding of a right breast mass with associated architectural distortion suspicious for malignancy with ultrasound-guided core biopsy recommended. Bilateral breast ultrasound on the same date-with a finding of a hypoechoic mass with irregular margins and posterior shadowing at the 1 o'clock axis of the right breast corresponding to the findings on the mammogram with no further suspicious findings; ultrasound-guided core biopsy was recommended.   6/12/2017-Ultrasound-guided core biopsy right breast-with a finding of invasive moderately differentiated duct carcinoma, with the largest focus of invasive carcinoma measuring up to 5 mm with evidence of DCIS, intermediate nuclear grade, solid and cribriform types, with no evidence of lymphovascular invasion, ER positive (77%), NC positive (83%), and HER-2/carmen (1+) and negative.   6/30/2017-Breast MRI-within the upper inner quadrant, a spiculated enhancing mass corresponding to the biopsy-proven cancer measuring up to 2 cm in the mediolateral direction, and 1.7 cm in the anterior-posterior direction, and 1.5 cm in the craniocaudal direction; left breast showed, within the upper outer quadrant, a somewhat serpiginous area of enhancement with the more anterior aspect becoming more nodular and measuring up to 1.1 cm in size, with additional scattered nonspecific enhancing foci; the axillae were unremarkable.   7/11/[id number] left breast biopsy-with a finding of ductal carcinoma in situ with high nuclear grade and central necrosis, with one small focus of lobular carcinoma in situ noted. ER 78.35 NC 85.14%  8/11/2017-S/P bilateral lumpectomy and reduction mammoplasty at [facility] (Dr. Caroline Benítez)-with a finding in the right breast of an invasive ductal carcinoma, moderately differentiated, measuring 2 cm in greatest diameter, nuclear, [place] score 7/9, with evidence of DCIS and LCIS present in addition to fibrocystic changes with margins negative for carcinoma,  with 1 sentinel lymph node positive for metastatic adenocarcinoma, one lymph node with micrometastasis <2mm ; the left breast excisional biopsy showed evidence of ductal carcinoma in situ with microinvasion, with DCIS being of high nuclear grade (R+ 78.3%/NC+ 85.14%), solid and comedo type with central necrosis, evidence of lobular carcinoma in situ, with margins of resection negative for DCIS and microinvasion.   10/13/2017-S/P left sentinel lymph node biopsy with a finding of 0/3 left sentinel lymph nodes involved with carcinoma.  8/30/17 Oncotype DX testing for right breast carcinoma with a recurrence score of 7, correlating to an 8% risk of distant recurrence within 10 years should she take tamoxifen alone. The patient saw a medical oncologist, Dr. Maldonado, at [facility] who recommended adjuvant chemotherapy with CMF.   Saw Dr. Alejo who recommended endocrine therapy.  Started Tamoxifen on October 24th 2017.  She completed RT at [place] () on 2/13/18.    PMHx: b/l breast cancer, spondylarthritis, lymphedema FMHx of CA: several on maternal side of family SHx: b/l lumpectomy, R rotator cuff repair SocHx: non smoker, not working currently 2/2 health issues  2x genetic testing (Dr Benítez and Dr Alejo each ordered) LMP: 6/2024, previously every month (lot of bleeding lasting 21-28 days)  Screening Last colonoscopy: UTD Last mammo in 7/8/24, breast MRI in 2/2024  Care Team: Dr. Dugan (PM&R) Dr Abdoul Benítez (Breast SURG) Dr Jay Charles (RHEUM) [de-identified] : Invasive duct carcinoma [de-identified] :  ER positive (77%), MT positive (83%), and HER-2/carmen (1+), negative; Ki 67 of 7.91% [de-identified] : BRCA 1/2 [employer] 3-site mutation panel, which reportedly returned negative ([facility] 6/2017).  Left breast -Stage IA (T1mi N0) microinvasive ductal carcinoma.  [de-identified] : Patient was under the medical oncology care of Dr. Alejo until 1/2024. Presents as transfer of care. She reports that she continues to take Tamoxifen, started 10/24/17. She has ASCUS high risk HPV+, cervical pap 1/2024 also has LSIL, colposcopy with Dr Stanley 6/2024  Follows regularly with GYN and reports a Hx of chronic yeast infections. c/o edema of UE and LE, following with Dr. Dugan for management Notes she has been taking Cosentyx for 1.5 years for spondylarthritis, following w/ Dr Charles (RHEUM) Endorses Vit D supplementation.

## 2024-08-07 NOTE — ADDENDUM
[FreeTextEntry1] : Documented by Rambo Tinajero acting as scribe for Dr. Alberto on 08/07/2024.   All Medical record entries made by the Scribe were at my, Dr. Alberto's, direction and personally dictated by me on 08/07/2024. I have reviewed the chart and agree that the record accurately reflects my personal performance of the history, physical exam, assessment and plan. I have also personally directed, reviewed, and agreed with the discharge instructions.

## 2024-08-07 NOTE — HISTORY OF PRESENT ILLNESS
[de-identified] : Yumi Flowers is a 48 yr old female with bilateral breast cancer diagnosed at age 42 (6/12/2017)  6/5/2017-Screening mammogram-with the finding of a right breast mass with associated architectural distortion suspicious for malignancy with ultrasound-guided core biopsy recommended. Bilateral breast ultrasound on the same date-with a finding of a hypoechoic mass with irregular margins and posterior shadowing at the 1 o'clock axis of the right breast corresponding to the findings on the mammogram with no further suspicious findings; ultrasound-guided core biopsy was recommended.   6/12/2017-Ultrasound-guided core biopsy right breast-with a finding of invasive moderately differentiated duct carcinoma, with the largest focus of invasive carcinoma measuring up to 5 mm with evidence of DCIS, intermediate nuclear grade, solid and cribriform types, with no evidence of lymphovascular invasion, ER positive (77%), SC positive (83%), and HER-2/carmen (1+) and negative.   6/30/2017-Breast MRI-within the upper inner quadrant, a spiculated enhancing mass corresponding to the biopsy-proven cancer measuring up to 2 cm in the mediolateral direction, and 1.7 cm in the anterior-posterior direction, and 1.5 cm in the craniocaudal direction; left breast showed, within the upper outer quadrant, a somewhat serpiginous area of enhancement with the more anterior aspect becoming more nodular and measuring up to 1.1 cm in size, with additional scattered nonspecific enhancing foci; the axillae were unremarkable.   7/11/[id number] left breast biopsy-with a finding of ductal carcinoma in situ with high nuclear grade and central necrosis, with one small focus of lobular carcinoma in situ noted. ER 78.35 SC 85.14%  8/11/2017-S/P bilateral lumpectomy and reduction mammoplasty at [facility] (Dr. Caorline Benítez)-with a finding in the right breast of an invasive ductal carcinoma, moderately differentiated, measuring 2 cm in greatest diameter, nuclear, [place] score 7/9, with evidence of DCIS and LCIS present in addition to fibrocystic changes with margins negative for carcinoma,  with 1 sentinel lymph node positive for metastatic adenocarcinoma, one lymph node with micrometastasis <2mm ; the left breast excisional biopsy showed evidence of ductal carcinoma in situ with microinvasion, with DCIS being of high nuclear grade (R+ 78.3%/SC+ 85.14%), solid and comedo type with central necrosis, evidence of lobular carcinoma in situ, with margins of resection negative for DCIS and microinvasion.   10/13/2017-S/P left sentinel lymph node biopsy with a finding of 0/3 left sentinel lymph nodes involved with carcinoma.  8/30/17 Oncotype DX testing for right breast carcinoma with a recurrence score of 7, correlating to an 8% risk of distant recurrence within 10 years should she take tamoxifen alone. The patient saw a medical oncologist, Dr. Maldonado, at [facility] who recommended adjuvant chemotherapy with CMF.   Saw Dr. Alejo who recommended endocrine therapy.  Started Tamoxifen on October 24th 2017.  She completed RT at [place] () on 2/13/18.    PMHx: b/l breast cancer, spondylarthritis, lymphedema FMHx of CA: several on maternal side of family SHx: b/l lumpectomy, R rotator cuff repair SocHx: non smoker, not working currently 2/2 health issues  2x genetic testing (Dr Benítez and Dr Alejo each ordered) LMP: 6/2024, previously every month (lot of bleeding lasting 21-28 days)  Screening Last colonoscopy: UTD Last mammo in 7/8/24, breast MRI in 2/2024  Care Team: Dr. Dugan (PM&R) Dr Abdoul Benítez (Breast SURG) Dr Jay Charles (RHEUM) [de-identified] : Invasive duct carcinoma [de-identified] :  ER positive (77%), CT positive (83%), and HER-2/carmen (1+), negative; Ki 67 of 7.91% [de-identified] : BRCA 1/2 [employer] 3-site mutation panel, which reportedly returned negative ([facility] 6/2017).  Left breast -Stage IA (T1mi N0) microinvasive ductal carcinoma.  [de-identified] : Patient was under the medical oncology care of Dr. Alejo until 1/2024. Presents as transfer of care. She reports that she continues to take Tamoxifen, started 10/24/17. She has ASCUS high risk HPV+, cervical pap 1/2024 also has LSIL, colposcopy with Dr Stanley 6/2024  Follows regularly with GYN and reports a Hx of chronic yeast infections. c/o edema of UE and LE, following with Dr. Dugan for management Notes she has been taking Cosentyx for 1.5 years for spondylarthritis, following w/ Dr Charles (RHEUM) Endorses Vit D supplementation.

## 2024-08-12 NOTE — ADDENDUM
Acute mechanical low back strain.  Patient is given a Medrol Dosepak and Robaxin 750 mg to 3 times daily.  He is also given some aggressive stretching exercises and told to use a heating pad.  We also discussed importance of core strengthening exercises and he will get into that in a more regular basis.  Patient will call if symptoms do not improve.   [FreeTextEntry1] : MRI 1/8/21 Z-P BR1.

## 2024-08-14 ENCOUNTER — APPOINTMENT (OUTPATIENT)
Dept: CARDIOLOGY | Facility: CLINIC | Age: 49
End: 2024-08-14
Payer: MEDICAID

## 2024-08-14 ENCOUNTER — NON-APPOINTMENT (OUTPATIENT)
Age: 49
End: 2024-08-14

## 2024-08-14 VITALS
TEMPERATURE: 97 F | HEART RATE: 100 BPM | DIASTOLIC BLOOD PRESSURE: 77 MMHG | SYSTOLIC BLOOD PRESSURE: 111 MMHG | OXYGEN SATURATION: 97 % | HEIGHT: 62 IN

## 2024-08-14 DIAGNOSIS — I10 ESSENTIAL (PRIMARY) HYPERTENSION: ICD-10-CM

## 2024-08-14 DIAGNOSIS — R42 DIZZINESS AND GIDDINESS: ICD-10-CM

## 2024-08-14 DIAGNOSIS — R06.09 OTHER FORMS OF DYSPNEA: ICD-10-CM

## 2024-08-14 PROCEDURE — G2211 COMPLEX E/M VISIT ADD ON: CPT | Mod: NC,1L

## 2024-08-14 PROCEDURE — 99215 OFFICE O/P EST HI 40 MIN: CPT | Mod: 25

## 2024-08-14 PROCEDURE — 93000 ELECTROCARDIOGRAM COMPLETE: CPT

## 2024-08-14 NOTE — HISTORY OF PRESENT ILLNESS
[FreeTextEntry1] : 48F w HTN presents for f/u Sent in by: Dr Stein PMD: Dr Friend [place].  Previously, pt seen here for an initial eval 4/2022, endorsing SOB and decreased exercise tolerance. pt on immunosuppressive therapy for several years for ankylosing spondylitis. tte done at that time grossly unremarkable. pt also had an exercise stress test, no evidence of ischemia. last seen 11/22 with dizziness immediately following a LE venous ablation. went to the ER at [facility], states trop negative, d dimer negative, cxr clear. and d/c from the ER. pt comfortable appearing. tte and LE duplex done at that time, both grossly unremarkable 4/23, pt recently hospitalized at [place] 4/9-1/15 with a uti and human meta pneumovirus, also with hypertensive urgency on arrival. pt states she was started on BP meds while there, lopressor 12.5 bid, bp today here 139/83. pt states they are going to stop lopressor. last seen 3/24, feeling well, no cp sob. bp elevated, losartan 25 added  pt now presents for follow up. today,  pt feeling well today, denies cp sob palpitations syncope states chronic dizziness, unchanged from baseline  pt checks bp at home, usually gets 120s/70s. on losartan but sates intermittent compliance. pt staes she is focused more on managing her stress. pt states bp is only high when she is upset/stress   ekg today showing SR 93 bpm  Exercise: walk daily 3-5 miles, pilates. physical therapy Diet: none  Prior cardiac workup: none Recent labs:  Med hx: hx of breast CA, ? ankylosing spondylitis. OBGYN hx: 2 children, +gest dm. regular menstrual cycles. No hx of miscarriage. Sx hx: breast lumpectomy Fam hx: breast, lung, pancreatic CA, leukemia. Social hx: lives in [place], with sons. (recently  11/21). not working currently. no tob. wine several times a week. no drugs. Meds: gabapentin, leflunomide (immunosupressant for RA) tamoxifen methotrexate. losartan 25 Allergies: nkda

## 2024-08-14 NOTE — DISCUSSION/SUMMARY
[FreeTextEntry1] : 48F presents for f/u  1. elevated BP -prev on toprol, pt d/c it -d/w pt benefits of continuing bp meds, cont losartan 25, compliance urged.  -cont bp log   f/u 6 months 40 min spent on complete encounter  [EKG obtained to assist in diagnosis and management of assessed problem(s)] : EKG obtained to assist in diagnosis and management of assessed problem(s)

## 2024-08-14 NOTE — HISTORY OF PRESENT ILLNESS
[FreeTextEntry1] : 48F w HTN presents for f/u Sent in by: Dr Stein PMD: Dr Friend [place].  Previously, pt seen here for an initial eval 4/2022, endorsing SOB and decreased exercise tolerance. pt on immunosuppressive therapy for several years for ankylosing spondylitis. tte done at that time grossly unremarkable. pt also had an exercise stress test, no evidence of ischemia. last seen 11/22 with dizziness immediately following a LE venous ablation. went to the ER at [place], states trop negative, d dimer negative, cxr clear. and d/c from the ER. pt comfortable appearing. tte and LE duplex done at that time, both grossly unremarkable 4/23, pt recently hospitalized at [place] 4/9-1/15 with a uti and human meta pneumovirus, also with hypertensive urgency on arrival. pt states she was started on BP meds while there, lopressor 12.5 bid, bp today here 139/83. pt states they are going to stop lopressor. last seen 3/24, feeling well, no cp sob. bp elevated, losartan 25 added  pt now presents for follow up. today,  pt feeling well today, denies cp sob palpitations syncope states chronic dizziness, unchanged from baseline  pt checks bp at home, usually gets 120s/70s. on losartan but sates intermittent compliance. pt staes she is focused more on managing her stress. pt states bp is only high when she is upset/stress   ekg today showing SR 93 bpm  Exercise: walk daily 3-5 miles, pilates. physical therapy Diet: none  Prior cardiac workup: none Recent labs:  Med hx: hx of breast CA, ? ankylosing spondylitis. OBGYN hx: 2 children, +gest dm. regular menstrual cycles. No hx of miscarriage. Sx hx: breast lumpectomy Fam hx: breast, lung, pancreatic CA, leukemia. Social hx: lives in [place], with sons. (recently  11/21). not working currently. no tob. wine several times a week. no drugs. Meds: gabapentin, leflunomide (immunosupressant for RA) tamoxifen methotrexate. losartan 25 Allergies: nkda

## 2024-08-22 ENCOUNTER — APPOINTMENT (OUTPATIENT)
Dept: PHYSICAL MEDICINE AND REHAB | Facility: CLINIC | Age: 49
End: 2024-08-22

## 2024-08-23 ENCOUNTER — NON-APPOINTMENT (OUTPATIENT)
Age: 49
End: 2024-08-23

## 2024-09-05 ENCOUNTER — APPOINTMENT (OUTPATIENT)
Dept: PHYSICAL MEDICINE AND REHAB | Facility: CLINIC | Age: 49
End: 2024-09-05
Payer: MEDICAID

## 2024-09-05 VITALS
BODY MASS INDEX: 31.28 KG/M2 | SYSTOLIC BLOOD PRESSURE: 138 MMHG | HEIGHT: 62 IN | HEART RATE: 73 BPM | WEIGHT: 170 LBS | DIASTOLIC BLOOD PRESSURE: 85 MMHG | RESPIRATION RATE: 14 BRPM

## 2024-09-05 DIAGNOSIS — I89.0 LYMPHEDEMA, NOT ELSEWHERE CLASSIFIED: ICD-10-CM

## 2024-09-05 DIAGNOSIS — M79.2 NEURALGIA AND NEURITIS, UNSPECIFIED: ICD-10-CM

## 2024-09-05 PROCEDURE — 99214 OFFICE O/P EST MOD 30 MIN: CPT

## 2024-09-05 RX ORDER — PREGABALIN 25 MG/1
25 CAPSULE ORAL
Qty: 90 | Refills: 2 | Status: ACTIVE | COMMUNITY
Start: 2024-09-05 | End: 1900-01-01

## 2024-09-05 NOTE — PHYSICAL EXAM
[FreeTextEntry1] : Gen: Patient is A&O x 3, NAD HEENT: EOMI, hearing grossly normal. Resp: regular, non - labored CV: pulses regular Skin: no rashes, erythema Lymph: mild edema in LUE when compared to right, no erythema or increased warmth  Inspection: no instability or misalignment ROM: FROM of bilateral shoulder Palpation: TTP left chest wall Sensation: Intact to light touch Reflexes: 1+ and symmetric throughout Strength: 5/5 throughout Special tests: -Hawkin's sign on right Gait: normal, non-antalgic  Past Measurements of the bilateral upper extremities which were as follows:  Left: 15 cm above the elbow: 35.5 cm, 35 cm 34 cm 36.5 cm 36.5 cm (37.5 cm) 10 cm above the elbow: 31 cm, 30 cm 30 cm 32.5 cm 32 cm (34 cm) 10 cm below the elbow: 21 cm, 20 cm 21 cm 21.5 cm 20.5 cm (21.1 cm) 15 cm below the elbow: 17.5 cm, 17.5 cm 18 cm 18 cm 17.5 cm (18.2 cm) wrist (at the ulnar styloid): 15 cm, 14 cm 14.5 cm 15 cm 14.5 cm (14. 7 cm) Hand: 17.5 cm, 17 cm 17 cm 18 cm 17.5 cm (18 cm) base of 2nd digit: 6 cm, 6 cm 6 cm 6 cm 6 cm (5.7 cm)  Right: 15 cm above the elbow: 35 cm, 35.5 cm 37 cm 37.5 cm (37.5 cm) 10 cm above the elbow: 31 cm, 30.5 cm 32 cm 32 cm (31 cm) 10 cm below the elbow: 21 cm, 21.5 cm 21.5 cm 21.5 cm (20.5 cm) 15 cm below the elbow:  17.5 cm, 18 cm 18 cm 17. 5 cm (18 cm) wrist (at the ulnar styloid): 15 cm, 14.5 cm 15 cm 15 cm (15 cm) Hand: 17.5 cm, 17.5 cm 18 cm 18 cm (17.5 cm) base of 2nd digit: 6 cm, 6 cm 6 cm 5.9 cm (6 cm).

## 2024-09-05 NOTE — HISTORY OF PRESENT ILLNESS
[FreeTextEntry1] : Ms. Flowers is a 48 year old female w/ PMHx of bilateral breast cancer. 8/11/17 S/p bilat BCS with R SLN, bilat breast reduction, oncoplastic surgery and biozorb insertion on for RIGHT UIQ [id number] Infiltrating ductal Cancer, margins 1.2 cm, Gr 2. LN 1/4, ER/WA positive and [id number] neg. S/p L SLNBx due to microinvasion on final path 10/13/17. Started tamoxifen 10/24/17. Finished EBRT 2/18/18 Dr. Nogueira rad onc.   Since last visit: She reports that lymphedema is improving with therapy.  Still feels extensive tightness in her chest wall.  Reports that Lyrica is helping but sometimes she feels dizzy she would like to decrease dose otherwise denies any major side effects.  No new focal weakness.

## 2024-09-05 NOTE — ASSESSMENT
[FreeTextEntry1] : 48 year old female presenting for evaluation.  #Lymphedema UE: -Continue compression sleeve and gauntlet 20-30mmHg daily prn -Continue MLD -Recommend pneumatic compression pump   #Postmastectomy pain syndrome -Continue PT  #Neurophatic pain -Decrease pregabalin to 25mg morning, 50mg nightly -rx sent -I Stop # [id number]  Follow up in 6 weeks

## 2024-09-12 ENCOUNTER — APPOINTMENT (OUTPATIENT)
Dept: RHEUMATOLOGY | Facility: CLINIC | Age: 49
End: 2024-09-12
Payer: MEDICAID

## 2024-09-12 VITALS
HEART RATE: 92 BPM | DIASTOLIC BLOOD PRESSURE: 86 MMHG | HEIGHT: 62 IN | SYSTOLIC BLOOD PRESSURE: 138 MMHG | OXYGEN SATURATION: 93 % | TEMPERATURE: 97.6 F

## 2024-09-12 DIAGNOSIS — H53.9 UNSPECIFIED VISUAL DISTURBANCE: ICD-10-CM

## 2024-09-12 DIAGNOSIS — M47.816 SPONDYLOSIS W/OUT MYELOPATHY OR RADICULOPATHY, LUMBAR REGION: ICD-10-CM

## 2024-09-12 DIAGNOSIS — R52 PAIN, UNSPECIFIED: ICD-10-CM

## 2024-09-12 DIAGNOSIS — M15.0 PRIMARY GENERALIZED (OSTEO)ARTHRITIS: ICD-10-CM

## 2024-09-12 DIAGNOSIS — M25.562 PAIN IN RIGHT KNEE: ICD-10-CM

## 2024-09-12 DIAGNOSIS — M25.572 PAIN IN RIGHT ANKLE AND JOINTS OF RIGHT FOOT: ICD-10-CM

## 2024-09-12 DIAGNOSIS — M77.02 MEDIAL EPICONDYLITIS, RIGHT ELBOW: ICD-10-CM

## 2024-09-12 DIAGNOSIS — M77.01 MEDIAL EPICONDYLITIS, RIGHT ELBOW: ICD-10-CM

## 2024-09-12 DIAGNOSIS — M47.819 SPONDYLOSIS W/OUT MYELOPATHY OR RADICULOPATHY, SITE UNSPECIFIED: ICD-10-CM

## 2024-09-12 DIAGNOSIS — M19.90 UNSPECIFIED OSTEOARTHRITIS, UNSPECIFIED SITE: ICD-10-CM

## 2024-09-12 DIAGNOSIS — M25.571 PAIN IN RIGHT ANKLE AND JOINTS OF RIGHT FOOT: ICD-10-CM

## 2024-09-12 DIAGNOSIS — M46.90 UNSPECIFIED INFLAMMATORY SPONDYLOPATHY, SITE UNSPECIFIED: ICD-10-CM

## 2024-09-12 DIAGNOSIS — M25.561 PAIN IN RIGHT KNEE: ICD-10-CM

## 2024-09-12 DIAGNOSIS — Z79.60 LONG TERM (CURRENT) USE OF UNSPECIFIED IMMUNOMODULATORS AND IMMUNOSUPPRESSANTS: ICD-10-CM

## 2024-09-12 DIAGNOSIS — R25.2 CRAMP AND SPASM: ICD-10-CM

## 2024-09-12 PROCEDURE — G2211 COMPLEX E/M VISIT ADD ON: CPT | Mod: NC

## 2024-09-12 PROCEDURE — 99214 OFFICE O/P EST MOD 30 MIN: CPT

## 2024-09-12 RX ORDER — OMEPRAZOLE 40 MG/1
40 CAPSULE, DELAYED RELEASE ORAL
Refills: 0 | Status: ACTIVE | COMMUNITY

## 2024-09-12 RX ORDER — LEVOFLOXACIN 500 MG/1
500 TABLET, FILM COATED ORAL
Refills: 0 | Status: ACTIVE | COMMUNITY

## 2024-09-12 NOTE — HISTORY OF PRESENT ILLNESS
[___ Month(s) Ago] : [unfilled] month(s) ago [Fatigue] : fatigue [Arthralgias] : arthralgias [Myalgias] : myalgias [FreeTextEntry1] : 9/12/2024:  Pt reports that she was dx'ed w/ h. pylori last month, so she had to stop taking Cosentyx.  Pt currently c/o pain in her lower back, B/L hips, left knee, and right ankle.   7/2/2024:  Pt reports that she restarted Cosentyx 1 month ago - joint pains have slightly improved. Still w/ LBP, unchanged - she says she can't sit for more than 30 minutes,  No new complaints. 5/1/2024:  Pt reports that she has been experiencing frequent infections, including several yeast infections, a sinus infection, and a "nose infection" - she therefore has been holding Cosentyx.  She therefore c/o diffuse joint pain, though she denies any significant inflammatory symptoms.  She also reports that she has been experiencing intermittent episodes of redness and blurriness in her eyes.   3/7/2024:  Feeling "the same" since last visit.  Still w/ polyarticular joint pains, worst in her B/L ankles (pt now reports the pain is worst in her heels) and right shoulder.  Also still w/ pain in her left 2nd finger radiating into her palm.  No new complaints.  [Anorexia] : no anorexia [Weight Loss] : no weight loss [Malaise] : no malaise [Fever] : no fever [Chills] : no chills [Malar Facial Rash] : no malar facial rash [Skin Lesions] : no lesions [Skin Nodules] : no skin nodules [Oral Ulcers] : no oral ulcers [Cough] : no cough [Dry Mouth] : no dry mouth [Dysphonia] : no dysphonia [Dysphagia] : no dysphagia [Shortness of Breath] : no shortness of breath [Chest Pain] : no chest pain [Joint Swelling] : no joint swelling [Joint Warmth] : no joint warmth [Joint Deformity] : no joint deformity [Decreased ROM] : no decreased range of motion [Morning Stiffness] : no morning stiffness [Falls] : no falls [Difficulty Standing] : no difficulty standing [Difficulty Walking] : no difficulty walking [Dyspnea] : no dyspnea [Muscle Weakness] : no muscle weakness [Muscle Spasms] : no muscle spasms [Muscle Cramping] : no muscle cramping [Visual Changes] : no visual changes [Eye Pain] : no eye pain [Eye Redness] : no eye redness [Dry Eyes] : no dry eyes

## 2024-09-12 NOTE — ASSESSMENT
[FreeTextEntry1] : 48 year old female with polyarticular joint pains and low back pain and found to have (+)[id number] as well as diffuse enthesopathy - suggestive of undifferentiated spondyloarthropathy. MRI L-spine reveals degenerative changes / no evidence of spondylitis. MRI pelvis w/ no evidence of sacroiliitis. Symptoms had been improving on sulfasalazine, but it then lost effect. Had mild improvement on MTX, but did not tolerate it (abd pain/diarrhea). Had improved overall on MTX SC (Rasuvo) + sulfasalazine, but pt began experiencing abd pain of unclear etiology - possibly due to SSZ. Was then much improved on Rasuvo + leflunomide 20mg daily, but symptoms again began to recur. Had been unable to use biologics until recently given recent hx of breast CA, though she has now been in remission for >5 years. Had been improving on Enbrel + leflunomide, though the effect soon wore off.  Had been much improved on Cosentyx + leflunomide, though she held then recently due to several recent infections..  Also w/ joint pains that appear to be due to other etiologies. Pt also recently found to have a full thickness tear of the supraspinatus in the right shoulder - now s/p surgery, though recent MRI revealed a re-tear. Also w/ severe B/L knee pain (R>L) - MRI revealed a medial meniscal tear - improved s/p recent corticosteroid injections by orthopedics. LBP currently worse - repeat MRI revealed OA, unchanged.. Also now w/ severe B/L ankle/foot pain - most c/w plantar fasciitis.  MRI left foot w/ mild chondromalacia.  - Restart Cosentyx 300mg qmonthly once current h. pylori infection has been completely treated.  D/C leflunomide as pt w/ frequent infections since starting it.  - Hepatitis panel negative 11/21. Quantiferon negative 1/2024.  - Pneumovax (10/21), Prevnar (8/20) UTD. Pt has received the COVID19 vaccine + booster + bivalent booster. She has received Shingrix.  - Cont Celebrex 100mg daily prn. Can increase to 200mg daily prn as tolerated.  - Check labs.  - Reiterated importance of exercise, wesly back and foot/ankle.  - warm compresses  - OTC topical analgesics.  - Ortho f/u.  - Recommended plantar fascia exercises.  - Roll frozen water bottle under heels  - Awaiting podiatry eval for orthotics and/or corticosteroid injections.  - Minimize activities placing stress on the elbow  - ice packs to elbows  - tennis elbow band

## 2024-09-12 NOTE — PHYSICAL EXAM
[General Appearance - Alert] : alert [General Appearance - In No Acute Distress] : in no acute distress [Sclera] : the sclera and conjunctiva were normal [Outer Ear] : the ears and nose were normal in appearance [Oropharynx] : the oropharynx was normal [Neck Appearance] : the appearance of the neck was normal [Neck Cervical Mass (___cm)] : no neck mass was observed [Jugular Venous Distention Increased] : there was no jugular-venous distention [Thyroid Diffuse Enlargement] : the thyroid was not enlarged [Thyroid Nodule] : there were no palpable thyroid nodules [Heart Rate And Rhythm] : heart rate was normal and rhythm regular [Auscultation Breath Sounds / Voice Sounds] : lungs were clear to auscultation bilaterally [Heart Sounds] : normal S1 and S2 [Heart Sounds Gallop] : no gallops [Murmurs] : no murmurs [Heart Sounds Pericardial Friction Rub] : no pericardial rub [Edema] : there was no peripheral edema [Bowel Sounds] : normal bowel sounds [Abdomen Soft] : soft [Abdomen Tenderness] : non-tender [Abdomen Mass (___ Cm)] : no abdominal mass palpated [Cervical Lymph Nodes Enlarged Anterior Bilaterally] : anterior cervical [Cervical Lymph Nodes Enlarged Posterior Bilaterally] : posterior cervical [Supraclavicular Lymph Nodes Enlarged Bilaterally] : supraclavicular [Skin Color & Pigmentation] : normal skin color and pigmentation [Skin Turgor] : normal skin turgor [] : no rash [No Focal Deficits] : no focal deficits [Oriented To Time, Place, And Person] : oriented to person, place, and time [Impaired Insight] : insight and judgment were intact [Affect] : the affect was normal [FreeTextEntry1] : no active synovitis; B/L elbows w/ (+)tenderness over medial epicondyles, (+)pain upon resisted wrist extension;  B/L shoulders w/ pain upon movement in all planes (R>L); B/L knees w/ pain upon flexion/extension; B/L ankles w/ (+)tenderness, worst laterally;  normal ROM in rest of joints

## 2024-09-19 ENCOUNTER — NON-APPOINTMENT (OUTPATIENT)
Age: 49
End: 2024-09-19

## 2024-10-24 ENCOUNTER — APPOINTMENT (OUTPATIENT)
Dept: PHYSICAL MEDICINE AND REHAB | Facility: CLINIC | Age: 49
End: 2024-10-24

## 2024-10-28 ENCOUNTER — APPOINTMENT (OUTPATIENT)
Dept: PHYSICAL MEDICINE AND REHAB | Facility: CLINIC | Age: 49
End: 2024-10-28
Payer: MEDICAID

## 2024-10-28 VITALS
DIASTOLIC BLOOD PRESSURE: 82 MMHG | SYSTOLIC BLOOD PRESSURE: 141 MMHG | RESPIRATION RATE: 15 BRPM | WEIGHT: 169 LBS | HEART RATE: 81 BPM | OXYGEN SATURATION: 99 % | HEIGHT: 62 IN | BODY MASS INDEX: 31.1 KG/M2

## 2024-10-28 DIAGNOSIS — M79.2 NEURALGIA AND NEURITIS, UNSPECIFIED: ICD-10-CM

## 2024-10-28 DIAGNOSIS — G89.28 OTHER CHRONIC POSTPROCEDURAL PAIN: ICD-10-CM

## 2024-10-28 DIAGNOSIS — I89.0 LYMPHEDEMA, NOT ELSEWHERE CLASSIFIED: ICD-10-CM

## 2024-10-28 PROCEDURE — 99214 OFFICE O/P EST MOD 30 MIN: CPT

## 2024-11-04 ENCOUNTER — RX RENEWAL (OUTPATIENT)
Age: 49
End: 2024-11-04

## 2024-11-19 ENCOUNTER — APPOINTMENT (OUTPATIENT)
Dept: HEMATOLOGY ONCOLOGY | Facility: CLINIC | Age: 49
End: 2024-11-19

## 2024-11-21 ENCOUNTER — APPOINTMENT (OUTPATIENT)
Dept: RHEUMATOLOGY | Facility: CLINIC | Age: 49
End: 2024-11-21
Payer: MEDICAID

## 2024-11-21 ENCOUNTER — NON-APPOINTMENT (OUTPATIENT)
Age: 49
End: 2024-11-21

## 2024-11-21 VITALS
WEIGHT: 169 LBS | DIASTOLIC BLOOD PRESSURE: 80 MMHG | BODY MASS INDEX: 31.1 KG/M2 | HEIGHT: 62 IN | SYSTOLIC BLOOD PRESSURE: 120 MMHG | TEMPERATURE: 96.6 F

## 2024-11-21 DIAGNOSIS — M77.01 MEDIAL EPICONDYLITIS, RIGHT ELBOW: ICD-10-CM

## 2024-11-21 DIAGNOSIS — M79.671 PAIN IN RIGHT FOOT: ICD-10-CM

## 2024-11-21 DIAGNOSIS — M19.90 UNSPECIFIED OSTEOARTHRITIS, UNSPECIFIED SITE: ICD-10-CM

## 2024-11-21 DIAGNOSIS — Z79.60 LONG TERM (CURRENT) USE OF UNSPECIFIED IMMUNOMODULATORS AND IMMUNOSUPPRESSANTS: ICD-10-CM

## 2024-11-21 DIAGNOSIS — R25.2 CRAMP AND SPASM: ICD-10-CM

## 2024-11-21 DIAGNOSIS — M25.512 PAIN IN LEFT SHOULDER: ICD-10-CM

## 2024-11-21 DIAGNOSIS — M25.562 PAIN IN RIGHT KNEE: ICD-10-CM

## 2024-11-21 DIAGNOSIS — M25.561 PAIN IN RIGHT KNEE: ICD-10-CM

## 2024-11-21 DIAGNOSIS — M77.02 MEDIAL EPICONDYLITIS, RIGHT ELBOW: ICD-10-CM

## 2024-11-21 DIAGNOSIS — R52 PAIN, UNSPECIFIED: ICD-10-CM

## 2024-11-21 DIAGNOSIS — M15.0 PRIMARY GENERALIZED (OSTEO)ARTHRITIS: ICD-10-CM

## 2024-11-21 DIAGNOSIS — H53.9 UNSPECIFIED VISUAL DISTURBANCE: ICD-10-CM

## 2024-11-21 DIAGNOSIS — M25.571 PAIN IN RIGHT ANKLE AND JOINTS OF RIGHT FOOT: ICD-10-CM

## 2024-11-21 DIAGNOSIS — M47.816 SPONDYLOSIS W/OUT MYELOPATHY OR RADICULOPATHY, LUMBAR REGION: ICD-10-CM

## 2024-11-21 DIAGNOSIS — M25.572 PAIN IN RIGHT ANKLE AND JOINTS OF RIGHT FOOT: ICD-10-CM

## 2024-11-21 DIAGNOSIS — M46.90 UNSPECIFIED INFLAMMATORY SPONDYLOPATHY, SITE UNSPECIFIED: ICD-10-CM

## 2024-11-21 PROCEDURE — G2211 COMPLEX E/M VISIT ADD ON: CPT | Mod: NC

## 2024-11-21 PROCEDURE — 99215 OFFICE O/P EST HI 40 MIN: CPT

## 2024-12-06 ENCOUNTER — TRANSCRIPTION ENCOUNTER (OUTPATIENT)
Age: 49
End: 2024-12-06

## 2024-12-09 ENCOUNTER — APPOINTMENT (OUTPATIENT)
Dept: OPHTHALMOLOGY | Facility: CLINIC | Age: 49
End: 2024-12-09
Payer: MEDICAID

## 2024-12-09 ENCOUNTER — NON-APPOINTMENT (OUTPATIENT)
Age: 49
End: 2024-12-09

## 2024-12-09 PROCEDURE — 92014 COMPRE OPH EXAM EST PT 1/>: CPT

## 2024-12-09 PROCEDURE — 92134 CPTRZ OPH DX IMG PST SGM RTA: CPT

## 2024-12-09 PROCEDURE — 92083 EXTENDED VISUAL FIELD XM: CPT

## 2024-12-12 ENCOUNTER — APPOINTMENT (OUTPATIENT)
Dept: PHYSICAL MEDICINE AND REHAB | Facility: CLINIC | Age: 49
End: 2024-12-12

## 2024-12-16 ENCOUNTER — APPOINTMENT (OUTPATIENT)
Dept: OPHTHALMOLOGY | Facility: CLINIC | Age: 49
End: 2024-12-16

## 2024-12-19 ENCOUNTER — APPOINTMENT (OUTPATIENT)
Dept: PHYSICAL MEDICINE AND REHAB | Facility: CLINIC | Age: 49
End: 2024-12-19
Payer: MEDICAID

## 2024-12-19 VITALS
HEIGHT: 62 IN | BODY MASS INDEX: 30.91 KG/M2 | HEART RATE: 102 BPM | WEIGHT: 168 LBS | SYSTOLIC BLOOD PRESSURE: 138 MMHG | RESPIRATION RATE: 14 BRPM | DIASTOLIC BLOOD PRESSURE: 88 MMHG

## 2024-12-19 DIAGNOSIS — I89.0 LYMPHEDEMA, NOT ELSEWHERE CLASSIFIED: ICD-10-CM

## 2024-12-19 DIAGNOSIS — M79.2 NEURALGIA AND NEURITIS, UNSPECIFIED: ICD-10-CM

## 2024-12-19 PROCEDURE — 99214 OFFICE O/P EST MOD 30 MIN: CPT

## 2025-01-08 ENCOUNTER — NON-APPOINTMENT (OUTPATIENT)
Age: 50
End: 2025-01-08

## 2025-01-15 ENCOUNTER — NON-APPOINTMENT (OUTPATIENT)
Age: 50
End: 2025-01-15

## 2025-01-29 ENCOUNTER — OUTPATIENT (OUTPATIENT)
Dept: OUTPATIENT SERVICES | Facility: HOSPITAL | Age: 50
LOS: 1 days | Discharge: HOME | End: 2025-01-29

## 2025-01-29 DIAGNOSIS — C50.919 MALIGNANT NEOPLASM OF UNSPECIFIED SITE OF UNSPECIFIED FEMALE BREAST: ICD-10-CM

## 2025-01-29 DIAGNOSIS — Z98.890 OTHER SPECIFIED POSTPROCEDURAL STATES: Chronic | ICD-10-CM

## 2025-01-29 PROBLEM — R92.30 DENSE BREASTS: Status: ACTIVE | Noted: 2019-02-14

## 2025-01-29 PROBLEM — Z85.3 HISTORY OF BREAST CANCER: Status: ACTIVE | Noted: 2025-01-29

## 2025-01-29 PROBLEM — Z85.3 HISTORY OF MALIGNANT NEOPLASM OF LEFT BREAST: Status: RESOLVED | Noted: 2023-07-10 | Resolved: 2025-01-29

## 2025-02-03 ENCOUNTER — APPOINTMENT (OUTPATIENT)
Dept: BREAST CENTER | Facility: CLINIC | Age: 50
End: 2025-02-03
Payer: MEDICAID

## 2025-02-03 VITALS
HEIGHT: 62 IN | BODY MASS INDEX: 31.47 KG/M2 | WEIGHT: 171 LBS | SYSTOLIC BLOOD PRESSURE: 145 MMHG | DIASTOLIC BLOOD PRESSURE: 93 MMHG | HEART RATE: 90 BPM | OXYGEN SATURATION: 96 % | TEMPERATURE: 97.1 F

## 2025-02-03 DIAGNOSIS — R92.30 DENSE BREASTS, UNSPECIFIED: ICD-10-CM

## 2025-02-03 DIAGNOSIS — N64.4 MASTODYNIA: ICD-10-CM

## 2025-02-03 DIAGNOSIS — Z85.3 PERSONAL HISTORY OF MALIGNANT NEOPLASM OF BREAST: ICD-10-CM

## 2025-02-03 DIAGNOSIS — Z80.3 FAMILY HISTORY OF MALIGNANT NEOPLASM OF BREAST: ICD-10-CM

## 2025-02-03 PROCEDURE — 99214 OFFICE O/P EST MOD 30 MIN: CPT

## 2025-02-06 ENCOUNTER — APPOINTMENT (OUTPATIENT)
Dept: HEMATOLOGY ONCOLOGY | Facility: CLINIC | Age: 50
End: 2025-02-06
Payer: MEDICAID

## 2025-02-06 ENCOUNTER — RESULT REVIEW (OUTPATIENT)
Age: 50
End: 2025-02-06

## 2025-02-06 ENCOUNTER — APPOINTMENT (OUTPATIENT)
Dept: CARDIOLOGY | Facility: CLINIC | Age: 50
End: 2025-02-06
Payer: MEDICAID

## 2025-02-06 ENCOUNTER — NON-APPOINTMENT (OUTPATIENT)
Age: 50
End: 2025-02-06

## 2025-02-06 VITALS
DIASTOLIC BLOOD PRESSURE: 86 MMHG | HEART RATE: 80 BPM | WEIGHT: 171 LBS | SYSTOLIC BLOOD PRESSURE: 138 MMHG | HEIGHT: 62 IN | BODY MASS INDEX: 31.47 KG/M2 | TEMPERATURE: 97.9 F | OXYGEN SATURATION: 95 %

## 2025-02-06 VITALS
WEIGHT: 171 LBS | HEART RATE: 101 BPM | RESPIRATION RATE: 16 BRPM | SYSTOLIC BLOOD PRESSURE: 112 MMHG | TEMPERATURE: 98.2 F | DIASTOLIC BLOOD PRESSURE: 76 MMHG | BODY MASS INDEX: 31.47 KG/M2 | HEIGHT: 62 IN | OXYGEN SATURATION: 100 %

## 2025-02-06 DIAGNOSIS — Z51.81 ENCOUNTER FOR THERAPEUTIC DRUG LVL MONITORING: ICD-10-CM

## 2025-02-06 DIAGNOSIS — C50.911 MALIGNANT NEOPLASM OF UNSPECIFIED SITE OF RIGHT FEMALE BREAST: ICD-10-CM

## 2025-02-06 DIAGNOSIS — Z79.810 ENCOUNTER FOR THERAPEUTIC DRUG LVL MONITORING: ICD-10-CM

## 2025-02-06 DIAGNOSIS — I10 ESSENTIAL (PRIMARY) HYPERTENSION: ICD-10-CM

## 2025-02-06 DIAGNOSIS — D05.12 INTRADUCTAL CARCINOMA IN SITU OF LEFT BREAST: ICD-10-CM

## 2025-02-06 DIAGNOSIS — Z17.0 MALIGNANT NEOPLASM OF UNSPECIFIED SITE OF RIGHT FEMALE BREAST: ICD-10-CM

## 2025-02-06 LAB
BASOPHILS # BLD AUTO: 0.1 K/UL — SIGNIFICANT CHANGE UP (ref 0–0.2)
BASOPHILS NFR BLD AUTO: 1 % — SIGNIFICANT CHANGE UP (ref 0–2)
EOSINOPHIL # BLD AUTO: 0.1 K/UL — SIGNIFICANT CHANGE UP (ref 0–0.5)
EOSINOPHIL NFR BLD AUTO: 1.7 % — SIGNIFICANT CHANGE UP (ref 0–6)
HCT VFR BLD CALC: 37.7 % — SIGNIFICANT CHANGE UP (ref 34.5–45)
HGB BLD-MCNC: 12 G/DL — SIGNIFICANT CHANGE UP (ref 11.5–15.5)
LYMPHOCYTES # BLD AUTO: 1.6 K/UL — SIGNIFICANT CHANGE UP (ref 1–3.3)
LYMPHOCYTES # BLD AUTO: 31.8 % — SIGNIFICANT CHANGE UP (ref 13–44)
MCHC RBC-ENTMCNC: 31.8 G/DL — LOW (ref 32–36)
MCHC RBC-ENTMCNC: 32.1 PG — SIGNIFICANT CHANGE UP (ref 27–34)
MCV RBC AUTO: 100.9 FL — HIGH (ref 80–100)
MONOCYTES # BLD AUTO: 0.3 K/UL — SIGNIFICANT CHANGE UP (ref 0–0.9)
MONOCYTES NFR BLD AUTO: 6.5 % — SIGNIFICANT CHANGE UP (ref 2–14)
NEUTROPHILS # BLD AUTO: 2.9 K/UL — SIGNIFICANT CHANGE UP (ref 1.8–7.4)
NEUTROPHILS NFR BLD AUTO: 59 % — SIGNIFICANT CHANGE UP (ref 43–77)
PLATELET # BLD AUTO: 228 K/UL — SIGNIFICANT CHANGE UP (ref 150–400)
RBC # BLD: 3.74 M/UL — LOW (ref 3.8–5.2)
RBC # FLD: 12 % — SIGNIFICANT CHANGE UP (ref 10.3–14.5)
WBC # BLD: 4.9 K/UL — SIGNIFICANT CHANGE UP (ref 3.8–10.5)
WBC # FLD AUTO: 4.9 K/UL — SIGNIFICANT CHANGE UP (ref 3.8–10.5)

## 2025-02-06 PROCEDURE — 93000 ELECTROCARDIOGRAM COMPLETE: CPT

## 2025-02-06 PROCEDURE — 99215 OFFICE O/P EST HI 40 MIN: CPT

## 2025-02-06 PROCEDURE — G2211 COMPLEX E/M VISIT ADD ON: CPT | Mod: NC

## 2025-02-06 PROCEDURE — 99214 OFFICE O/P EST MOD 30 MIN: CPT

## 2025-02-20 ENCOUNTER — APPOINTMENT (OUTPATIENT)
Dept: RHEUMATOLOGY | Facility: CLINIC | Age: 50
End: 2025-02-20
Payer: MEDICAID

## 2025-02-20 ENCOUNTER — APPOINTMENT (OUTPATIENT)
Dept: PHYSICAL MEDICINE AND REHAB | Facility: CLINIC | Age: 50
End: 2025-02-20
Payer: MEDICAID

## 2025-02-20 ENCOUNTER — NON-APPOINTMENT (OUTPATIENT)
Age: 50
End: 2025-02-20

## 2025-02-20 VITALS
DIASTOLIC BLOOD PRESSURE: 85 MMHG | SYSTOLIC BLOOD PRESSURE: 134 MMHG | WEIGHT: 171 LBS | HEART RATE: 103 BPM | BODY MASS INDEX: 31.47 KG/M2 | RESPIRATION RATE: 14 BRPM | HEIGHT: 62 IN

## 2025-02-20 VITALS
HEIGHT: 62 IN | OXYGEN SATURATION: 95 % | HEART RATE: 103 BPM | DIASTOLIC BLOOD PRESSURE: 70 MMHG | TEMPERATURE: 96.4 F | SYSTOLIC BLOOD PRESSURE: 138 MMHG

## 2025-02-20 DIAGNOSIS — Z91.89 OTHER SPECIFIED PERSONAL RISK FACTORS, NOT ELSEWHERE CLASSIFIED: ICD-10-CM

## 2025-02-20 DIAGNOSIS — M47.816 SPONDYLOSIS W/OUT MYELOPATHY OR RADICULOPATHY, LUMBAR REGION: ICD-10-CM

## 2025-02-20 DIAGNOSIS — M77.02 MEDIAL EPICONDYLITIS, RIGHT ELBOW: ICD-10-CM

## 2025-02-20 DIAGNOSIS — M25.511 PAIN IN RIGHT SHOULDER: ICD-10-CM

## 2025-02-20 DIAGNOSIS — M77.01 MEDIAL EPICONDYLITIS, RIGHT ELBOW: ICD-10-CM

## 2025-02-20 DIAGNOSIS — M25.561 PAIN IN RIGHT KNEE: ICD-10-CM

## 2025-02-20 DIAGNOSIS — Z79.60 LONG TERM (CURRENT) USE OF UNSPECIFIED IMMUNOMODULATORS AND IMMUNOSUPPRESSANTS: ICD-10-CM

## 2025-02-20 DIAGNOSIS — M25.571 PAIN IN RIGHT ANKLE AND JOINTS OF RIGHT FOOT: ICD-10-CM

## 2025-02-20 DIAGNOSIS — R25.2 CRAMP AND SPASM: ICD-10-CM

## 2025-02-20 DIAGNOSIS — M79.671 PAIN IN RIGHT FOOT: ICD-10-CM

## 2025-02-20 DIAGNOSIS — I89.0 LYMPHEDEMA, NOT ELSEWHERE CLASSIFIED: ICD-10-CM

## 2025-02-20 DIAGNOSIS — M25.572 PAIN IN RIGHT ANKLE AND JOINTS OF RIGHT FOOT: ICD-10-CM

## 2025-02-20 DIAGNOSIS — M19.90 UNSPECIFIED OSTEOARTHRITIS, UNSPECIFIED SITE: ICD-10-CM

## 2025-02-20 DIAGNOSIS — M79.2 NEURALGIA AND NEURITIS, UNSPECIFIED: ICD-10-CM

## 2025-02-20 DIAGNOSIS — M25.562 PAIN IN RIGHT KNEE: ICD-10-CM

## 2025-02-20 DIAGNOSIS — R52 PAIN, UNSPECIFIED: ICD-10-CM

## 2025-02-20 DIAGNOSIS — M46.90 UNSPECIFIED INFLAMMATORY SPONDYLOPATHY, SITE UNSPECIFIED: ICD-10-CM

## 2025-02-20 DIAGNOSIS — M25.512 PAIN IN LEFT SHOULDER: ICD-10-CM

## 2025-02-20 DIAGNOSIS — M15.0 PRIMARY GENERALIZED (OSTEO)ARTHRITIS: ICD-10-CM

## 2025-02-20 PROCEDURE — 99214 OFFICE O/P EST MOD 30 MIN: CPT

## 2025-02-20 PROCEDURE — G2211 COMPLEX E/M VISIT ADD ON: CPT | Mod: NC

## 2025-02-20 RX ORDER — CALCIUM CARBONATE 260MG(650)
100 TABLET,CHEWABLE ORAL
Refills: 0 | Status: ACTIVE | COMMUNITY

## 2025-04-22 ENCOUNTER — APPOINTMENT (OUTPATIENT)
Dept: PHYSICAL MEDICINE AND REHAB | Facility: CLINIC | Age: 50
End: 2025-04-22
Payer: MEDICAID

## 2025-04-22 VITALS
BODY MASS INDEX: 31.65 KG/M2 | HEIGHT: 62 IN | HEART RATE: 116 BPM | DIASTOLIC BLOOD PRESSURE: 90 MMHG | SYSTOLIC BLOOD PRESSURE: 140 MMHG | RESPIRATION RATE: 14 BRPM | WEIGHT: 172 LBS

## 2025-04-22 DIAGNOSIS — Z91.89 OTHER SPECIFIED PERSONAL RISK FACTORS, NOT ELSEWHERE CLASSIFIED: ICD-10-CM

## 2025-04-22 DIAGNOSIS — M79.2 NEURALGIA AND NEURITIS, UNSPECIFIED: ICD-10-CM

## 2025-04-22 DIAGNOSIS — G89.28 OTHER CHRONIC POSTPROCEDURAL PAIN: ICD-10-CM

## 2025-04-22 DIAGNOSIS — I89.0 LYMPHEDEMA, NOT ELSEWHERE CLASSIFIED: ICD-10-CM

## 2025-04-22 PROCEDURE — 99214 OFFICE O/P EST MOD 30 MIN: CPT

## 2025-05-22 ENCOUNTER — APPOINTMENT (OUTPATIENT)
Dept: RHEUMATOLOGY | Facility: CLINIC | Age: 50
End: 2025-05-22
Payer: MEDICAID

## 2025-05-22 VITALS
HEIGHT: 62 IN | BODY MASS INDEX: 31.65 KG/M2 | SYSTOLIC BLOOD PRESSURE: 122 MMHG | HEART RATE: 84 BPM | WEIGHT: 172 LBS | OXYGEN SATURATION: 98 % | DIASTOLIC BLOOD PRESSURE: 82 MMHG | TEMPERATURE: 97.3 F

## 2025-05-22 DIAGNOSIS — M79.672 PAIN IN RIGHT FOOT: ICD-10-CM

## 2025-05-22 DIAGNOSIS — R25.2 CRAMP AND SPASM: ICD-10-CM

## 2025-05-22 DIAGNOSIS — M19.90 UNSPECIFIED OSTEOARTHRITIS, UNSPECIFIED SITE: ICD-10-CM

## 2025-05-22 DIAGNOSIS — M25.571 PAIN IN RIGHT ANKLE AND JOINTS OF RIGHT FOOT: ICD-10-CM

## 2025-05-22 DIAGNOSIS — Z79.60 LONG TERM (CURRENT) USE OF UNSPECIFIED IMMUNOMODULATORS AND IMMUNOSUPPRESSANTS: ICD-10-CM

## 2025-05-22 DIAGNOSIS — R52 PAIN, UNSPECIFIED: ICD-10-CM

## 2025-05-22 DIAGNOSIS — M25.572 PAIN IN RIGHT ANKLE AND JOINTS OF RIGHT FOOT: ICD-10-CM

## 2025-05-22 DIAGNOSIS — M77.02 MEDIAL EPICONDYLITIS, RIGHT ELBOW: ICD-10-CM

## 2025-05-22 DIAGNOSIS — M15.0 PRIMARY GENERALIZED (OSTEO)ARTHRITIS: ICD-10-CM

## 2025-05-22 DIAGNOSIS — M46.90 UNSPECIFIED INFLAMMATORY SPONDYLOPATHY, SITE UNSPECIFIED: ICD-10-CM

## 2025-05-22 DIAGNOSIS — M77.01 MEDIAL EPICONDYLITIS, RIGHT ELBOW: ICD-10-CM

## 2025-05-22 DIAGNOSIS — M47.816 SPONDYLOSIS W/OUT MYELOPATHY OR RADICULOPATHY, LUMBAR REGION: ICD-10-CM

## 2025-05-22 DIAGNOSIS — M79.671 PAIN IN RIGHT FOOT: ICD-10-CM

## 2025-05-22 DIAGNOSIS — M25.512 PAIN IN LEFT SHOULDER: ICD-10-CM

## 2025-05-22 PROCEDURE — G2211 COMPLEX E/M VISIT ADD ON: CPT | Mod: NC

## 2025-05-22 PROCEDURE — 99215 OFFICE O/P EST HI 40 MIN: CPT

## 2025-06-24 ENCOUNTER — APPOINTMENT (OUTPATIENT)
Dept: PHYSICAL MEDICINE AND REHAB | Facility: CLINIC | Age: 50
End: 2025-06-24
Payer: MEDICAID

## 2025-06-24 PROCEDURE — 99214 OFFICE O/P EST MOD 30 MIN: CPT

## 2025-07-22 ENCOUNTER — APPOINTMENT (OUTPATIENT)
Dept: CARDIOLOGY | Facility: CLINIC | Age: 50
End: 2025-07-22
Payer: MEDICAID

## 2025-07-22 VITALS
SYSTOLIC BLOOD PRESSURE: 120 MMHG | OXYGEN SATURATION: 100 % | TEMPERATURE: 98.1 F | HEIGHT: 62 IN | DIASTOLIC BLOOD PRESSURE: 79 MMHG | HEART RATE: 76 BPM

## 2025-07-22 DIAGNOSIS — I10 ESSENTIAL (PRIMARY) HYPERTENSION: ICD-10-CM

## 2025-07-22 PROCEDURE — 99215 OFFICE O/P EST HI 40 MIN: CPT

## 2025-08-18 ENCOUNTER — APPOINTMENT (OUTPATIENT)
Dept: HEMATOLOGY ONCOLOGY | Facility: CLINIC | Age: 50
End: 2025-08-18

## 2025-08-21 ENCOUNTER — APPOINTMENT (OUTPATIENT)
Dept: RHEUMATOLOGY | Facility: CLINIC | Age: 50
End: 2025-08-21
Payer: MEDICAID

## 2025-08-21 VITALS
DIASTOLIC BLOOD PRESSURE: 76 MMHG | BODY MASS INDEX: 32.02 KG/M2 | WEIGHT: 174 LBS | SYSTOLIC BLOOD PRESSURE: 120 MMHG | HEIGHT: 62 IN | TEMPERATURE: 97.3 F | OXYGEN SATURATION: 98 % | RESPIRATION RATE: 17 BRPM | HEART RATE: 73 BPM

## 2025-08-21 DIAGNOSIS — R25.2 CRAMP AND SPASM: ICD-10-CM

## 2025-08-21 DIAGNOSIS — M25.571 PAIN IN RIGHT ANKLE AND JOINTS OF RIGHT FOOT: ICD-10-CM

## 2025-08-21 DIAGNOSIS — Z79.60 LONG TERM (CURRENT) USE OF UNSPECIFIED IMMUNOMODULATORS AND IMMUNOSUPPRESSANTS: ICD-10-CM

## 2025-08-21 DIAGNOSIS — M77.02 MEDIAL EPICONDYLITIS, RIGHT ELBOW: ICD-10-CM

## 2025-08-21 DIAGNOSIS — M19.90 UNSPECIFIED OSTEOARTHRITIS, UNSPECIFIED SITE: ICD-10-CM

## 2025-08-21 DIAGNOSIS — M46.90 UNSPECIFIED INFLAMMATORY SPONDYLOPATHY, SITE UNSPECIFIED: ICD-10-CM

## 2025-08-21 DIAGNOSIS — M25.572 PAIN IN RIGHT ANKLE AND JOINTS OF RIGHT FOOT: ICD-10-CM

## 2025-08-21 DIAGNOSIS — M25.512 PAIN IN LEFT SHOULDER: ICD-10-CM

## 2025-08-21 DIAGNOSIS — M79.671 PAIN IN RIGHT FOOT: ICD-10-CM

## 2025-08-21 DIAGNOSIS — R52 PAIN, UNSPECIFIED: ICD-10-CM

## 2025-08-21 DIAGNOSIS — M47.816 SPONDYLOSIS W/OUT MYELOPATHY OR RADICULOPATHY, LUMBAR REGION: ICD-10-CM

## 2025-08-21 DIAGNOSIS — M77.01 MEDIAL EPICONDYLITIS, RIGHT ELBOW: ICD-10-CM

## 2025-08-21 DIAGNOSIS — M79.672 PAIN IN RIGHT FOOT: ICD-10-CM

## 2025-08-21 DIAGNOSIS — M15.0 PRIMARY GENERALIZED (OSTEO)ARTHRITIS: ICD-10-CM

## 2025-08-21 PROCEDURE — 99214 OFFICE O/P EST MOD 30 MIN: CPT

## 2025-08-21 PROCEDURE — G2211 COMPLEX E/M VISIT ADD ON: CPT | Mod: NC

## 2025-08-26 ENCOUNTER — APPOINTMENT (OUTPATIENT)
Dept: PHYSICAL MEDICINE AND REHAB | Facility: CLINIC | Age: 50
End: 2025-08-26
Payer: MEDICAID

## 2025-08-26 VITALS
HEART RATE: 82 BPM | DIASTOLIC BLOOD PRESSURE: 75 MMHG | SYSTOLIC BLOOD PRESSURE: 131 MMHG | BODY MASS INDEX: 31.83 KG/M2 | HEIGHT: 62 IN | WEIGHT: 173 LBS | RESPIRATION RATE: 15 BRPM | OXYGEN SATURATION: 98 %

## 2025-08-26 DIAGNOSIS — G89.28 OTHER CHRONIC POSTPROCEDURAL PAIN: ICD-10-CM

## 2025-08-26 DIAGNOSIS — M79.2 NEURALGIA AND NEURITIS, UNSPECIFIED: ICD-10-CM

## 2025-08-26 DIAGNOSIS — I89.0 LYMPHEDEMA, NOT ELSEWHERE CLASSIFIED: ICD-10-CM

## 2025-08-26 PROCEDURE — 99214 OFFICE O/P EST MOD 30 MIN: CPT

## (undated) DEVICE — ENDOCUFF VISION SZ 3 SM PRPL

## (undated) DEVICE — CLAMP BX HOT RAD JAW 3

## (undated) DEVICE — RETRIEVER ROTH NET PLATINUM-UNIVERSAL

## (undated) DEVICE — SENS OXI DGT OXISENSOR II ADLN

## (undated) DEVICE — SUCTION YANKAUER TAPERED BULBOUS NO VENT

## (undated) DEVICE — TUBING SUCTION CONN 6FT STERILE

## (undated) DEVICE — TUBE RECTAL 24FR

## (undated) DEVICE — SYR SLIP LOK 30CC

## (undated) DEVICE — SET IV PUMP BLOOD 1VALVE 180FILTER NON-DEHP

## (undated) DEVICE — TUBING ENDO EXT OLYMPUS 160 24HR USE

## (undated) DEVICE — TUBING IV SET GRAVITY 3Y 100" MACRO

## (undated) DEVICE — SNARE LRG

## (undated) DEVICE — SYR SLIP TIP 60CC

## (undated) DEVICE — TUBING CANNULA SALTER LABS NASAL ADULT 7FT

## (undated) DEVICE — VALVE BIOPSY

## (undated) DEVICE — SOL IRR NS 0.9% 250ML

## (undated) DEVICE — VALVE ENDOSCOPE DEFENDO SINGLE USE

## (undated) DEVICE — ELCTR GROUNDING PAD ADULT COVIDIEN

## (undated) DEVICE — CATH ELECHMSTAT  INJ 7FR 210CM

## (undated) DEVICE — Device

## (undated) DEVICE — SOL IRR POUR H2O 500ML

## (undated) DEVICE — CATH IV SAFE BC BLU 22GAX1.0"

## (undated) DEVICE — GLV 8 PROTEXIS

## (undated) DEVICE — TRAP E POLY

## (undated) DEVICE — BRUSH COLONOSCOPY CYTOLOGY

## (undated) DEVICE — SUT HEWSON RETRIEVER

## (undated) DEVICE — MARKER ENDO SPOT EX

## (undated) DEVICE — CATH ELCTR GLIDE PRB 7FR

## (undated) DEVICE — FORMALIN CUPS 10% BUFFERED

## (undated) DEVICE — FORCEP RADIAL JAW 4 W NDL 2.4MM 2.8MM 240CM ORANGE DISP

## (undated) DEVICE — MASK OXYGEN PANORAMIC

## (undated) DEVICE — NDL INJ SCLERO INTERJECT 23G

## (undated) DEVICE — CANISTER SUCTION 1200CC 10/SL

## (undated) DEVICE — TRAP SPECIMEN SPUTUM 40CC

## (undated) DEVICE — SYR IV POSIFLUSH NS 3ML 30/TY

## (undated) DEVICE — TRAP QUICK CATCH  SINGL CHAMBER

## (undated) DEVICE — CATH IV SAFE BC PINK 20GA X 1.16"

## (undated) DEVICE — TUBE O2 SUPL CRUSH RESIS CONN SOUTHSIDE ONLY

## (undated) DEVICE — ENDOCUFF VISION SZ 2 LG GRN

## (undated) DEVICE — PACK IV START WITH CHG

## (undated) DEVICE — TUBING IV SET SECONDARY 34"

## (undated) DEVICE — SNARE POLYP SENS 27MM 240CM

## (undated) DEVICE — FORCEP RADIAL JAW 4 JUMBO 2.8MM 3.2MM 240CM ORANGE DISP

## (undated) DEVICE — MASK O2 NON REBREATH 3IN1 ADULT